# Patient Record
Sex: MALE | Race: WHITE | NOT HISPANIC OR LATINO | Employment: FULL TIME | URBAN - METROPOLITAN AREA
[De-identification: names, ages, dates, MRNs, and addresses within clinical notes are randomized per-mention and may not be internally consistent; named-entity substitution may affect disease eponyms.]

---

## 2017-01-19 ENCOUNTER — TELEPHONE (OUTPATIENT)
Dept: CARDIOLOGY | Facility: CLINIC | Age: 62
End: 2017-01-19

## 2017-01-19 NOTE — TELEPHONE ENCOUNTER
Patient said that he got a call to reschedule his appt.  Said this was moved to may 15th at 11;45  Would like later that day.    [phone]

## 2017-02-28 RX ORDER — LISINOPRIL 20 MG/1
TABLET ORAL
Qty: 30 TABLET | Refills: 5 | Status: SHIPPED | OUTPATIENT
Start: 2017-02-28 | End: 2017-08-27 | Stop reason: SDUPTHER

## 2017-04-21 RX ORDER — ISOSORBIDE MONONITRATE 30 MG/1
TABLET, EXTENDED RELEASE ORAL
Qty: 30 TABLET | Refills: 1 | Status: SHIPPED | OUTPATIENT
Start: 2017-04-21 | End: 2017-06-24 | Stop reason: SDUPTHER

## 2017-04-21 RX ORDER — ATORVASTATIN CALCIUM 40 MG/1
TABLET, FILM COATED ORAL
Qty: 30 TABLET | Refills: 1 | Status: SHIPPED | OUTPATIENT
Start: 2017-04-21 | End: 2017-06-24 | Stop reason: SDUPTHER

## 2017-04-21 RX ORDER — ATENOLOL 50 MG/1
TABLET ORAL
Qty: 45 TABLET | Refills: 1 | Status: SHIPPED | OUTPATIENT
Start: 2017-04-21 | End: 2017-06-24 | Stop reason: SDUPTHER

## 2017-05-18 ENCOUNTER — OFFICE VISIT (OUTPATIENT)
Dept: CARDIOLOGY | Facility: CLINIC | Age: 62
End: 2017-05-18

## 2017-05-18 VITALS
HEIGHT: 72 IN | BODY MASS INDEX: 29.66 KG/M2 | DIASTOLIC BLOOD PRESSURE: 80 MMHG | WEIGHT: 219 LBS | HEART RATE: 64 BPM | SYSTOLIC BLOOD PRESSURE: 128 MMHG

## 2017-05-18 DIAGNOSIS — I25.10 CORONARY ARTERY DISEASE INVOLVING NATIVE CORONARY ARTERY OF NATIVE HEART WITHOUT ANGINA PECTORIS: Primary | ICD-10-CM

## 2017-05-18 DIAGNOSIS — I25.82 CHRONIC TOTAL OCCLUSION OF CORONARY ARTERY: ICD-10-CM

## 2017-05-18 DIAGNOSIS — E78.2 MIXED HYPERLIPIDEMIA: ICD-10-CM

## 2017-05-18 PROBLEM — I10 ESSENTIAL HYPERTENSION: Status: ACTIVE | Noted: 2017-05-18

## 2017-05-18 PROCEDURE — 93000 ELECTROCARDIOGRAM COMPLETE: CPT | Performed by: INTERNAL MEDICINE

## 2017-05-18 PROCEDURE — 99214 OFFICE O/P EST MOD 30 MIN: CPT | Performed by: INTERNAL MEDICINE

## 2017-06-26 RX ORDER — ATENOLOL 50 MG/1
TABLET ORAL
Qty: 45 TABLET | Refills: 5 | Status: SHIPPED | OUTPATIENT
Start: 2017-06-26 | End: 2018-03-18 | Stop reason: SDUPTHER

## 2017-06-26 RX ORDER — ISOSORBIDE MONONITRATE 30 MG/1
TABLET, EXTENDED RELEASE ORAL
Qty: 30 TABLET | Refills: 5 | Status: SHIPPED | OUTPATIENT
Start: 2017-06-26 | End: 2018-03-10 | Stop reason: SDUPTHER

## 2017-06-26 RX ORDER — ATORVASTATIN CALCIUM 40 MG/1
TABLET, FILM COATED ORAL
Qty: 30 TABLET | Refills: 3 | Status: SHIPPED | OUTPATIENT
Start: 2017-06-26 | End: 2017-12-20 | Stop reason: SDUPTHER

## 2017-07-10 ENCOUNTER — LAB (OUTPATIENT)
Dept: LAB | Facility: HOSPITAL | Age: 62
End: 2017-07-10

## 2017-07-10 DIAGNOSIS — E78.2 MIXED HYPERLIPIDEMIA: Primary | ICD-10-CM

## 2017-07-10 DIAGNOSIS — E78.2 MIXED HYPERLIPIDEMIA: ICD-10-CM

## 2017-07-10 LAB
ALBUMIN SERPL-MCNC: 4.3 G/DL (ref 3.5–5.2)
ALBUMIN/GLOB SERPL: 1.2 G/DL
ALP SERPL-CCNC: 71 U/L (ref 39–117)
ALT SERPL W P-5'-P-CCNC: 18 U/L (ref 1–41)
ANION GAP SERPL CALCULATED.3IONS-SCNC: 16.4 MMOL/L
AST SERPL-CCNC: 16 U/L (ref 1–40)
BILIRUB SERPL-MCNC: 0.8 MG/DL (ref 0.1–1.2)
BUN BLD-MCNC: 23 MG/DL (ref 8–23)
BUN/CREAT SERPL: 22.1 (ref 7–25)
CALCIUM SPEC-SCNC: 9.5 MG/DL (ref 8.6–10.5)
CHLORIDE SERPL-SCNC: 103 MMOL/L (ref 98–107)
CHOLEST SERPL-MCNC: 158 MG/DL (ref 0–200)
CO2 SERPL-SCNC: 22.6 MMOL/L (ref 22–29)
CREAT BLD-MCNC: 1.04 MG/DL (ref 0.76–1.27)
GFR SERPL CREATININE-BSD FRML MDRD: 73 ML/MIN/1.73
GLOBULIN UR ELPH-MCNC: 3.6 GM/DL
GLUCOSE BLD-MCNC: 87 MG/DL (ref 65–99)
HDLC SERPL-MCNC: 44 MG/DL (ref 40–60)
LDLC SERPL CALC-MCNC: 96 MG/DL (ref 0–100)
LDLC/HDLC SERPL: 2.19 {RATIO}
POTASSIUM BLD-SCNC: 4.3 MMOL/L (ref 3.5–5.2)
PROT SERPL-MCNC: 7.9 G/DL (ref 6–8.5)
SODIUM BLD-SCNC: 142 MMOL/L (ref 136–145)
TRIGL SERPL-MCNC: 89 MG/DL (ref 0–150)
VLDLC SERPL-MCNC: 17.8 MG/DL (ref 5–40)

## 2017-07-10 PROCEDURE — 36415 COLL VENOUS BLD VENIPUNCTURE: CPT

## 2017-07-10 PROCEDURE — 80061 LIPID PANEL: CPT

## 2017-07-10 PROCEDURE — 80053 COMPREHEN METABOLIC PANEL: CPT

## 2017-07-24 ENCOUNTER — TELEPHONE (OUTPATIENT)
Dept: CARDIOLOGY | Facility: CLINIC | Age: 62
End: 2017-07-24

## 2017-07-24 NOTE — TELEPHONE ENCOUNTER
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 He will just need another letter if so. Please advise.  We can probably cut and paste last years. Also he will pick it up when ready.    Thanks,  Karli

## 2017-07-25 ENCOUNTER — DOCUMENTATION (OUTPATIENT)
Dept: CARDIOLOGY | Facility: CLINIC | Age: 62
End: 2017-07-25

## 2017-07-25 NOTE — PROGRESS NOTES
To Whom It May Concern,     I have reviewed Mr. Pabon's case and have clinically evaluated him.  He has a history of coronary artery disease with stable angina that is mild, CCS class I and unchanged. 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         Dr. Han Nicholas M.D.  [facility]

## 2017-08-28 RX ORDER — LISINOPRIL 20 MG/1
TABLET ORAL
Qty: 30 TABLET | Refills: 5 | Status: SHIPPED | OUTPATIENT
Start: 2017-08-28 | End: 2018-07-08 | Stop reason: SDUPTHER

## 2017-12-20 RX ORDER — ATORVASTATIN CALCIUM 40 MG/1
TABLET, FILM COATED ORAL
Qty: 30 TABLET | Refills: 0 | OUTPATIENT
Start: 2017-12-20

## 2017-12-20 RX ORDER — ATORVASTATIN CALCIUM 40 MG/1
TABLET, FILM COATED ORAL
Qty: 90 TABLET | Refills: 1 | Status: SHIPPED | OUTPATIENT
Start: 2017-12-20 | End: 2018-12-10 | Stop reason: SDUPTHER

## 2018-03-11 RX ORDER — ISOSORBIDE MONONITRATE 30 MG/1
TABLET, EXTENDED RELEASE ORAL
Qty: 90 TABLET | Refills: 0 | Status: SHIPPED | OUTPATIENT
Start: 2018-03-11 | End: 2018-05-24 | Stop reason: SDUPTHER

## 2018-03-19 RX ORDER — ATENOLOL 50 MG/1
TABLET ORAL
Qty: 135 TABLET | Refills: 0 | Status: SHIPPED | OUTPATIENT
Start: 2018-03-19 | End: 2018-05-24 | Stop reason: SDUPTHER

## 2018-05-25 RX ORDER — ISOSORBIDE MONONITRATE 30 MG/1
TABLET, EXTENDED RELEASE ORAL
Qty: 90 TABLET | Refills: 0 | Status: SHIPPED | OUTPATIENT
Start: 2018-05-25 | End: 2018-12-10 | Stop reason: SDUPTHER

## 2018-05-25 RX ORDER — ATENOLOL 50 MG/1
TABLET ORAL
Qty: 135 TABLET | Refills: 0 | Status: SHIPPED | OUTPATIENT
Start: 2018-05-25 | End: 2019-01-03 | Stop reason: SDUPTHER

## 2018-06-28 ENCOUNTER — OFFICE VISIT (OUTPATIENT)
Dept: CARDIOLOGY | Facility: CLINIC | Age: 63
End: 2018-06-28

## 2018-06-28 VITALS
SYSTOLIC BLOOD PRESSURE: 138 MMHG | DIASTOLIC BLOOD PRESSURE: 70 MMHG | WEIGHT: 229 LBS | HEIGHT: 72 IN | HEART RATE: 70 BPM | BODY MASS INDEX: 31.02 KG/M2

## 2018-06-28 DIAGNOSIS — I25.82 CHRONIC TOTAL OCCLUSION OF CORONARY ARTERY: ICD-10-CM

## 2018-06-28 DIAGNOSIS — I25.10 CORONARY ARTERY DISEASE INVOLVING NATIVE CORONARY ARTERY OF NATIVE HEART WITHOUT ANGINA PECTORIS: Primary | ICD-10-CM

## 2018-06-28 DIAGNOSIS — I10 ESSENTIAL HYPERTENSION: ICD-10-CM

## 2018-06-28 DIAGNOSIS — E78.2 MIXED HYPERLIPIDEMIA: ICD-10-CM

## 2018-06-28 PROCEDURE — 93000 ELECTROCARDIOGRAM COMPLETE: CPT | Performed by: INTERNAL MEDICINE

## 2018-06-28 PROCEDURE — 99214 OFFICE O/P EST MOD 30 MIN: CPT | Performed by: INTERNAL MEDICINE

## 2018-07-09 RX ORDER — LISINOPRIL 20 MG/1
TABLET ORAL
Qty: 30 TABLET | Refills: 5 | Status: SHIPPED | OUTPATIENT
Start: 2018-07-09 | End: 2019-02-15 | Stop reason: SDUPTHER

## 2018-07-17 ENCOUNTER — LAB (OUTPATIENT)
Dept: LAB | Facility: HOSPITAL | Age: 63
End: 2018-07-17

## 2018-07-17 DIAGNOSIS — I10 ESSENTIAL HYPERTENSION: ICD-10-CM

## 2018-07-17 DIAGNOSIS — I25.10 CORONARY ARTERY DISEASE INVOLVING NATIVE CORONARY ARTERY OF NATIVE HEART WITHOUT ANGINA PECTORIS: ICD-10-CM

## 2018-07-17 LAB
ALBUMIN SERPL-MCNC: 4.3 G/DL (ref 3.5–5.2)
ALBUMIN/GLOB SERPL: 1.4 G/DL
ALP SERPL-CCNC: 72 U/L (ref 39–117)
ALT SERPL W P-5'-P-CCNC: 17 U/L (ref 1–41)
ANION GAP SERPL CALCULATED.3IONS-SCNC: 13.8 MMOL/L
AST SERPL-CCNC: 15 U/L (ref 1–40)
BILIRUB SERPL-MCNC: 0.6 MG/DL (ref 0.1–1.2)
BUN BLD-MCNC: 16 MG/DL (ref 8–23)
BUN/CREAT SERPL: 16 (ref 7–25)
CALCIUM SPEC-SCNC: 9.5 MG/DL (ref 8.6–10.5)
CHLORIDE SERPL-SCNC: 103 MMOL/L (ref 98–107)
CHOLEST SERPL-MCNC: 140 MG/DL (ref 0–200)
CO2 SERPL-SCNC: 24.2 MMOL/L (ref 22–29)
CREAT BLD-MCNC: 1 MG/DL (ref 0.76–1.27)
GFR SERPL CREATININE-BSD FRML MDRD: 76 ML/MIN/1.73
GLOBULIN UR ELPH-MCNC: 3.1 GM/DL
GLUCOSE BLD-MCNC: 94 MG/DL (ref 65–99)
HDLC SERPL-MCNC: 40 MG/DL (ref 40–60)
LDLC SERPL CALC-MCNC: 81 MG/DL (ref 0–100)
LDLC/HDLC SERPL: 2.03 {RATIO}
POTASSIUM BLD-SCNC: 4.1 MMOL/L (ref 3.5–5.2)
PROT SERPL-MCNC: 7.4 G/DL (ref 6–8.5)
SODIUM BLD-SCNC: 141 MMOL/L (ref 136–145)
TRIGL SERPL-MCNC: 95 MG/DL (ref 0–150)
VLDLC SERPL-MCNC: 19 MG/DL (ref 5–40)

## 2018-07-17 PROCEDURE — 80053 COMPREHEN METABOLIC PANEL: CPT | Performed by: INTERNAL MEDICINE

## 2018-07-17 PROCEDURE — 36415 COLL VENOUS BLD VENIPUNCTURE: CPT | Performed by: INTERNAL MEDICINE

## 2018-07-17 PROCEDURE — 80061 LIPID PANEL: CPT

## 2018-07-18 ENCOUNTER — TELEPHONE (OUTPATIENT)
Dept: CARDIOLOGY | Facility: CLINIC | Age: 63
End: 2018-07-18

## 2018-08-16 ENCOUNTER — TELEPHONE (OUTPATIENT)
Dept: CARDIOLOGY | Facility: CLINIC | Age: 63
End: 2018-08-16

## 2018-08-16 NOTE — TELEPHONE ENCOUNTER
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

## 2018-08-21 DIAGNOSIS — I25.10 CORONARY ARTERY DISEASE INVOLVING NATIVE CORONARY ARTERY OF NATIVE HEART WITHOUT ANGINA PECTORIS: Primary | ICD-10-CM

## 2018-09-04 ENCOUNTER — TELEPHONE (OUTPATIENT)
Dept: CARDIOLOGY | Facility: CLINIC | Age: 63
End: 2018-09-04

## 2018-09-04 ENCOUNTER — HOSPITAL ENCOUNTER (OUTPATIENT)
Dept: CARDIOLOGY | Facility: HOSPITAL | Age: 63
Discharge: HOME | End: 2018-09-04
Attending: INTERNAL MEDICINE | Admitting: INTERNAL MEDICINE

## 2018-09-04 DIAGNOSIS — I25.10 CORONARY ARTERY DISEASE INVOLVING NATIVE CORONARY ARTERY OF NATIVE HEART WITHOUT ANGINA PECTORIS: ICD-10-CM

## 2018-09-04 LAB
BH CV STRESS BP STAGE 1: NORMAL
BH CV STRESS BP STAGE 2: NORMAL
BH CV STRESS DURATION MIN STAGE 1: 3
BH CV STRESS DURATION MIN STAGE 2: 3
BH CV STRESS DURATION SEC STAGE 1: 0
BH CV STRESS DURATION SEC STAGE 2: 0
BH CV STRESS GRADE STAGE 1: 10
BH CV STRESS GRADE STAGE 2: 12
BH CV STRESS HR STAGE 1: 111
BH CV STRESS HR STAGE 2: 158
BH CV STRESS METS STAGE 1: 5
BH CV STRESS METS STAGE 2: 7.5
BH CV STRESS PROTOCOL 1: NORMAL
BH CV STRESS RECOVERY BP: NORMAL MMHG
BH CV STRESS RECOVERY HR: 91 BPM
BH CV STRESS SPEED STAGE 1: 1.7
BH CV STRESS SPEED STAGE 2: 2.5
BH CV STRESS STAGE 1: 1
BH CV STRESS STAGE 2: 2
MAXIMAL PREDICTED HEART RATE: 159 BPM
PERCENT MAX PREDICTED HR: 99.37 %
STRESS BASELINE BP: NORMAL MMHG
STRESS BASELINE HR: 73 BPM
STRESS PERCENT HR: 117 %
STRESS POST ESTIMATED WORKLOAD: 7 METS
STRESS POST EXERCISE DUR MIN: 6 MIN
STRESS POST EXERCISE DUR SEC: 0 SEC
STRESS POST PEAK BP: NORMAL MMHG
STRESS POST PEAK HR: 158 BPM
STRESS TARGET HR: 135 BPM

## 2018-09-04 PROCEDURE — 93016 CV STRESS TEST SUPVJ ONLY: CPT | Performed by: INTERNAL MEDICINE

## 2018-09-04 PROCEDURE — 93017 CV STRESS TEST TRACING ONLY: CPT

## 2018-09-04 PROCEDURE — 93018 CV STRESS TEST I&R ONLY: CPT | Performed by: INTERNAL MEDICINE

## 2018-09-04 NOTE — TELEPHONE ENCOUNTER
Pt called back about his stress test from today. He would like to know if he is clear from a DOT standpoint. If so He would like to know if we can get a letter to him ASAP, as he is planning on moving a tractor on Friday.  Please advise.    Thanks,  Karli

## 2018-09-06 ENCOUNTER — DOCUMENTATION (OUTPATIENT)
Dept: CARDIOLOGY | Facility: CLINIC | Age: 63
End: 2018-09-06

## 2018-09-06 NOTE — PROGRESS NOTES
To Whom It May Concern,    I had the pleasure of following Meng Pabon in our cardiology clinic.  He has a known chronic total occlusion of the mid LAD and mild coronary disease elsewhere.  He has no angina or dyspnea on exertion.  He is able to tolerate a treadmill stress test with no evidence of ischemia.  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       Sincerely,    Han Nicholas M.D.

## 2018-12-11 RX ORDER — ATORVASTATIN CALCIUM 40 MG/1
TABLET, FILM COATED ORAL
Qty: 90 TABLET | Refills: 1 | Status: SHIPPED | OUTPATIENT
Start: 2018-12-11 | End: 2019-09-10 | Stop reason: SDUPTHER

## 2018-12-11 RX ORDER — ISOSORBIDE MONONITRATE 30 MG/1
TABLET, EXTENDED RELEASE ORAL
Qty: 90 TABLET | Refills: 1 | Status: SHIPPED | OUTPATIENT
Start: 2018-12-11 | End: 2020-01-21

## 2019-01-03 RX ORDER — ATENOLOL 50 MG/1
TABLET ORAL
Qty: 135 TABLET | Refills: 2 | Status: SHIPPED | OUTPATIENT
Start: 2019-01-03 | End: 2020-01-21

## 2019-02-18 RX ORDER — LISINOPRIL 20 MG/1
TABLET ORAL
Qty: 30 TABLET | Refills: 5 | Status: SHIPPED | OUTPATIENT
Start: 2019-02-18 | End: 2020-04-14

## 2019-07-05 ENCOUNTER — HOSPITAL ENCOUNTER (OUTPATIENT)
Dept: GENERAL RADIOLOGY | Facility: HOSPITAL | Age: 64
Discharge: HOME | End: 2019-07-05
Admitting: NURSE PRACTITIONER

## 2019-07-05 ENCOUNTER — HOSPITAL ENCOUNTER (OUTPATIENT)
Dept: GENERAL RADIOLOGY | Facility: HOSPITAL | Age: 64
Discharge: HOME | End: 2019-07-05

## 2019-07-05 ENCOUNTER — OFFICE VISIT (OUTPATIENT)
Dept: INTERNAL MEDICINE | Age: 64
End: 2019-07-05

## 2019-07-05 VITALS
WEIGHT: 232.2 LBS | SYSTOLIC BLOOD PRESSURE: 132 MMHG | TEMPERATURE: 97.5 F | HEIGHT: 72 IN | DIASTOLIC BLOOD PRESSURE: 80 MMHG | HEART RATE: 64 BPM | BODY MASS INDEX: 31.45 KG/M2 | OXYGEN SATURATION: 98 %

## 2019-07-05 DIAGNOSIS — K21.9 GASTROESOPHAGEAL REFLUX DISEASE WITHOUT ESOPHAGITIS: ICD-10-CM

## 2019-07-05 DIAGNOSIS — R07.81 PAIN IN RIB: ICD-10-CM

## 2019-07-05 DIAGNOSIS — Z76.89 ENCOUNTER TO ESTABLISH CARE: Primary | ICD-10-CM

## 2019-07-05 PROCEDURE — 71046 X-RAY EXAM CHEST 2 VIEWS: CPT

## 2019-07-05 PROCEDURE — 72072 X-RAY EXAM THORAC SPINE 3VWS: CPT

## 2019-07-05 PROCEDURE — 99203 OFFICE O/P NEW LOW 30 MIN: CPT | Performed by: NURSE PRACTITIONER

## 2019-07-05 NOTE — PROGRESS NOTES
"Meng Pabon / 64 y.o. / male  Encounter Date: 07/05/2019    ASSESSMENT & PLAN:    Problem List Items Addressed This Visit     None      Visit Diagnoses     Encounter to establish care    -  Primary    Pain in rib        Relevant Orders    XR Chest PA & Lateral    XR spine thoracic 3 vw    Gastroesophageal reflux disease without esophagitis            Orders Placed This Encounter   Procedures   • XR Chest PA & Lateral   • XR spine thoracic 3 vw     No orders of the defined types were placed in this encounter.      Summary/Discussion:  1. Xray of thoracic spine and chest to rule out fracture or underlying disease causing rib pain daily x 4 months.   2. Handout provided for acid reflux lifestyle modifications, and recommend OTC Zantac or Pepcid for recurrent symptoms.   3. Patient declined treatment for rib pain, he is managing with Aleve PRN (once daily maybe).   4. Return in approx 3-6 months for rib pain, GERD.     Return in about 6 months (around 1/5/2020), or if symptoms worsen or fail to improve, for Check Up on rib pain, GERD.  ________________________________________________________________    VITALS:    Visit Vitals  /80   Pulse 64   Temp 97.5 °F (36.4 °C)   Ht 182.9 cm (72\")   Wt 105 kg (232 lb 3.2 oz)   SpO2 98%   BMI 31.49 kg/m²       BP Readings from Last 3 Encounters:   07/05/19 132/80   06/28/18 138/70   05/18/17 128/80     Wt Readings from Last 3 Encounters:   07/05/19 105 kg (232 lb 3.2 oz)   06/28/18 104 kg (229 lb)   05/18/17 99.3 kg (219 lb)      Body mass index is 31.49 kg/m².    CC: Main reason(s) for today's visit: Establish Care (pt c/o L sided rib pain, sometimes stabbing pain in R ribs, x 4 months) and Rib Pain    Patient is a 64 y.o. male who is here to establish care with new PCP and for preventive medicine service visit.  He is a new patient to me.      Concerns today include: Pain in L rib x 4-5 months, location: L posterior/thoracic spine involvement, radiates to side and around " to front. Improves with position or mobility, worse with laying down or sitting prolonged time. No trauma or injury to the area known. He takes Aleve as needed for occasional pain. Also complains of R side anterior rib pain that is intermittent, stabbing, approx once per week. No know triggers or relief for R side rib pain. Patient has never been a smoker.  Denies SOA, pain with deep breathing, chest pain, palpitations, headaches, vision changes.     Acid Reflux: onset >2 years ago, patient stopped drinking sodas and decreased spicy/fried foods, moderate improvement in symptoms. Never had EGD or diagnosis of reflux previously. Sx worse in daytime after known triggers (carbonated beverages, fried foods)  Heartburn   He complains of belching, heartburn, nausea and a sore throat. He reports no chest pain, no choking, no coughing or no wheezing. This is a recurrent problem. The current episode started more than 1 year ago. The problem occurs occasionally. The problem has been waxing and waning. The heartburn duration is several minutes. The heartburn is located in the substernum. The heartburn is of moderate intensity. The heartburn does not wake him from sleep. The heartburn does not limit his activity. The heartburn doesn't change with position. The symptoms are aggravated by certain foods. Pertinent negatives include no fatigue, orthopnea or weight loss. Risk factors include ETOH use and obesity. He has tried an antacid and a diet change (improved with decrease soda and fried foods intake) for the symptoms. The treatment provided moderate relief. Past procedures do not include an EGD.     Patient Care Team:  Agustina Cordero APRN as PCP - General (Nurse Practitioner)  Provider, No Known as PCP - Family Medicine  ____________________________________________________________________    REVIEW OF SYSTEMS    Review of Systems   Constitutional: Negative for activity change, appetite change, fatigue, unexpected weight change  and weight loss.   HENT: Positive for sore throat.    Eyes: Negative.    Respiratory: Negative.  Negative for cough, choking, shortness of breath and wheezing.    Cardiovascular: Negative for chest pain.   Gastrointestinal: Positive for heartburn and nausea.   Genitourinary: Negative.    Musculoskeletal: Positive for back pain (thoracic, L rib). Negative for arthralgias and myalgias.   Neurological: Negative.  Negative for headaches.   Psychiatric/Behavioral: Negative.      PHYSICAL EXAMINATION    Physical Exam   Constitutional: He is oriented to person, place, and time. He appears well-developed. No distress.   Neck: Normal range of motion. Neck supple.   Cardiovascular: Normal rate, regular rhythm and normal heart sounds.   Pulmonary/Chest: Effort normal and breath sounds normal. He has no wheezes.   Musculoskeletal: Normal range of motion. He exhibits no edema or tenderness.   Lymphadenopathy:     He has no cervical adenopathy.   Neurological: He is alert and oriented to person, place, and time.   Skin: Skin is warm and dry. No rash noted. No erythema.   Psychiatric: He has a normal mood and affect.   Vitals reviewed.    REVIEWED DATA:    Labs:   Lab Results   Component Value Date     07/17/2018    K 4.1 07/17/2018    AST 15 07/17/2018    ALT 17 07/17/2018    BUN 16 07/17/2018    CREATININE 1.00 07/17/2018    CREATININE 1.04 07/10/2017    CREATININE 1.21 09/14/2016    EGFRIFNONA 76 07/17/2018    EGFRIFAFRI  09/14/2016      Comment:      <15 Indicative of kidney failure.       Lab Results   Component Value Date    GLUCOSE 94 07/17/2018    GLUCOSE 87 07/10/2017    GLUCOSE 100 (H) 09/14/2016       Lab Results   Component Value Date    LDL 81 07/17/2018    LDL 96 07/10/2017     09/14/2016    HDL 40 07/17/2018    TRIG 95 07/17/2018       No results found for: TSH, FREET4       Lab Results   Component Value Date    WBC 8.24 08/14/2015    HGB 15.0 08/14/2015     08/14/2015         Imaging:       Medical Tests:      Summary of old records / correspondence / consultant report:      Request outside records:   ______________________________________________________________________    ALLERGIES  No Known Allergies     MEDICATIONS  Current Outpatient Medications on File Prior to Visit   Medication Sig   • aspirin 325 MG tablet Take 1 tablet by mouth daily.   • atenolol (TENORMIN) 50 MG tablet TAKE 1 AND 1/2 TABLETS BY MOUTH DAILY   • atorvastatin (LIPITOR) 40 MG tablet TAKE 1 TABLET BY MOUTH DAILY   • isosorbide mononitrate (IMDUR) 30 MG 24 hr tablet TAKE 1 TABLET BY MOUTH DAILY   • lisinopril (PRINIVIL,ZESTRIL) 20 MG tablet TAKE 1 TABLET BY MOUTH DAILY     No current facility-administered medications on file prior to visit.        PFSH:     The following portions of the patient's history were reviewed and updated as appropriate: Allergies / Current Medications / Past Medical History / Surgical History / Social History / Family History    PROBLEM LIST   Patient Active Problem List   Diagnosis   • Coronary artery disease involving native coronary artery of native heart without angina pectoris   • Chronic total occlusion of coronary artery   • Mixed hyperlipidemia   • Essential hypertension       PAST MEDICAL HISTORY  Past Medical History:   Diagnosis Date   • Atypical chest pain    • Basal cell carcinoma    • CAD (coronary artery disease)    • Chronic total occlusion of coronary artery    • GERD (gastroesophageal reflux disease)    • Hyperlipidemia    • Hypertension        SURGICAL HISTORY  Past Surgical History:   Procedure Laterality Date   • CARDIAC CATHETERIZATION     • INNER EAR SURGERY         SOCIAL HISTORY  Social History     Socioeconomic History   • Marital status:      Spouse name: Not on file   • Number of children: Not on file   • Years of education: Not on file   • Highest education level: Not on file   Tobacco Use   • Smoking status: Never Smoker   • Smokeless tobacco: Never Used    Substance and Sexual Activity   • Alcohol use: Yes     Comment: occasional, more in summer   • Sexual activity: Yes     Partners: Female     Birth control/protection: Post-menopausal       FAMILY HISTORY  Family History   Problem Relation Age of Onset   • Hypertension Father    • Skin cancer Father    • Heart disease Father    • Heart disease Sister    • No Known Problems Mother

## 2019-07-05 NOTE — PATIENT INSTRUCTIONS
Food Choices for Gastroesophageal Reflux Disease, Adult  When you have gastroesophageal reflux disease (GERD), the foods you eat and your eating habits are very important. Choosing the right foods can help ease your discomfort. Think about working with a nutrition specialist (dietitian) to help you make good choices.  What are tips for following this plan?  Meals  · Choose healthy foods that are low in fat, such as fruits, vegetables, whole grains, low-fat dairy products, and lean meat, fish, and poultry.  · Eat small meals often instead of 3 large meals a day. Eat your meals slowly, and in a place where you are relaxed. Avoid bending over or lying down until 2-3 hours after eating.  · Avoid eating meals 2-3 hours before bed.  · Avoid drinking a lot of liquid with meals.  · Cook foods using methods other than frying. Bake, grill, or broil food instead.  · Avoid or limit:  ? Chocolate.  ? Peppermint or spearmint.  ? Alcohol.  ? Pepper.  ? Black and decaffeinated coffee.  ? Black and decaffeinated tea.  ? Bubbly (carbonated) soft drinks.  ? Caffeinated energy drinks and soft drinks.  · Limit high-fat foods such as:  ? Fatty meat or fried foods.  ? Whole milk, cream, butter, or ice cream.  ? Nuts and nut butters.  ? Pastries, donuts, and sweets made with butter or shortening.  · Avoid foods that cause symptoms. These foods may be different for everyone. Common foods that cause symptoms include:  ? Tomatoes.  ? Oranges, trang, and limes.  ? Peppers.  ? Spicy food.  ? Onions and garlic.  ? Vinegar.  Lifestyle    · Maintain a healthy weight. Ask your doctor what weight is healthy for you. If you need to lose weight, work with your doctor to do so safely.  · Exercise for at least 30 minutes for 5 or more days each week, or as told by your doctor.  · Wear loose-fitting clothes.  · Do not smoke. If you need help quitting, ask your doctor.  · Sleep with the head of your bed higher than your feet. Use a wedge under the  mattress or blocks under the bed frame to raise the head of the bed.  Summary  · When you have gastroesophageal reflux disease (GERD), food and lifestyle choices are very important in easing your symptoms.  · Eat small meals often instead of 3 large meals a day. Eat your meals slowly, and in a place where you are relaxed.  · Limit high-fat foods such as fatty meat or fried foods.  · Avoid bending over or lying down until 2-3 hours after eating.  · Avoid peppermint and spearmint, caffeine, alcohol, and chocolate.  This information is not intended to replace advice given to you by your health care provider. Make sure you discuss any questions you have with your health care provider.  Document Released: 06/18/2013 Document Revised: 01/23/2018 Document Reviewed: 01/23/2018  [employer] Interactive Patient Education © 2019 [employer]

## 2019-07-26 ENCOUNTER — TELEPHONE (OUTPATIENT)
Dept: CARDIOLOGY | Facility: CLINIC | Age: 64
End: 2019-07-26

## 2019-07-26 ENCOUNTER — DOCUMENTATION (OUTPATIENT)
Dept: CARDIOLOGY | Facility: CLINIC | Age: 64
End: 2019-07-26

## 2019-08-28 ENCOUNTER — OFFICE VISIT (OUTPATIENT)
Dept: CARDIOLOGY | Facility: CLINIC | Age: 64
End: 2019-08-28

## 2019-08-28 VITALS
SYSTOLIC BLOOD PRESSURE: 138 MMHG | DIASTOLIC BLOOD PRESSURE: 70 MMHG | HEART RATE: 64 BPM | BODY MASS INDEX: 31.18 KG/M2 | HEIGHT: 72 IN | WEIGHT: 230.2 LBS

## 2019-08-28 DIAGNOSIS — E78.2 MIXED HYPERLIPIDEMIA: ICD-10-CM

## 2019-08-28 DIAGNOSIS — I10 ESSENTIAL HYPERTENSION: ICD-10-CM

## 2019-08-28 DIAGNOSIS — I25.10 CORONARY ARTERY DISEASE INVOLVING NATIVE CORONARY ARTERY OF NATIVE HEART WITHOUT ANGINA PECTORIS: Primary | ICD-10-CM

## 2019-08-28 PROCEDURE — 93000 ELECTROCARDIOGRAM COMPLETE: CPT | Performed by: NURSE PRACTITIONER

## 2019-08-28 PROCEDURE — 99214 OFFICE O/P EST MOD 30 MIN: CPT | Performed by: NURSE PRACTITIONER

## 2019-08-28 NOTE — PROGRESS NOTES
Date of Office Visit: 2019  Encounter Provider: SARIKA Cm  Place of Service: [facility] CARDIOLOGY  Patient Name: Meng Pabon  :1955      Chief Complaint   Patient presents with   • Coronary Artery Disease   • Hypertension   • Follow-up   :     Dear SARIKA Mercado,     HPI: Meng Pabon is a pleasant 64 y.o. male who presents today for cardiac follow up. He is a new patient to me and his previous records have been reviewed.     He has a known history of GERD, hypertension, and hyperlipidemia.  He has a known history of coronary artery disease with total occlusion of the mid LAD with right to left collaterals.  He has been treated with isosorbide.  He has had a history of chronic dyspnea with exertion and inclines.  He followed up with Dr. Pradip Nicholas in 2018 and was doing well at that time.    He presents today for his annual cardiac follow-up.  He said overall he is feeling much better.  His dizziness and shortness of breath have resolved.  He only now experiences dyspnea when climbing one hill in his neighborhood.  He occasionally experiences some indigestion after meals, but denies any exertional chest pressure or pain.  He further denies PND, orthopnea, cough, edema, palpitations, dizziness, syncope, or bleeding.    Past Medical History:   Diagnosis Date   • Atypical chest pain    • Basal cell carcinoma    • CAD (coronary artery disease)    • Chronic total occlusion of coronary artery    • GERD (gastroesophageal reflux disease)    • Hyperlipidemia    • Hypertension        Past Surgical History:   Procedure Laterality Date   • CARDIAC CATHETERIZATION     • INNER EAR SURGERY         Social History     Socioeconomic History   • Marital status:      Spouse name: Not on file   • Number of children: Not on file   • Years of education: Not on file   • Highest education level: Not on file   Tobacco Use   • Smoking status: Never Smoker   •  Smokeless tobacco: Never Used   Substance and Sexual Activity   • Alcohol use: Yes     Comment: Caffeine use   • Sexual activity: Yes     Partners: Female     Birth control/protection: Post-menopausal       Family History   Problem Relation Age of Onset   • Hypertension Father    • Skin cancer Father    • Heart disease Father    • Heart disease Sister    • No Known Problems Mother        The following portion of the patient's history were reviewed and updated as appropriate: past medical history, past surgical history, past social history, past family history, allergies, current medications, and problem list.    Review of Systems   Constitution: Positive for weight loss. Negative for chills, diaphoresis, fever, malaise/fatigue, night sweats and weight gain.   HENT: Positive for hearing loss. Negative for nosebleeds, sore throat and tinnitus.    Eyes: Negative for blurred vision, double vision, pain and visual disturbance.   Cardiovascular: Negative for chest pain, claudication, cyanosis, dyspnea on exertion, irregular heartbeat, leg swelling, near-syncope, orthopnea, palpitations, paroxysmal nocturnal dyspnea and syncope.   Respiratory: Negative for cough, hemoptysis, shortness of breath, snoring and wheezing.    Endocrine: Negative for cold intolerance, heat intolerance and polyuria.   Hematologic/Lymphatic: Negative for bleeding problem. Does not bruise/bleed easily.   Skin: Negative for color change, dry skin, flushing and itching.   Musculoskeletal: Negative for falls, joint pain, joint swelling, muscle cramps, muscle weakness and myalgias.   Gastrointestinal: Negative for abdominal pain, constipation, heartburn, melena, nausea and vomiting.   Genitourinary: Negative for dysuria and hematuria.        Erectile dysfunction   Neurological: Negative for excessive daytime sleepiness, dizziness, light-headedness, loss of balance, numbness, paresthesias, seizures and vertigo.   Psychiatric/Behavioral: Negative for  "altered mental status, depression, memory loss and substance abuse. The patient does not have insomnia and is not nervous/anxious.    Allergic/Immunologic: Negative for environmental allergies.       No Known Allergies      Current Outpatient Medications:   •  aspirin 325 MG tablet, Take 1 tablet by mouth daily., Disp: , Rfl:   •  atenolol (TENORMIN) 50 MG tablet, TAKE 1 AND 1/2 TABLETS BY MOUTH DAILY, Disp: 135 tablet, Rfl: 2  •  atorvastatin (LIPITOR) 40 MG tablet, TAKE 1 TABLET BY MOUTH DAILY, Disp: 90 tablet, Rfl: 1  •  isosorbide mononitrate (IMDUR) 30 MG 24 hr tablet, TAKE 1 TABLET BY MOUTH DAILY, Disp: 90 tablet, Rfl: 1  •  lisinopril (PRINIVIL,ZESTRIL) 20 MG tablet, TAKE 1 TABLET BY MOUTH DAILY, Disp: 30 tablet, Rfl: 5        Objective:     Vitals:    08/28/19 1423   BP: 138/70   BP Location: Left arm   Pulse: 64   Weight: 104 kg (230 lb 3.2 oz)   Height: 182.9 cm (72\")     Body mass index is 31.22 kg/m².    PHYSICAL EXAM:    Vitals Reviewed.   General Appearance: No acute distress, well developed and well nourished. Obese.   Eyes: Conjunctiva and lids: No erythema, swelling, or discharge. Sclera non-icteric.   HENT: Atraumatic, normocephalic. External eyes, ears, and nose normal. No hearing loss noted. Mucous membranes normal. Lips not cyanotic. Neck supple with no tenderness.  Respiratory: No signs of respiratory distress. Respiration rhythm and depth normal.   Clear to auscultation. No rales, crackles, rhonchi, or wheezing auscultated.   Cardiovascular:  Jugular Venous Pressure: Normal  Heart Rate and Rhythm: Normal, Heart Sounds: Normal S1 and S2. No S3 or S4 noted.  Murmurs: No murmurs noted. No rubs, thrills, or gallops.   Lower Extremities: No edema noted.  Gastrointestinal:  Abdomen soft, non-distended, non-tender. Normal bowel sounds. No hepatomegaly.   Musculoskeletal: Normal movement of extremities  Skin and Nails: General appearance normal. No pallor, cyanosis, diaphoresis. Skin temperature " normal. No clubbing of fingernails.   Psychiatric: Patient alert and oriented to person, place, and time. Speech and behavior appropriate. Normal mood and affect.       ECG 12 Lead  Date/Time: 8/28/2019 2:20 PM  Performed by: Reyna Lee APRN  Authorized by: Reyna Lee APRN   Comparison: compared with previous ECG from 6/28/2018  Similar to previous ECG  Rhythm: sinus rhythm  Rate: normal  BPM: 64  Conduction: conduction normal  ST Segments: ST segments normal  T Waves: T waves normal  QRS axis: normal    Clinical impression: normal ECG              Assessment:       Diagnosis Plan   1. Coronary artery disease involving native coronary artery of native heart without angina pectoris     2. Essential hypertension     3. Mixed hyperlipidemia            Plan:       1.  Coronary Artery Disease: Documented chronic total occlusion of the mid LAD with class II anginal symptoms in the past.  I think overall he is doing very well on his current medication regimen.  If he has any symptoms of concern, of asked him to call the office.  I have ordered a CBC, CMP, and lipid panel for his annual blood work.    Coronary Artery Disease  Assessment  • The patient has CCS class II - angina only during vigorous physical activity  • The patient has stable angina     Plan  • Lifestyle modifications discussed include adhering to a heart healthy diet, maintenance of a healthy weight, medication compliance, regular exercise and regular monitoring of cholesterol and blood pressure    Subjective - Objective  • Current antiplatelet therapy includes aspirin 325 mg      2.  Hypertension: Blood pressure well controlled.    3.  Hyperlipidemia: I have ordered lipid panel, AST, and ALT.  Continue atorvastatin.    4.  I recommend follow-up with Dr. Pradip Nicholas in 1 year, unless otherwise needed sooner.    As always, it has been a pleasure to participate in your patient's care. Thank you.       Sincerely,         Reyna Lee  SARIKA        **Arnoldo Disclaimer:**  Much of this encounter note is an electronic transcription/translation of spoken language to printed text. The electronic translation of spoken language may permit erroneous, or at times, nonsensical words or phrases to be inadvertently transcribed. Although I have reviewed the note for such errors, some may still exist.

## 2019-09-10 ENCOUNTER — TELEPHONE (OUTPATIENT)
Dept: CARDIOLOGY | Facility: CLINIC | Age: 64
End: 2019-09-10

## 2019-09-14 RX ORDER — ATORVASTATIN CALCIUM 40 MG/1
TABLET, FILM COATED ORAL
Qty: 90 TABLET | Refills: 0 | Status: SHIPPED | OUTPATIENT
Start: 2019-09-14 | End: 2020-09-10

## 2019-10-10 ENCOUNTER — LAB (OUTPATIENT)
Dept: LAB | Facility: HOSPITAL | Age: 64
End: 2019-10-10

## 2019-10-10 DIAGNOSIS — I25.10 CORONARY ARTERY DISEASE INVOLVING NATIVE CORONARY ARTERY OF NATIVE HEART WITHOUT ANGINA PECTORIS: ICD-10-CM

## 2019-10-10 DIAGNOSIS — I10 ESSENTIAL HYPERTENSION: ICD-10-CM

## 2019-10-10 LAB
ALBUMIN SERPL-MCNC: 4.3 G/DL (ref 3.5–5.2)
ALBUMIN/GLOB SERPL: 1.4 G/DL
ALP SERPL-CCNC: 70 U/L (ref 39–117)
ALT SERPL W P-5'-P-CCNC: 22 U/L (ref 1–41)
ANION GAP SERPL CALCULATED.3IONS-SCNC: 15.2 MMOL/L (ref 5–15)
AST SERPL-CCNC: 18 U/L (ref 1–40)
BASOPHILS # BLD AUTO: 0.04 10*3/MM3 (ref 0–0.2)
BASOPHILS NFR BLD AUTO: 0.7 % (ref 0–1.5)
BILIRUB SERPL-MCNC: 0.7 MG/DL (ref 0.2–1.2)
BUN BLD-MCNC: 17 MG/DL (ref 8–23)
BUN/CREAT SERPL: 16.8 (ref 7–25)
CALCIUM SPEC-SCNC: 9.1 MG/DL (ref 8.6–10.5)
CHLORIDE SERPL-SCNC: 101 MMOL/L (ref 98–107)
CHOLEST SERPL-MCNC: 155 MG/DL (ref 0–200)
CO2 SERPL-SCNC: 23.8 MMOL/L (ref 22–29)
CREAT BLD-MCNC: 1.01 MG/DL (ref 0.76–1.27)
DEPRECATED RDW RBC AUTO: 41.2 FL (ref 37–54)
EOSINOPHIL # BLD AUTO: 0.14 10*3/MM3 (ref 0–0.4)
EOSINOPHIL NFR BLD AUTO: 2.3 % (ref 0.3–6.2)
ERYTHROCYTE [DISTWIDTH] IN BLOOD BY AUTOMATED COUNT: 13.1 % (ref 12.3–15.4)
GFR SERPL CREATININE-BSD FRML MDRD: 75 ML/MIN/1.73
GLOBULIN UR ELPH-MCNC: 3 GM/DL
GLUCOSE BLD-MCNC: 93 MG/DL (ref 65–99)
HCT VFR BLD AUTO: 45 % (ref 37.5–51)
HDLC SERPL-MCNC: 37 MG/DL (ref 40–60)
HGB BLD-MCNC: 15.1 G/DL (ref 13–17.7)
IMM GRANULOCYTES # BLD AUTO: 0.02 10*3/MM3 (ref 0–0.05)
IMM GRANULOCYTES NFR BLD AUTO: 0.3 % (ref 0–0.5)
LDLC SERPL CALC-MCNC: 95 MG/DL (ref 0–100)
LDLC/HDLC SERPL: 2.58 {RATIO}
LYMPHOCYTES # BLD AUTO: 2.75 10*3/MM3 (ref 0.7–3.1)
LYMPHOCYTES NFR BLD AUTO: 45.4 % (ref 19.6–45.3)
MCH RBC QN AUTO: 28.7 PG (ref 26.6–33)
MCHC RBC AUTO-ENTMCNC: 33.6 G/DL (ref 31.5–35.7)
MCV RBC AUTO: 85.6 FL (ref 79–97)
MONOCYTES # BLD AUTO: 0.57 10*3/MM3 (ref 0.1–0.9)
MONOCYTES NFR BLD AUTO: 9.4 % (ref 5–12)
NEUTROPHILS # BLD AUTO: 2.54 10*3/MM3 (ref 1.7–7)
NEUTROPHILS NFR BLD AUTO: 41.9 % (ref 42.7–76)
NRBC BLD AUTO-RTO: 0 /100 WBC (ref 0–0.2)
PLATELET # BLD AUTO: 259 10*3/MM3 (ref 140–450)
PMV BLD AUTO: 9.7 FL (ref 6–12)
POTASSIUM BLD-SCNC: 4.5 MMOL/L (ref 3.5–5.2)
PROT SERPL-MCNC: 7.3 G/DL (ref 6–8.5)
RBC # BLD AUTO: 5.26 10*6/MM3 (ref 4.14–5.8)
SODIUM BLD-SCNC: 140 MMOL/L (ref 136–145)
TRIGL SERPL-MCNC: 113 MG/DL (ref 0–150)
VLDLC SERPL-MCNC: 22.6 MG/DL (ref 5–40)
WBC NRBC COR # BLD: 6.06 10*3/MM3 (ref 3.4–10.8)

## 2019-10-10 PROCEDURE — 80061 LIPID PANEL: CPT

## 2019-10-10 PROCEDURE — 80053 COMPREHEN METABOLIC PANEL: CPT

## 2019-10-10 PROCEDURE — 85025 COMPLETE CBC W/AUTO DIFF WBC: CPT

## 2019-10-10 PROCEDURE — 36415 COLL VENOUS BLD VENIPUNCTURE: CPT

## 2020-01-21 RX ORDER — ATENOLOL 50 MG/1
TABLET ORAL
Qty: 135 TABLET | Refills: 2 | Status: SHIPPED | OUTPATIENT
Start: 2020-01-21 | End: 2021-04-26

## 2020-01-21 RX ORDER — ISOSORBIDE MONONITRATE 30 MG/1
TABLET, EXTENDED RELEASE ORAL
Qty: 90 TABLET | Refills: 1 | Status: SHIPPED | OUTPATIENT
Start: 2020-01-21 | End: 2020-12-07

## 2020-04-14 RX ORDER — LISINOPRIL 20 MG/1
TABLET ORAL
Qty: 30 TABLET | Refills: 5 | Status: SHIPPED | OUTPATIENT
Start: 2020-04-14 | End: 2020-09-10

## 2020-07-02 ENCOUNTER — OFFICE VISIT (OUTPATIENT)
Dept: CARDIOLOGY | Facility: CLINIC | Age: 65
End: 2020-07-02

## 2020-07-02 VITALS
DIASTOLIC BLOOD PRESSURE: 80 MMHG | HEART RATE: 60 BPM | WEIGHT: 229 LBS | HEIGHT: 72 IN | BODY MASS INDEX: 31.02 KG/M2 | SYSTOLIC BLOOD PRESSURE: 148 MMHG

## 2020-07-02 DIAGNOSIS — I10 ESSENTIAL HYPERTENSION: Primary | ICD-10-CM

## 2020-07-02 DIAGNOSIS — I25.10 CORONARY ARTERY DISEASE INVOLVING NATIVE CORONARY ARTERY OF NATIVE HEART WITHOUT ANGINA PECTORIS: ICD-10-CM

## 2020-07-02 DIAGNOSIS — I25.82 CHRONIC TOTAL OCCLUSION OF CORONARY ARTERY: ICD-10-CM

## 2020-07-02 DIAGNOSIS — E78.2 MIXED HYPERLIPIDEMIA: ICD-10-CM

## 2020-07-02 PROCEDURE — 93000 ELECTROCARDIOGRAM COMPLETE: CPT | Performed by: INTERNAL MEDICINE

## 2020-07-02 PROCEDURE — 99214 OFFICE O/P EST MOD 30 MIN: CPT | Performed by: INTERNAL MEDICINE

## 2020-07-02 NOTE — PROGRESS NOTES
Date of Office Visit: 20  Encounter Provider: Han Nicholas MD  Place of Service: [facility] CARDIOLOGY  Patient Name: Meng Pabon  :1955      Chief Complaint   Patient presents with   • Follow-up     1 year   • Coronary Artery Disease       HPI: Meng Pabon is a pleasant 65 y.o. male who presents today for cardiac follow up. He has a known history of GERD, hypertension, and hyperlipidemia.  He has a known history of coronary artery disease with total occlusion of the mid LAD with right to left collaterals.  He has been treated with isosorbide.  He has had a history of chronic dyspnea with exertion and inclines.    He presents today for his annual cardiac follow-up.  Since our last visit he has been doing well.  He denies any chest pain or dyspnea.  He is tolerating his current medical regimen without any difficulty.  He states his blood pressures at home is typically well controlled with the systolic readings in the 120-130 mmHg range.             Past Medical History:   Diagnosis Date   • Atypical chest pain    • Basal cell carcinoma    • CAD (coronary artery disease)    • Chronic total occlusion of coronary artery    • GERD (gastroesophageal reflux disease)    • Hyperlipidemia    • Hypertension        Past Surgical History:   Procedure Laterality Date   • CARDIAC CATHETERIZATION     • INNER EAR SURGERY         Social History     Socioeconomic History   • Marital status:      Spouse name: Not on file   • Number of children: Not on file   • Years of education: Not on file   • Highest education level: Not on file   Tobacco Use   • Smoking status: Never Smoker   • Smokeless tobacco: Never Used   Substance and Sexual Activity   • Alcohol use: Yes     Comment: Caffeine use   • Sexual activity: Yes     Partners: Female     Birth control/protection: Post-menopausal       Family History   Problem Relation Age of Onset   • Hypertension Father    • Skin  cancer Father    • Heart disease Father    • Heart disease Sister    • No Known Problems Mother        The following portion of the patient's history were reviewed and updated as appropriate: past medical history, past surgical history, past social history, past family history, allergies, current medications, and problem list.    Review of Systems   Constitution: Positive for weight loss. Negative for chills, diaphoresis, fever, malaise/fatigue, night sweats and weight gain.   HENT: Positive for hearing loss. Negative for nosebleeds, sore throat and tinnitus.    Eyes: Negative for blurred vision, double vision, pain and visual disturbance.   Cardiovascular: Negative for chest pain, claudication, cyanosis, dyspnea on exertion, irregular heartbeat, leg swelling, near-syncope, orthopnea, palpitations, paroxysmal nocturnal dyspnea and syncope.   Respiratory: Negative for cough, hemoptysis, shortness of breath, snoring and wheezing.    Endocrine: Negative for cold intolerance, heat intolerance and polyuria.   Hematologic/Lymphatic: Negative for bleeding problem. Does not bruise/bleed easily.   Skin: Negative for color change, dry skin, flushing and itching.   Musculoskeletal: Negative for falls, joint pain, joint swelling, muscle cramps, muscle weakness and myalgias.   Gastrointestinal: Negative for abdominal pain, constipation, heartburn, melena, nausea and vomiting.   Genitourinary: Negative for dysuria and hematuria.        Erectile dysfunction   Neurological: Negative for excessive daytime sleepiness, dizziness, light-headedness, loss of balance, numbness, paresthesias, seizures and vertigo.   Psychiatric/Behavioral: Negative for altered mental status, depression, memory loss and substance abuse. The patient does not have insomnia and is not nervous/anxious.    Allergic/Immunologic: Negative for environmental allergies.       No Known Allergies      Current Outpatient Medications:   •  aspirin 325 MG tablet, Take 1  "tablet by mouth daily., Disp: , Rfl:   •  atenolol (TENORMIN) 50 MG tablet, TAKE 1 AND 1/2 TABLETS BY MOUTH DAILY, Disp: 135 tablet, Rfl: 2  •  atorvastatin (LIPITOR) 40 MG tablet, TAKE 1 TABLET BY MOUTH DAILY, Disp: 90 tablet, Rfl: 0  •  isosorbide mononitrate (IMDUR) 30 MG 24 hr tablet, TAKE 1 TABLET BY MOUTH DAILY, Disp: 90 tablet, Rfl: 1  •  lisinopril (PRINIVIL,ZESTRIL) 20 MG tablet, TAKE 1 TABLET BY MOUTH DAILY, Disp: 30 tablet, Rfl: 5        Objective:     Vitals:    07/02/20 1508   BP: 148/80   Pulse: 60   Weight: 104 kg (229 lb)   Height: 182.9 cm (72\")     Body mass index is 31.06 kg/m².    PHYSICAL EXAM:    Vitals Reviewed.   General Appearance: No acute distress, well developed and well nourished. Obese.   Eyes: Conjunctiva and lids: No erythema, swelling, or discharge. Sclera non-icteric.   HENT: Atraumatic, normocephalic. External eyes, ears, and nose normal. No hearing loss noted. Mucous membranes normal. Lips not cyanotic. Neck supple with no tenderness.  Respiratory: No signs of respiratory distress. Respiration rhythm and depth normal.   Clear to auscultation. No rales, crackles, rhonchi, or wheezing auscultated.   Cardiovascular:  Jugular Venous Pressure: Normal  Heart Rate and Rhythm: Normal, Heart Sounds: Normal S1 and S2. No S3 or S4 noted.  Murmurs: No murmurs noted. No rubs, thrills, or gallops.   Lower Extremities: No edema noted.  Gastrointestinal:  Abdomen soft, non-distended, non-tender. Normal bowel sounds. No hepatomegaly.   Musculoskeletal: Normal movement of extremities  Skin and Nails: General appearance normal. No pallor, cyanosis, diaphoresis. Skin temperature normal. No clubbing of fingernails.   Psychiatric: Patient alert and oriented to person, place, and time. Speech and behavior appropriate. Normal mood and affect.       ECG 12 Lead  Date/Time: 7/2/2020 4:08 PM  Performed by: Han Nicholas MD  Authorized by: Han Nicholas MD   Comparison: compared with " previous ECG from 8/28/2019  Similar to previous ECG  Rhythm: sinus rhythm  Rate: normal  QRS axis: normal    Clinical impression: normal ECG              Assessment:      No diagnosis found.       Plan:     1.  Coronary Artery Disease: Documented chronic total occlusion of the mid LAD with class II anginal symptoms in the past.  He has been doing well as of late and has no angina.  -Continue aspirin and atorvastatin therapy.  -Continue low-dose Imdur.  -I will continue to follow him clinically.    2.  Hypertension: Blood pressure mildly elevated today.  Well-controlled at home.  He will call me if his systolic is staying above 140 mmHg and we could consider at that point in time increasing his therapy.    3.  Hyperlipidemia: Continue atorvastatin at current dose    I will see him back in 6 mo

## 2020-07-15 ENCOUNTER — TELEPHONE (OUTPATIENT)
Dept: CARDIOLOGY | Facility: CLINIC | Age: 65
End: 2020-07-15

## 2020-07-15 NOTE — TELEPHONE ENCOUNTER
----- Message from Meng Pabon sent at 7/14/2020  5:17 PM EDT -----  Regarding: Visit Follow-Up Question  Contact: [phone]  Just checking to see if letter for my [employer] medical card is ready?

## 2020-07-15 NOTE — TELEPHONE ENCOUNTER
See below. 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 He saw us last on 7/2/20. And his last stress was 9/4/18.  Please advise.    Thanks,  Karli

## 2020-09-10 RX ORDER — LISINOPRIL 20 MG/1
TABLET ORAL
Qty: 90 TABLET | Refills: 2 | Status: SHIPPED | OUTPATIENT
Start: 2020-09-10 | End: 2020-10-12

## 2020-09-10 RX ORDER — ATORVASTATIN CALCIUM 40 MG/1
TABLET, FILM COATED ORAL
Qty: 90 TABLET | Refills: 2 | Status: SHIPPED | OUTPATIENT
Start: 2020-09-10 | End: 2021-06-10

## 2020-10-12 RX ORDER — LISINOPRIL 20 MG/1
TABLET ORAL
Qty: 30 TABLET | Refills: 5 | Status: SHIPPED | OUTPATIENT
Start: 2020-10-12 | End: 2021-06-01

## 2020-12-08 RX ORDER — ISOSORBIDE MONONITRATE 30 MG/1
TABLET, EXTENDED RELEASE ORAL
Qty: 90 TABLET | Refills: 4 | Status: SHIPPED | OUTPATIENT
Start: 2020-12-08 | End: 2022-03-01

## 2021-03-22 ENCOUNTER — BULK ORDERING (OUTPATIENT)
Dept: CASE MANAGEMENT | Facility: OTHER | Age: 66
End: 2021-03-22

## 2021-03-22 DIAGNOSIS — Z23 IMMUNIZATION DUE: ICD-10-CM

## 2021-03-26 ENCOUNTER — IMMUNIZATION (OUTPATIENT)
Dept: VACCINE CLINIC | Facility: HOSPITAL | Age: 66
End: 2021-03-26

## 2021-03-26 DIAGNOSIS — Z23 IMMUNIZATION DUE: ICD-10-CM

## 2021-03-26 PROCEDURE — 91300 HC SARSCOV02 VAC 30MCG/0.3ML IM: CPT | Performed by: INTERNAL MEDICINE

## 2021-03-26 PROCEDURE — 0001A: CPT | Performed by: INTERNAL MEDICINE

## 2021-04-13 ENCOUNTER — HOSPITAL ENCOUNTER (OUTPATIENT)
Dept: GENERAL RADIOLOGY | Facility: HOSPITAL | Age: 66
Discharge: HOME | End: 2021-04-13
Admitting: NURSE PRACTITIONER

## 2021-04-13 ENCOUNTER — OFFICE VISIT (OUTPATIENT)
Dept: INTERNAL MEDICINE | Age: 66
End: 2021-04-13

## 2021-04-13 VITALS
OXYGEN SATURATION: 98 % | DIASTOLIC BLOOD PRESSURE: 80 MMHG | HEIGHT: 72 IN | HEART RATE: 60 BPM | SYSTOLIC BLOOD PRESSURE: 128 MMHG | WEIGHT: 235.4 LBS | TEMPERATURE: 97.1 F | BODY MASS INDEX: 31.89 KG/M2

## 2021-04-13 DIAGNOSIS — I10 ESSENTIAL HYPERTENSION: ICD-10-CM

## 2021-04-13 DIAGNOSIS — Z11.59 NEED FOR HEPATITIS C SCREENING TEST: ICD-10-CM

## 2021-04-13 DIAGNOSIS — Z76.89 ENCOUNTER TO ESTABLISH CARE WITH NEW DOCTOR: Primary | ICD-10-CM

## 2021-04-13 DIAGNOSIS — R05.8 PRODUCTIVE COUGH: ICD-10-CM

## 2021-04-13 DIAGNOSIS — I25.10 CORONARY ARTERY DISEASE INVOLVING NATIVE CORONARY ARTERY OF NATIVE HEART WITHOUT ANGINA PECTORIS: ICD-10-CM

## 2021-04-13 DIAGNOSIS — R07.81 RIB PAIN ON LEFT SIDE: ICD-10-CM

## 2021-04-13 DIAGNOSIS — E78.2 MIXED HYPERLIPIDEMIA: ICD-10-CM

## 2021-04-13 PROCEDURE — 99214 OFFICE O/P EST MOD 30 MIN: CPT | Performed by: NURSE PRACTITIONER

## 2021-04-13 PROCEDURE — 71046 X-RAY EXAM CHEST 2 VIEWS: CPT

## 2021-04-13 RX ORDER — CYCLOBENZAPRINE HCL 5 MG
5 TABLET ORAL 3 TIMES DAILY PRN
Qty: 21 TABLET | Refills: 0 | Status: ON HOLD | OUTPATIENT
Start: 2021-04-13 | End: 2021-08-03

## 2021-04-13 NOTE — PROGRESS NOTES
"    I N T E R N A L  M E D I C I N E  ION MCMANUS, APRN      ENCOUNTER DATE:  04/13/2021    Meng Pabon / 65 y.o. / male      CHIEF COMPLAINT / REASON FOR OFFICE VISIT     Establish Care      ASSESSMENT & PLAN     1. Encounter to establish care with new doctor    2. 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 Productive cough  - CBC & Differential  - XR Chest PA & Lateral    4. Essential hypertension  - Continue lisinopril 20mg daily, imdur 30mg dailym and atenolol BID per cardiology   - Comprehensive Metabolic Panel    5. Mixed hyperlipidemia  - Continue atorvastatin 40mg nightly   - Comprehensive Metabolic Panel  - Lipid Panel With / Chol / HDL Ratio    6. Coronary artery disease involving native coronary artery of native heart without angina pectoris  - Continue management with cardiology   - aspirin 325mg daily     7. Need for hepatitis C screening test  - Hepatitis C Antibody    Orders Placed This Encounter   Procedures   • XR Chest PA & Lateral   • Comprehensive Metabolic Panel   • Lipid Panel With / Chol / HDL Ratio   • Hepatitis C Antibody   • CBC & Differential     New Medications Ordered This Visit   Medications   • cyclobenzaprine (FLEXERIL) 5 MG tablet     Sig: Take 1 tablet by mouth 3 (Three) Times a Day As Needed for Muscle Spasms.     Dispense:  21 tablet     Refill:  0       SUMMARY/DISCUSSION  • Follow-up as needed for today's left rib/pain if symptoms worsen or do not improve.   • Follow-up in one year for annual physical.     Next Appointment with me: Visit date not found    Return in about 1 year (around 4/13/2022) for Annual physical.      VITAL SIGNS     Visit Vitals  /80   Pulse 60   Temp 97.1 °F (36.2 °C) (Temporal)   Ht 182.9 cm (72\")   Wt 107 kg (235 lb 6.4 oz)   SpO2 98%   BMI 31.93 kg/m²       Wt Readings from Last 3 Encounters:   04/13/21 " 107 kg (235 lb 6.4 oz)   07/02/20 104 kg (229 lb)   08/28/19 104 kg (230 lb 3.2 oz)     Body mass index is 31.93 kg/m².      MEDICATIONS AT THE TIME OF OFFICE VISIT     Current Outpatient Medications on File Prior to Visit   Medication Sig   • aspirin 325 MG tablet Take 1 tablet by mouth daily.   • atenolol (TENORMIN) 50 MG tablet TAKE 1 AND 1/2 TABLETS BY MOUTH DAILY   • atorvastatin (LIPITOR) 40 MG tablet TAKE 1 TABLET BY MOUTH DAILY   • isosorbide mononitrate (IMDUR) 30 MG 24 hr tablet TAKE 1 TABLET BY MOUTH DAILY   • lisinopril (PRINIVIL,ZESTRIL) 20 MG tablet TAKE 1 TABLET BY MOUTH DAILY     No current facility-administered medications on file prior to visit.         HISTORY OF PRESENT ILLNESS     Patient presents to establish care with new provider in office as well as complaint of left rib/flank pain.     Pain in left rib area/flank has been for approx 4 months. He had similar pain in 2019 in which he had thoracic spine and chest xray ordered which were both unremarkable. Pain improved, but has came back recently. L rib and posterior thoracic radiates to side and around to front. Improves with position or mobility, worse with laying down or sitting prolonged time. No trauma or injury to the area known. He takes Aleve as needed for occasional pain. Patient has never been a smoker.  Denies SOA, pain with deep breathing, chest pain, palpitations, headaches, vision changes. Does admit to productive cough over the last month. 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    Cardiology: Followed by Dr. Nicholas. Cardiac cath in the past with medication treatment today with aspirin 325mg, imdur 30mg daily, atenolol 50mg BID, and atorvastatin 40mg nightly. Hyperlipidemia well controlled, along with HTN on lisinopril 20mg daily.     Additional history of GERD, take nexium as needed with relief. Basal cell carcinoma. Inner ear surgery when younger, decreased hearing in right ear.     Heart disease  history in sister and father.      Never a smoker, no vaping, no substance abuse and approx two beers nightly.     REVIEW OF SYSTEMS     Constitutional neg except per HPI   Resp neg  CV neg  Musc dull ache in in left rib/flank worse with movement     PHYSICAL EXAMINATION     Physical Exam  Constitutional  No distress  Cardiovascular Rate  normal . Rhythm: regular . Heart sounds:  normal  Pulmonary/Chest  Effort normal. Breath sounds:  normal  Musc neg tenderness, decreased range of motion thoracic spine and left rib/flank   Psychiatric  Alert. Judgment and thought content normal. Mood normal     REVIEWED DATA     Labs:         Imaging:           Medical Tests:             Summary of old records / correspondence / consultant report:           Request outside records:           *Examiner was wearing medical surgical mask, face shield and exam gloves during the entire duration of the visit. Patient was masked the entire time.   Minimum social distance of 6 ft maintained entire visit except if physical contact was necessary as documented.     **Dragon Disclaimer:   Much of this encounter note is an electronic transcription/translation of spoken language to printed text. The electronic translation of spoken language may permit erroneous, or at times, nonsensical words or phrases to be inadvertently transcribed. Although I have reviewed the note for such errors, some may still exist.

## 2021-04-14 DIAGNOSIS — J84.9 LUNG INTERSTITIAL DISEASE (HCC): Primary | ICD-10-CM

## 2021-04-14 LAB
ALBUMIN SERPL-MCNC: 4.3 G/DL (ref 3.5–5.2)
ALBUMIN/GLOB SERPL: 1.6 G/DL
ALP SERPL-CCNC: 79 U/L (ref 39–117)
ALT SERPL-CCNC: 18 U/L (ref 1–41)
AST SERPL-CCNC: 19 U/L (ref 1–40)
BASOPHILS # BLD AUTO: 0.02 10*3/MM3 (ref 0–0.2)
BASOPHILS NFR BLD AUTO: 0.3 % (ref 0–1.5)
BILIRUB SERPL-MCNC: 0.6 MG/DL (ref 0–1.2)
BUN SERPL-MCNC: 23 MG/DL (ref 8–23)
BUN/CREAT SERPL: 21.7 (ref 7–25)
CALCIUM SERPL-MCNC: 9.2 MG/DL (ref 8.6–10.5)
CHLORIDE SERPL-SCNC: 104 MMOL/L (ref 98–107)
CHOLEST SERPL-MCNC: 139 MG/DL (ref 0–200)
CHOLEST/HDLC SERPL: 3.76 {RATIO}
CO2 SERPL-SCNC: 23.8 MMOL/L (ref 22–29)
CREAT SERPL-MCNC: 1.06 MG/DL (ref 0.76–1.27)
EOSINOPHIL # BLD AUTO: 0.18 10*3/MM3 (ref 0–0.4)
EOSINOPHIL NFR BLD AUTO: 3.1 % (ref 0.3–6.2)
ERYTHROCYTE [DISTWIDTH] IN BLOOD BY AUTOMATED COUNT: 12.9 % (ref 12.3–15.4)
GLOBULIN SER CALC-MCNC: 2.7 GM/DL
GLUCOSE SERPL-MCNC: 94 MG/DL (ref 65–99)
HCT VFR BLD AUTO: 44.4 % (ref 37.5–51)
HCV AB S/CO SERPL IA: <0.1 S/CO RATIO (ref 0–0.9)
HDLC SERPL-MCNC: 37 MG/DL (ref 40–60)
HGB BLD-MCNC: 14.8 G/DL (ref 13–17.7)
IMM GRANULOCYTES # BLD AUTO: 0.02 10*3/MM3 (ref 0–0.05)
IMM GRANULOCYTES NFR BLD AUTO: 0.3 % (ref 0–0.5)
LDLC SERPL CALC-MCNC: 82 MG/DL (ref 0–100)
LYMPHOCYTES # BLD AUTO: 2.11 10*3/MM3 (ref 0.7–3.1)
LYMPHOCYTES NFR BLD AUTO: 36.6 % (ref 19.6–45.3)
MCH RBC QN AUTO: 28.2 PG (ref 26.6–33)
MCHC RBC AUTO-ENTMCNC: 33.3 G/DL (ref 31.5–35.7)
MCV RBC AUTO: 84.7 FL (ref 79–97)
MONOCYTES # BLD AUTO: 0.61 10*3/MM3 (ref 0.1–0.9)
MONOCYTES NFR BLD AUTO: 10.6 % (ref 5–12)
NEUTROPHILS # BLD AUTO: 2.82 10*3/MM3 (ref 1.7–7)
NEUTROPHILS NFR BLD AUTO: 49.1 % (ref 42.7–76)
NRBC BLD AUTO-RTO: 0 /100 WBC (ref 0–0.2)
PLATELET # BLD AUTO: 282 10*3/MM3 (ref 140–450)
POTASSIUM SERPL-SCNC: 4.9 MMOL/L (ref 3.5–5.2)
PROT SERPL-MCNC: 7 G/DL (ref 6–8.5)
RBC # BLD AUTO: 5.24 10*6/MM3 (ref 4.14–5.8)
SODIUM SERPL-SCNC: 137 MMOL/L (ref 136–145)
TRIGL SERPL-MCNC: 106 MG/DL (ref 0–150)
VLDLC SERPL CALC-MCNC: 20 MG/DL (ref 5–40)
WBC # BLD AUTO: 5.76 10*3/MM3 (ref 3.4–10.8)

## 2021-04-16 ENCOUNTER — IMMUNIZATION (OUTPATIENT)
Dept: VACCINE CLINIC | Facility: HOSPITAL | Age: 66
End: 2021-04-16

## 2021-04-16 PROCEDURE — 0002A: CPT | Performed by: INTERNAL MEDICINE

## 2021-04-16 PROCEDURE — 91300 HC SARSCOV02 VAC 30MCG/0.3ML IM: CPT | Performed by: INTERNAL MEDICINE

## 2021-04-26 RX ORDER — ATENOLOL 50 MG/1
TABLET ORAL
Qty: 135 TABLET | Refills: 2 | Status: SHIPPED | OUTPATIENT
Start: 2021-04-26 | End: 2022-06-02

## 2021-04-27 ENCOUNTER — HOSPITAL ENCOUNTER (OUTPATIENT)
Dept: CT IMAGING | Facility: HOSPITAL | Age: 66
Discharge: HOME | End: 2021-04-27
Admitting: NURSE PRACTITIONER

## 2021-04-27 DIAGNOSIS — J84.9 LUNG INTERSTITIAL DISEASE (HCC): ICD-10-CM

## 2021-04-27 PROCEDURE — 71250 CT THORAX DX C-: CPT

## 2021-04-29 DIAGNOSIS — R59.0 HILAR ADENOPATHY: ICD-10-CM

## 2021-04-29 DIAGNOSIS — R91.8 LUNG NODULES: Primary | ICD-10-CM

## 2021-05-05 ENCOUNTER — OFFICE VISIT (OUTPATIENT)
Dept: OTHER | Facility: HOSPITAL | Age: 66
End: 2021-05-05

## 2021-05-05 VITALS
WEIGHT: 233.1 LBS | SYSTOLIC BLOOD PRESSURE: 150 MMHG | OXYGEN SATURATION: 95 % | BODY MASS INDEX: 31.57 KG/M2 | HEIGHT: 72 IN | DIASTOLIC BLOOD PRESSURE: 85 MMHG | TEMPERATURE: 97 F | HEART RATE: 63 BPM | RESPIRATION RATE: 18 BRPM

## 2021-05-05 DIAGNOSIS — R91.8 LUNG NODULES: ICD-10-CM

## 2021-05-05 DIAGNOSIS — R59.0 MEDIASTINAL LYMPHADENOPATHY: Primary | ICD-10-CM

## 2021-05-05 PROCEDURE — 99204 OFFICE O/P NEW MOD 45 MIN: CPT | Performed by: THORACIC SURGERY (CARDIOTHORACIC VASCULAR SURGERY)

## 2021-06-01 RX ORDER — LISINOPRIL 20 MG/1
TABLET ORAL
Qty: 30 TABLET | Refills: 5 | Status: SHIPPED | OUTPATIENT
Start: 2021-06-01 | End: 2022-02-08

## 2021-06-03 NOTE — PROGRESS NOTES
"Chief Complaint  Lung Nodule    Subjective          Meng Pabon presents to [facility] in consultation for newly diagnosed lung nodule.  History of Present Illness  Mr. Pabon is a very pleasant 65-year-old gentleman who was undergoing work-up for complaints of flank pain and was found to have a lung nodule as well as mediastinal lymphadenopathy.  Other than the flank pain, he has no complaints.  He denies any significant shortness of breath or dyspnea on exertion.  He is able to walk greater than a block and up a flight of stairs without difficulty.  Patient has a history of basal cell carcinoma of the skin.  He is a never smoker.  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  He has a family history of cancer in his father with a basal cell carcinoma the skin, sister with breast cancer and a brother with pancreatic cancer.  Objective   Vital Signs:   /85   Pulse 63   Temp 97 °F (36.1 °C) (Oral)   Resp 18   Ht 182.9 cm (72.01\")   Wt 106 kg (233 lb 1.6 oz)   SpO2 95% Comment: room air  BMI 31.61 kg/m²     Physical Exam  Vitals and nursing note reviewed.   Constitutional:       Appearance: He is well-developed.   HENT:      Head: Normocephalic and atraumatic.      Nose: Nose normal.   Eyes:      Conjunctiva/sclera: Conjunctivae normal.   Cardiovascular:      Rate and Rhythm: Normal rate and regular rhythm.   Pulmonary:      Effort: Pulmonary effort is normal.      Breath sounds: Normal breath sounds.   Musculoskeletal:         General: Normal range of motion.      Cervical back: Normal range of motion and neck supple.   Skin:     General: Skin is warm and dry.   Neurological:      Mental Status: He is alert and oriented to person, place, and time.   Psychiatric:         Behavior: Behavior normal.         Thought Content: Thought content normal.         Judgment: Judgment normal.        Result Review :       Data reviewed: Radiologic studies :     Have " independently reviewed the CT of the chest performed on 4/27/2021 which demonstrates sub-6 mm pulmonary nodules within the right upper, middle and lower lobes.  There is mild hilar mediastinal lymphadenopathy.  There is no pleural pericardial effusion.  There is a calcified granuloma in the right upper lobe.     Assessment and Plan      Mr. Pabon is a pleasant 65-year-old gentleman with mild mediastinal lymphadenopathy and small lung nodules.  This will need continued surveillance with a CT of the chest in 3 months to document either stability or growth.  If the nodules or the mediastinal lymph nodes grow, will consider biopsy versus PET CT scan.  This was discussed at length with patient and his family today and I will plan to see him in 3 months with a CT of the chest.  Diagnoses and all orders for this visit:    1. Mediastinal lymphadenopathy (Primary)    2. Lung nodules      I spent 46 minutes caring for Meng on this date of service. This time includes time spent by me in the following activities:preparing for the visit, reviewing tests, obtaining and/or reviewing a separately obtained history, performing a medically appropriate examination and/or evaluation , counseling and educating the patient/family/caregiver and independently interpreting results and communicating that information with the patient/family/caregiver  Follow Up   No follow-ups on file.  Patient was given instructions and counseling regarding his condition or for health maintenance advice. Please see specific information pulled into the AVS if appropriate.

## 2021-06-10 RX ORDER — ATORVASTATIN CALCIUM 40 MG/1
TABLET, FILM COATED ORAL
Qty: 90 TABLET | Refills: 2 | Status: SHIPPED | OUTPATIENT
Start: 2021-06-10 | End: 2022-03-01

## 2021-06-28 DIAGNOSIS — R59.0 MEDIASTINAL LYMPHADENOPATHY: Primary | ICD-10-CM

## 2021-07-13 ENCOUNTER — HOSPITAL ENCOUNTER (OUTPATIENT)
Dept: CARDIOLOGY | Facility: HOSPITAL | Age: 66
Discharge: HOME | End: 2021-07-13
Admitting: NURSE PRACTITIONER

## 2021-07-13 ENCOUNTER — OFFICE VISIT (OUTPATIENT)
Dept: CARDIOLOGY | Facility: CLINIC | Age: 66
End: 2021-07-13

## 2021-07-13 VITALS
DIASTOLIC BLOOD PRESSURE: 82 MMHG | WEIGHT: 228 LBS | HEART RATE: 57 BPM | BODY MASS INDEX: 30.88 KG/M2 | SYSTOLIC BLOOD PRESSURE: 138 MMHG | HEIGHT: 72 IN

## 2021-07-13 DIAGNOSIS — E78.2 MIXED HYPERLIPIDEMIA: ICD-10-CM

## 2021-07-13 DIAGNOSIS — I25.10 CORONARY ARTERY DISEASE INVOLVING NATIVE CORONARY ARTERY OF NATIVE HEART WITHOUT ANGINA PECTORIS: Primary | ICD-10-CM

## 2021-07-13 DIAGNOSIS — I25.10 CORONARY ARTERY DISEASE INVOLVING NATIVE CORONARY ARTERY OF NATIVE HEART WITHOUT ANGINA PECTORIS: ICD-10-CM

## 2021-07-13 DIAGNOSIS — I49.3 FREQUENT PVCS: ICD-10-CM

## 2021-07-13 DIAGNOSIS — I10 ESSENTIAL HYPERTENSION: ICD-10-CM

## 2021-07-13 LAB
BH CV STRESS BP STAGE 1: NORMAL
BH CV STRESS BP STAGE 2: NORMAL
BH CV STRESS DURATION MIN STAGE 1: 3
BH CV STRESS DURATION MIN STAGE 2: 3
BH CV STRESS DURATION SEC STAGE 1: 0
BH CV STRESS DURATION SEC STAGE 2: 0
BH CV STRESS GRADE STAGE 1: 10
BH CV STRESS GRADE STAGE 2: 12
BH CV STRESS HR STAGE 1: 120
BH CV STRESS HR STAGE 2: 158
BH CV STRESS METS STAGE 1: 5
BH CV STRESS METS STAGE 2: 7.5
BH CV STRESS PROTOCOL 1: NORMAL
BH CV STRESS RECOVERY BP: NORMAL MMHG
BH CV STRESS RECOVERY HR: 98 BPM
BH CV STRESS SPEED STAGE 1: 1.7
BH CV STRESS SPEED STAGE 2: 2.5
BH CV STRESS STAGE 1: 1
BH CV STRESS STAGE 2: 2
MAXIMAL PREDICTED HEART RATE: 156 BPM
PERCENT MAX PREDICTED HR: 101.28 %
STRESS BASELINE BP: NORMAL MMHG
STRESS BASELINE HR: 65 BPM
STRESS PERCENT HR: 119 %
STRESS POST ESTIMATED WORKLOAD: 7.5 METS
STRESS POST EXERCISE DUR MIN: 6 MIN
STRESS POST EXERCISE DUR SEC: 0 SEC
STRESS POST PEAK BP: NORMAL MMHG
STRESS POST PEAK HR: 158 BPM
STRESS TARGET HR: 133 BPM

## 2021-07-13 PROCEDURE — 93000 ELECTROCARDIOGRAM COMPLETE: CPT | Performed by: NURSE PRACTITIONER

## 2021-07-13 PROCEDURE — 93018 CV STRESS TEST I&R ONLY: CPT | Performed by: INTERNAL MEDICINE

## 2021-07-13 PROCEDURE — 93016 CV STRESS TEST SUPVJ ONLY: CPT | Performed by: INTERNAL MEDICINE

## 2021-07-13 PROCEDURE — 93017 CV STRESS TEST TRACING ONLY: CPT

## 2021-07-13 PROCEDURE — 99214 OFFICE O/P EST MOD 30 MIN: CPT | Performed by: NURSE PRACTITIONER

## 2021-07-13 RX ORDER — ASPIRIN 81 MG/1
81 TABLET ORAL DAILY
Start: 2021-07-13

## 2021-07-13 NOTE — PROGRESS NOTES
Date of Office Visit: 2021  Encounter Provider: SARIKA Hampton  Place of Service: [facility] CARDIOLOGY  Patient Name: Meng Pabon  :1955    Chief Complaint   Patient presents with   • Coronary Artery Disease   :     HPI: Meng Pabon is a 66 y.o. male who presents today for follow-up.  Old records have been obtained and reviewed by me.  He is a patient of Dr. Nicholas's with a past cardiac history significant for hypertension, hyperlipidemia, and coronary artery disease.  He has a known  of the mid LAD with right to left collaterals for which he has been treated with isosorbide.   He was last seen in the office by Dr. Nicholas in 2020 at which time he is doing well with no complaints of angina or heart failure.  No changes were made to his medical regimen, and he was advised to follow-up in 6 months.   He has been feeling well.  He denies any chest pain, shortness of breath, palpitations, edema, dizziness, or syncope.  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  Outside of his job, he does not really exercise.      Past Medical History:   Diagnosis Date   • Atypical chest pain    • Basal cell carcinoma     15 years prior from    • CAD (coronary artery disease)    • Chronic total occlusion of coronary artery    • GERD (gastroesophageal reflux disease)    • Hyperlipidemia    • Hypertension    • Lung nodule        Past Surgical History:   Procedure Laterality Date   • CARDIAC CATHETERIZATION     • INNER EAR SURGERY         Social History     Socioeconomic History   • Marital status:      Spouse name: Not on file   • Number of children: Not on file   • Years of education: Not on file   • Highest education level: Not on file   Tobacco Use   • Smoking status: Never Smoker   • Smokeless tobacco: Never Used   Vaping Use   • Vaping Use: Never used   Substance and Sexual Activity   • Alcohol use: Yes     Alcohol/week: 2.0 standard  "drinks     Types: 2 Cans of beer per week     Comment: nightly, caffeine - tea   • Drug use: Never   • Sexual activity: Yes     Partners: Female     Birth control/protection: Post-menopausal       Family History   Problem Relation Age of Onset   • Hypertension Father    • Skin cancer Father    • Heart disease Father    • Heart disease Sister    • No Known Problems Mother        Review of Systems   Constitutional: Negative.   Cardiovascular: Negative.  Negative for chest pain, dyspnea on exertion, leg swelling, orthopnea, paroxysmal nocturnal dyspnea and syncope.   Respiratory: Negative.    Hematologic/Lymphatic: Negative for bleeding problem.   Musculoskeletal: Negative for falls.   Gastrointestinal: Negative for melena.   Neurological: Negative for dizziness and light-headedness.       No Known Allergies      Current Outpatient Medications:   •  atenolol (TENORMIN) 50 MG tablet, TAKE 1 AND 1/2 TABLETS BY MOUTH DAILY, Disp: 135 tablet, Rfl: 2  •  atorvastatin (LIPITOR) 40 MG tablet, TAKE 1 TABLET BY MOUTH DAILY, Disp: 90 tablet, Rfl: 2  •  cyclobenzaprine (FLEXERIL) 5 MG tablet, Take 1 tablet by mouth 3 (Three) Times a Day As Needed for Muscle Spasms., Disp: 21 tablet, Rfl: 0  •  isosorbide mononitrate (IMDUR) 30 MG 24 hr tablet, TAKE 1 TABLET BY MOUTH DAILY, Disp: 90 tablet, Rfl: 4  •  lisinopril (PRINIVIL,ZESTRIL) 20 MG tablet, TAKE 1 TABLET BY MOUTH DAILY, Disp: 30 tablet, Rfl: 5  •  aspirin (aspirin) 81 MG EC tablet, Take 1 tablet by mouth Daily., Disp: , Rfl:       Objective:     Vitals:    07/13/21 1320   BP: 138/82   Pulse: 57   Weight: 103 kg (228 lb)   Height: 182.9 cm (72\")     Body mass index is 30.92 kg/m².    PHYSICAL EXAM:    Neck:      Vascular: No JVD.   Pulmonary:      Effort: Pulmonary effort is normal.      Breath sounds: Normal breath sounds.   Cardiovascular:      Normal rate. Regular rhythm.      Murmurs: There is no murmur.      No gallop. No click. No rub.   Pulses:     Intact distal pulses. "           ECG 12 Lead    Date/Time: 7/13/2021 1:53 PM  Performed by: Agustina Olvera APRN  Authorized by: Agustina Olvera APRN   Comparison: compared with previous ECG from 7/2/2020  Similar to previous ECG  Rhythm: sinus rhythm  Rate: normal  BPM: 59    Clinical impression: normal ECG  Comments: Indication: CAD              Assessment:       Diagnosis Plan   1. Coronary artery disease involving native coronary artery of native heart without angina pectoris  aspirin (aspirin) 81 MG EC tablet    Treadmill Stress Test    ECG 12 Lead    Adult Transthoracic Echo Complete W/ Cont if Necessary Per Protocol    Stress Test With Myocardial Perfusion One Day   2. Essential hypertension     3. Mixed hyperlipidemia     4. Frequent PVCs  Adult Transthoracic Echo Complete W/ Cont if Necessary Per Protocol    Stress Test With Myocardial Perfusion One Day     Orders Placed This Encounter   Procedures   • Treadmill Stress Test     Standing Status:   Future     Number of Occurrences:   1     Standing Expiration Date:   7/13/2022     Order Specific Question:   Reason for exam?     Answer:   Known Coronary Artery Disease     Order Specific Question:   Release to patient     Answer:   Immediate   • Stress Test With Myocardial Perfusion One Day     Standing Status:   Future     Standing Expiration Date:   7/13/2022     Order Specific Question:   What stress agent will be used?     Answer:   Exercise with possible pharmacologic     Order Specific Question:   Reason for exam?     Answer:   Known Coronary Artery Disease   • ECG 12 Lead     This order was created via procedure documentation     Order Specific Question:   Release to patient     Answer:   Immediate   • Adult Transthoracic Echo Complete W/ Cont if Necessary Per Protocol     Standing Status:   Future     Standing Expiration Date:   7/13/2022     Order Specific Question:   Reason for exam?     Answer:   Arrhythmia     Order Specific Question:   Arrhythmias  specification?     Answer:   Frequent PVCs          Plan:       1.  Coronary artery disease.  He denies any symptoms of angina.  He has been taking 320 mg of aspirin.  I told him to take 81 mg instead.  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  During exercise, he experienced frequent ectopy/PVCs.  I reviewed the tracings with Dr. Limon.  We are recommending proceeding with an echocardiogram and nuclear stress test for further evaluation before we can clear him.      2.  Hypertension.  His blood pressure is stable.  Continue current regimen with atenolol and lisinopril.      3.  Hyperlipidemia.  Lipid panel from April of this year revealed an LDL of 82 and an HDL of 37.  He is going to really have been on his diet and exercise.  If this does not improve his LDL control, we will need to make an adjustment to his medications.  For now, continue atorvastatin 40 mg daily.      Further recommendations will be made pending the results of the echocardiogram and nuclear stress test.      As always, it has been a pleasure to participate in your patient's care.      Sincerely,         SARIKA Milian

## 2021-07-20 ENCOUNTER — HOSPITAL ENCOUNTER (OUTPATIENT)
Dept: CT IMAGING | Facility: HOSPITAL | Age: 66
Discharge: HOME | End: 2021-07-20
Admitting: THORACIC SURGERY (CARDIOTHORACIC VASCULAR SURGERY)

## 2021-07-20 DIAGNOSIS — R59.0 MEDIASTINAL LYMPHADENOPATHY: ICD-10-CM

## 2021-07-20 PROCEDURE — 71250 CT THORAX DX C-: CPT

## 2021-07-26 ENCOUNTER — HOSPITAL ENCOUNTER (OUTPATIENT)
Dept: CARDIOLOGY | Facility: HOSPITAL | Age: 66
Discharge: HOME | End: 2021-07-26

## 2021-07-26 VITALS
SYSTOLIC BLOOD PRESSURE: 138 MMHG | BODY MASS INDEX: 30.88 KG/M2 | WEIGHT: 228 LBS | HEIGHT: 72 IN | DIASTOLIC BLOOD PRESSURE: 82 MMHG

## 2021-07-26 VITALS — HEIGHT: 72 IN | WEIGHT: 227.07 LBS | BODY MASS INDEX: 30.76 KG/M2

## 2021-07-26 DIAGNOSIS — I25.10 CORONARY ARTERY DISEASE INVOLVING NATIVE CORONARY ARTERY OF NATIVE HEART WITHOUT ANGINA PECTORIS: ICD-10-CM

## 2021-07-26 DIAGNOSIS — I49.3 FREQUENT PVCS: ICD-10-CM

## 2021-07-26 LAB
BH CV NUCLEAR PRIOR STUDY: 3
BH CV REST NUCLEAR ISOTOPE DOSE: 10.5 MCI
BH CV STRESS BP STAGE 1: NORMAL
BH CV STRESS BP STAGE 2: NORMAL
BH CV STRESS DURATION MIN STAGE 1: 3
BH CV STRESS DURATION MIN STAGE 2: 3
BH CV STRESS DURATION SEC STAGE 1: 0
BH CV STRESS DURATION SEC STAGE 2: 0
BH CV STRESS GRADE STAGE 1: 10
BH CV STRESS GRADE STAGE 2: 12
BH CV STRESS HR STAGE 1: 133
BH CV STRESS HR STAGE 2: 156
BH CV STRESS METS STAGE 1: 5
BH CV STRESS METS STAGE 2: 7.5
BH CV STRESS NUCLEAR ISOTOPE DOSE: 34.5 MCI
BH CV STRESS PROTOCOL 1: NORMAL
BH CV STRESS RECOVERY BP: NORMAL MMHG
BH CV STRESS RECOVERY HR: 87 BPM
BH CV STRESS SPEED STAGE 1: 1.7
BH CV STRESS SPEED STAGE 2: 2.5
BH CV STRESS STAGE 1: 1
BH CV STRESS STAGE 2: 2
LV EF NUC BP: 61 %
MAXIMAL PREDICTED HEART RATE: 156 BPM
PERCENT MAX PREDICTED HR: 100 %
STRESS BASELINE BP: NORMAL MMHG
STRESS BASELINE HR: 79 BPM
STRESS PERCENT HR: 118 %
STRESS POST ESTIMATED WORKLOAD: 7.5 METS
STRESS POST EXERCISE DUR MIN: 6 MIN
STRESS POST EXERCISE DUR SEC: 0 SEC
STRESS POST PEAK BP: NORMAL MMHG
STRESS POST PEAK HR: 156 BPM
STRESS TARGET HR: 133 BPM

## 2021-07-26 PROCEDURE — 0 TECHNETIUM TETROFOSMIN KIT: Performed by: NURSE PRACTITIONER

## 2021-07-26 PROCEDURE — 93306 TTE W/DOPPLER COMPLETE: CPT | Performed by: INTERNAL MEDICINE

## 2021-07-26 PROCEDURE — 93306 TTE W/DOPPLER COMPLETE: CPT

## 2021-07-26 PROCEDURE — A9502 TC99M TETROFOSMIN: HCPCS | Performed by: NURSE PRACTITIONER

## 2021-07-26 PROCEDURE — 78452 HT MUSCLE IMAGE SPECT MULT: CPT | Performed by: INTERNAL MEDICINE

## 2021-07-26 PROCEDURE — 93017 CV STRESS TEST TRACING ONLY: CPT

## 2021-07-26 PROCEDURE — 93018 CV STRESS TEST I&R ONLY: CPT | Performed by: INTERNAL MEDICINE

## 2021-07-26 PROCEDURE — 78452 HT MUSCLE IMAGE SPECT MULT: CPT

## 2021-07-26 PROCEDURE — 93016 CV STRESS TEST SUPVJ ONLY: CPT | Performed by: INTERNAL MEDICINE

## 2021-07-26 RX ADMIN — TETROFOSMIN 1 DOSE: 1.38 INJECTION, POWDER, LYOPHILIZED, FOR SOLUTION INTRAVENOUS at 14:10

## 2021-07-26 RX ADMIN — TETROFOSMIN 1 DOSE: 1.38 INJECTION, POWDER, LYOPHILIZED, FOR SOLUTION INTRAVENOUS at 13:16

## 2021-07-27 ENCOUNTER — OFFICE VISIT (OUTPATIENT)
Dept: OTHER | Facility: HOSPITAL | Age: 66
End: 2021-07-27

## 2021-07-27 ENCOUNTER — TELEPHONE (OUTPATIENT)
Dept: CARDIOLOGY | Facility: CLINIC | Age: 66
End: 2021-07-27

## 2021-07-27 VITALS
OXYGEN SATURATION: 96 % | HEART RATE: 53 BPM | TEMPERATURE: 98 F | SYSTOLIC BLOOD PRESSURE: 126 MMHG | WEIGHT: 223.1 LBS | BODY MASS INDEX: 30.25 KG/M2 | DIASTOLIC BLOOD PRESSURE: 72 MMHG

## 2021-07-27 DIAGNOSIS — R91.8 LUNG NODULES: ICD-10-CM

## 2021-07-27 DIAGNOSIS — R59.0 MEDIASTINAL LYMPHADENOPATHY: Primary | ICD-10-CM

## 2021-07-27 DIAGNOSIS — I25.10 CORONARY ARTERY DISEASE INVOLVING NATIVE CORONARY ARTERY OF NATIVE HEART WITHOUT ANGINA PECTORIS: Primary | ICD-10-CM

## 2021-07-27 LAB
AORTIC ARCH: 2.7 CM
ASCENDING AORTA: 2.9 CM
BH CV ECHO MEAS - ACS: 2 CM
BH CV ECHO MEAS - AO MAX PG (FULL): 3.2 MMHG
BH CV ECHO MEAS - AO MAX PG: 8.3 MMHG
BH CV ECHO MEAS - AO MEAN PG (FULL): 2.4 MMHG
BH CV ECHO MEAS - AO MEAN PG: 4.2 MMHG
BH CV ECHO MEAS - AO ROOT AREA (BSA CORRECTED): 1.4
BH CV ECHO MEAS - AO ROOT AREA: 8 CM^2
BH CV ECHO MEAS - AO ROOT DIAM: 3.2 CM
BH CV ECHO MEAS - AO V2 MAX: 144.5 CM/SEC
BH CV ECHO MEAS - AO V2 MEAN: 92.9 CM/SEC
BH CV ECHO MEAS - AO V2 VTI: 33.4 CM
BH CV ECHO MEAS - ASC AORTA: 2.9 CM
BH CV ECHO MEAS - AVA(I,A): 2 CM^2
BH CV ECHO MEAS - AVA(I,D): 2 CM^2
BH CV ECHO MEAS - AVA(V,A): 2.4 CM^2
BH CV ECHO MEAS - AVA(V,D): 2.4 CM^2
BH CV ECHO MEAS - BSA(HAYCOCK): 2.3 M^2
BH CV ECHO MEAS - BSA: 2.3 M^2
BH CV ECHO MEAS - BZI_BMI: 30.9 KILOGRAMS/M^2
BH CV ECHO MEAS - BZI_METRIC_HEIGHT: 182.9 CM
BH CV ECHO MEAS - BZI_METRIC_WEIGHT: 103.4 KG
BH CV ECHO MEAS - EDV(MOD-SP2): 78 ML
BH CV ECHO MEAS - EDV(MOD-SP4): 84 ML
BH CV ECHO MEAS - EDV(TEICH): 106.9 ML
BH CV ECHO MEAS - EF(CUBED): 83 %
BH CV ECHO MEAS - EF(MOD-BP): 66.3 %
BH CV ECHO MEAS - EF(MOD-SP2): 65.4 %
BH CV ECHO MEAS - EF(MOD-SP4): 66.7 %
BH CV ECHO MEAS - EF(TEICH): 75.8 %
BH CV ECHO MEAS - ESV(MOD-SP2): 27 ML
BH CV ECHO MEAS - ESV(MOD-SP4): 28 ML
BH CV ECHO MEAS - ESV(TEICH): 25.9 ML
BH CV ECHO MEAS - FS: 44.6 %
BH CV ECHO MEAS - IVS/LVPW: 0.94
BH CV ECHO MEAS - IVSD: 1 CM
BH CV ECHO MEAS - LAT PEAK E' VEL: 9.5 CM/SEC
BH CV ECHO MEAS - LV DIASTOLIC VOL/BSA (35-75): 37.3 ML/M^2
BH CV ECHO MEAS - LV MASS(C)D: 180.1 GRAMS
BH CV ECHO MEAS - LV MASS(C)DI: 80 GRAMS/M^2
BH CV ECHO MEAS - LV MAX PG: 5.1 MMHG
BH CV ECHO MEAS - LV MEAN PG: 1.7 MMHG
BH CV ECHO MEAS - LV SYSTOLIC VOL/BSA (12-30): 12.4 ML/M^2
BH CV ECHO MEAS - LV V1 MAX: 113.2 CM/SEC
BH CV ECHO MEAS - LV V1 MEAN: 54.2 CM/SEC
BH CV ECHO MEAS - LV V1 VTI: 22.1 CM
BH CV ECHO MEAS - LVIDD: 4.8 CM
BH CV ECHO MEAS - LVIDS: 2.7 CM
BH CV ECHO MEAS - LVLD AP2: 8.2 CM
BH CV ECHO MEAS - LVLD AP4: 8.2 CM
BH CV ECHO MEAS - LVLS AP2: 6.7 CM
BH CV ECHO MEAS - LVLS AP4: 6.7 CM
BH CV ECHO MEAS - LVOT AREA (M): 3.1 CM^2
BH CV ECHO MEAS - LVOT AREA: 3.1 CM^2
BH CV ECHO MEAS - LVOT DIAM: 2 CM
BH CV ECHO MEAS - LVPWD: 1.1 CM
BH CV ECHO MEAS - MED PEAK E' VEL: 9.7 CM/SEC
BH CV ECHO MEAS - MV A DUR: 0.16 SEC
BH CV ECHO MEAS - MV A MAX VEL: 81.4 CM/SEC
BH CV ECHO MEAS - MV DEC SLOPE: 201.2 CM/SEC^2
BH CV ECHO MEAS - MV DEC TIME: 0.28 SEC
BH CV ECHO MEAS - MV E MAX VEL: 59.1 CM/SEC
BH CV ECHO MEAS - MV E/A: 0.73
BH CV ECHO MEAS - MV MAX PG: 3.1 MMHG
BH CV ECHO MEAS - MV MEAN PG: 1.4 MMHG
BH CV ECHO MEAS - MV P1/2T MAX VEL: 57.4 CM/SEC
BH CV ECHO MEAS - MV P1/2T: 83.6 MSEC
BH CV ECHO MEAS - MV V2 MAX: 88.2 CM/SEC
BH CV ECHO MEAS - MV V2 MEAN: 57.2 CM/SEC
BH CV ECHO MEAS - MV V2 VTI: 29.6 CM
BH CV ECHO MEAS - MVA P1/2T LCG: 3.8 CM^2
BH CV ECHO MEAS - MVA(P1/2T): 2.6 CM^2
BH CV ECHO MEAS - MVA(VTI): 2.3 CM^2
BH CV ECHO MEAS - PA MAX PG (FULL): 4.7 MMHG
BH CV ECHO MEAS - PA MAX PG: 6.5 MMHG
BH CV ECHO MEAS - PA V2 MAX: 127.6 CM/SEC
BH CV ECHO MEAS - PULM A REVS DUR: 0.12 SEC
BH CV ECHO MEAS - PULM A REVS VEL: 31.1 CM/SEC
BH CV ECHO MEAS - PULM DIAS VEL: 39.1 CM/SEC
BH CV ECHO MEAS - PULM S/D: 1.5
BH CV ECHO MEAS - PULM SYS VEL: 59.1 CM/SEC
BH CV ECHO MEAS - PVA(V,A): 1.9 CM^2
BH CV ECHO MEAS - PVA(V,D): 1.9 CM^2
BH CV ECHO MEAS - QP/QS: 0.93
BH CV ECHO MEAS - RV MAX PG: 1.9 MMHG
BH CV ECHO MEAS - RV MEAN PG: 1.1 MMHG
BH CV ECHO MEAS - RV V1 MAX: 68.1 CM/SEC
BH CV ECHO MEAS - RV V1 MEAN: 47.8 CM/SEC
BH CV ECHO MEAS - RV V1 VTI: 17.5 CM
BH CV ECHO MEAS - RVOT AREA: 3.6 CM^2
BH CV ECHO MEAS - RVOT DIAM: 2.1 CM
BH CV ECHO MEAS - SI(AO): 118.8 ML/M^2
BH CV ECHO MEAS - SI(CUBED): 40.4 ML/M^2
BH CV ECHO MEAS - SI(LVOT): 30.1 ML/M^2
BH CV ECHO MEAS - SI(MOD-SP2): 22.6 ML/M^2
BH CV ECHO MEAS - SI(MOD-SP4): 24.9 ML/M^2
BH CV ECHO MEAS - SI(TEICH): 36 ML/M^2
BH CV ECHO MEAS - SUP REN AO DIAM: 2.1 CM
BH CV ECHO MEAS - SV(AO): 267.7 ML
BH CV ECHO MEAS - SV(CUBED): 91.1 ML
BH CV ECHO MEAS - SV(LVOT): 67.9 ML
BH CV ECHO MEAS - SV(MOD-SP2): 51 ML
BH CV ECHO MEAS - SV(MOD-SP4): 56 ML
BH CV ECHO MEAS - SV(RVOT): 63.3 ML
BH CV ECHO MEAS - SV(TEICH): 81 ML
BH CV ECHO MEAS - TAPSE (>1.6): 3 CM
BH CV ECHO MEASUREMENTS AVERAGE E/E' RATIO: 6.16
BH CV XLRA - RV BASE: 4.1 CM
BH CV XLRA - RV LENGTH: 6.9 CM
BH CV XLRA - RV MID: 3.3 CM
BH CV XLRA - TDI S': 15 CM/SEC
LEFT ATRIUM VOLUME INDEX: 23 ML/M2
MAXIMAL PREDICTED HEART RATE: 156 BPM
SINUS: 3.1 CM
STJ: 2.2 CM
STRESS TARGET HR: 133 BPM

## 2021-07-27 PROCEDURE — 99214 OFFICE O/P EST MOD 30 MIN: CPT | Performed by: THORACIC SURGERY (CARDIOTHORACIC VASCULAR SURGERY)

## 2021-07-27 NOTE — TELEPHONE ENCOUNTER
I spoke with him regarding his echocardiogram and stress test results.  We are recommending a cardiac catheterization.  He is agreeable.      Charmarleny, please schedule him for a cardiac catheterization with Dr. Nicholas.

## 2021-07-27 NOTE — PROGRESS NOTES
Chief Complaint  Lung nodules, mediastinal lymphadenopathy  Subjective          Meng Pabon presents to [facility] in follow-up.  History of Present Illness  Mr. Pabon is a very pleasant 65-year-old gentleman who was undergoing work-up for complaints of flank pain and was found to have a lung nodule as well as mediastinal lymphadenopathy.  Other than the flank pain, he has no complaints.  He denies any significant shortness of breath or dyspnea on exertion.  He is able to walk greater than a block and up a flight of stairs without difficulty.  Patient has a history of basal cell carcinoma of the skin.  He is a never smoker.  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  He has a family history of cancer in his father with a basal cell carcinoma the skin, sister with breast cancer and a brother with pancreatic cancer.    Mr. Pabon presents today with a short interval CT scan.  He has no new complaints except for some chest tightness.  He underwent a stress yesterday which demonstrated some ST depressions.  Objective   Vital Signs:   /72   Pulse 53   Temp 98 °F (36.7 °C)   Wt 101 kg (223 lb 1.6 oz)   SpO2 96%   BMI 30.25 kg/m²     Physical Exam  Vitals and nursing note reviewed.   Constitutional:       Appearance: He is well-developed.   HENT:      Head: Normocephalic and atraumatic.      Nose: Nose normal.   Eyes:      Conjunctiva/sclera: Conjunctivae normal.   Pulmonary:      Effort: Pulmonary effort is normal.   Musculoskeletal:         General: Normal range of motion.   Skin:     General: Skin is warm and dry.   Neurological:      Mental Status: He is alert and oriented to person, place, and time.   Psychiatric:         Behavior: Behavior normal.         Thought Content: Thought content normal.         Judgment: Judgment normal.        Result Review :       Data reviewed: Radiologic studies :     I have independently reviewed the CT of the chest  performed on 7/20/2021 which demonstrates a 1.4 cm right upper lobe nodule with central calcification which is stable.  Sub-6 mm right upper lobe nodules.  Small subpleural calcified granuloma in the right lower lobe.  Mediastinal lymphadenopathy, largest of which is a subcarinal node measuring 1.6 cm which is stable.  No pleural pericardial effusion.  No new nodules, no new lymphadenopathy.     Assessment and Plan      Mr. Pabon is a pleasant 66-year-old gentleman with a 1.4 cm right upper lobe lung nodule that is stable with associated sub-6 mm nodules and mediastinal lymphadenopathy which is also stable.  Given the stability, these likely represent a benign etiology, but will need to be followed with serial CT scans.  I will plan to see him in 6 months with a CT of the chest to document stability.  This was discussed with him and his wife today.  Diagnoses and all orders for this visit:    1. Mediastinal lymphadenopathy (Primary)  -     CT Chest Without Contrast; Future    2. Lung nodules  -     CT Chest Without Contrast; Future      I spent 35 minutes caring for Meng on this date of service. This time includes time spent by me in the following activities:preparing for the visit, reviewing tests, obtaining and/or reviewing a separately obtained history, performing a medically appropriate examination and/or evaluation , counseling and educating the patient/family/caregiver, ordering medications, tests, or procedures, referring and communicating with other health care professionals , documenting information in the medical record and independently interpreting results and communicating that information with the patient/family/caregiver  Follow Up   No follow-ups on file.  Patient was given instructions and counseling regarding his condition or for health maintenance advice. Please see specific information pulled into the AVS if appropriate.

## 2021-07-28 ENCOUNTER — TRANSCRIBE ORDERS (OUTPATIENT)
Dept: CARDIOLOGY | Facility: CLINIC | Age: 66
End: 2021-07-28

## 2021-07-28 ENCOUNTER — OFFICE VISIT (OUTPATIENT)
Dept: ORTHOPEDIC SURGERY | Facility: CLINIC | Age: 66
End: 2021-07-28

## 2021-07-28 VITALS — HEIGHT: 71 IN | TEMPERATURE: 97.3 F | WEIGHT: 223 LBS | BODY MASS INDEX: 31.22 KG/M2

## 2021-07-28 DIAGNOSIS — Z13.6 SCREENING FOR ISCHEMIC HEART DISEASE: ICD-10-CM

## 2021-07-28 DIAGNOSIS — Z01.810 PRE-OPERATIVE CARDIOVASCULAR EXAMINATION: Primary | ICD-10-CM

## 2021-07-28 DIAGNOSIS — M25.511 RIGHT SHOULDER PAIN, UNSPECIFIED CHRONICITY: Primary | ICD-10-CM

## 2021-07-28 PROCEDURE — 99203 OFFICE O/P NEW LOW 30 MIN: CPT | Performed by: ORTHOPAEDIC SURGERY

## 2021-07-28 PROCEDURE — 20610 DRAIN/INJ JOINT/BURSA W/O US: CPT | Performed by: ORTHOPAEDIC SURGERY

## 2021-07-28 PROCEDURE — 73030 X-RAY EXAM OF SHOULDER: CPT | Performed by: ORTHOPAEDIC SURGERY

## 2021-07-28 RX ADMIN — METHYLPREDNISOLONE ACETATE 80 MG: 80 INJECTION, SUSPENSION INTRA-ARTICULAR; INTRALESIONAL; INTRAMUSCULAR; SOFT TISSUE at 16:39

## 2021-07-28 RX ADMIN — LIDOCAINE HYDROCHLORIDE 2 ML: 20 INJECTION, SOLUTION EPIDURAL; INFILTRATION; INTRACAUDAL; PERINEURAL at 16:39

## 2021-07-28 NOTE — PROGRESS NOTES
Patient: Meng Pabon    YOB: 1955    Medical Record Number: [id number]    Chief Complaints:  Right shoulder pain    History of Present Illness:     66 y.o. male patient who presents with a complaint of right shoulder pain.  He reports that the symptoms first started about 2 months ago when he was handing a pool liner to his son.  He had severe pain at the time and could hardly raise his arm.  He says that the pain and weakness have both gotten better.  He describes his current pain as moderate, constant and aching.  The pain is worse with certain reaching and lifting movements.  He denies any alleviating factors.  He denies any shooting pain down the arm, distal weakness, numbness or paresthesias.    Allergies: No Known Allergies    Home Medications:    Current Outpatient Medications:   •  aspirin (aspirin) 81 MG EC tablet, Take 1 tablet by mouth Daily. (Patient taking differently: Take 81 mg by mouth Daily. Take 1/2 tab daily), Disp: , Rfl:   •  atenolol (TENORMIN) 50 MG tablet, TAKE 1 AND 1/2 TABLETS BY MOUTH DAILY, Disp: 135 tablet, Rfl: 2  •  atorvastatin (LIPITOR) 40 MG tablet, TAKE 1 TABLET BY MOUTH DAILY, Disp: 90 tablet, Rfl: 2  •  cyclobenzaprine (FLEXERIL) 5 MG tablet, Take 1 tablet by mouth 3 (Three) Times a Day As Needed for Muscle Spasms., Disp: 21 tablet, Rfl: 0  •  isosorbide mononitrate (IMDUR) 30 MG 24 hr tablet, TAKE 1 TABLET BY MOUTH DAILY, Disp: 90 tablet, Rfl: 4  •  lisinopril (PRINIVIL,ZESTRIL) 20 MG tablet, TAKE 1 TABLET BY MOUTH DAILY, Disp: 30 tablet, Rfl: 5    Past Medical History:   Diagnosis Date   • Atypical chest pain    • Basal cell carcinoma     15 years prior from 2021   • CAD (coronary artery disease)    • Chronic total occlusion of coronary artery    • GERD (gastroesophageal reflux disease)    • Hyperlipidemia    • Hypertension    • Lung nodule        Past Surgical History:   Procedure Laterality Date   • CARDIAC CATHETERIZATION     • INNER EAR SURGERY    "      Social History     Occupational History   • Not on file   Tobacco Use   • Smoking status: Never Smoker   • Smokeless tobacco: Never Used   Vaping Use   • Vaping Use: Never used   Substance and Sexual Activity   • Alcohol use: Yes     Alcohol/week: 2.0 standard drinks     Types: 2 Cans of beer per week     Comment: nightly, caffeine - tea   • Drug use: Never   • Sexual activity: Yes     Partners: Female     Birth control/protection: Post-menopausal      Social History     Social History Narrative   • Not on file       Family History   Problem Relation Age of Onset   • Hypertension Father    • Skin cancer Father    • Heart disease Father    • Heart disease Sister    • No Known Problems Mother        Review of Systems:      Constitutional: Denies fever, shaking or chills   Eyes: Denies change in visual acuity   HEENT: Denies nasal congestion or sore throat   Respiratory: Denies cough or shortness of breath   Cardiovascular: Denies chest pain or edema  Endocrine: Denies tremors, palpitations, intolerance of heat or cold, polyuria, polydipsia.  GI: Denies abdominal pain, nausea, vomiting, bloody stools or diarrhea  : Denies frequency, urgency, incontinence, retention, or nocturia.  Musculoskeletal: Denies numbness, tingling or loss of motor function except as above  Integument: Denies rash, lesion or ulceration   Neurologic: Denies headache or focal weakness, deficits  Heme: Denies spontaneous or excessive bleeding, epistaxis, hematuria, melena, fatigue, enlarged or tender lymph nodes.      All other pertinent positives and negatives as noted above in HPI.    Physical Exam:   66 y.o. male    Vitals:    07/28/21 1612   Temp: 97.3 °F (36.3 °C)   Weight: 101 kg (223 lb)   Height: 180.3 cm (71\")     General:  Patient is awake and alert.  Appears in no acute distress or discomfort.    Psych:  Affect and demeanor are appropriate.    Eyes:  Conjunctiva and sclera appear grossly normal.  Eyes track well and EOM seem to " be intact.    Ears:  No gross abnormalities.  Hearing adequate for the exam.    Cardiovascular:  Regular rate and rhythm.    Lungs:  Good chest expansion.  Breathing unlabored.    Lymph:  No palpable adenopathy about neck or axilla.    Neck:  Supple.  Normal ROM.  Negative Spurling's for shoulder or arm pain.    Right upper extremity:  Skin is benign.  No gross abnormalities on inspection including any atrophy, swellings, or masses.  No palpable masses or adenopathy.  Mild upper biceps groove tenderness but he has a negative speeds and Yergason's maneuver.  He does not have any significant tenderness over the acromioclavicular joint.  Full shoulder motion.  No evident instability or apprehension.  Positive Neer, Wagner.  Positive Murray's maneuver.  Subtle weakness and mild discomfort with forward elevation in the scapular plane.  Good strength with internal and external rotation.  Good strength in the deltoid, biceps, triceps, and .  Intact sensation throughout the arm.  Brisk cap refill.  Palpable radial pulse.  Good skin turgor.         Radiology:   AP, scapular Y, and axillary views of the right shoulder are ordered by myself and reviewed to evaluate the patient's complaint.  No comparison films are immediately available.  The x-rays show advanced acromioclavicular arthritis.  There are no obvious acute abnormalities, lesions, masses, significant ulnohumeral degenerative changes, or other concerning findings.  The acromiohumeral interval is normal.  Glenoid version appears normal as well.    Assessment/Plan:   Right rotator cuff tendinopathy versus tear, subacromial/subdeltoid bursitis    He could have a small or partial-thickness rotator cuff tear.  We discussed the natural history of rotator cuff tears and risk of potential tear progression.  We thoroughly discussed options in detail including a trial of conservative treatment versus further work-up and potential surgical options. He has acknowledged  understanding of this information.  The patient would prefer to pursue nonsurgical management at this time.  The risk, benefits and alternatives to an injection were thoroughly discussed.  He consented and the injection was performed as described below.  I have entered a referral to physical therapy.  He will follow-up with me as needed going forward.    Giovany Mijares MD    07/28/2021    CC to aCrlos Riley APRN    Large Joint Arthrocentesis: R subacromial bursa  Date/Time: 7/28/2021 4:39 PM  Consent given by: patient  Site marked: site marked  Timeout: Immediately prior to procedure a time out was called to verify the correct patient, procedure, equipment, support staff and site/side marked as required   Supporting Documentation  Indications: pain and joint swelling   Procedure Details  Location: shoulder - R subacromial bursa  Preparation: Patient was prepped and draped in the usual sterile fashion  Needle gauge: 21g.  Approach: anterolateral  Medications administered: 80 mg methylPREDNISolone acetate 80 MG/ML; 2 mL lidocaine PF 2% 2 %  Patient tolerance: patient tolerated the procedure well with no immediate complications

## 2021-07-29 RX ORDER — METHYLPREDNISOLONE ACETATE 80 MG/ML
80 INJECTION, SUSPENSION INTRA-ARTICULAR; INTRALESIONAL; INTRAMUSCULAR; SOFT TISSUE
Status: COMPLETED | OUTPATIENT
Start: 2021-07-28 | End: 2021-07-28

## 2021-07-29 RX ORDER — LIDOCAINE HYDROCHLORIDE 20 MG/ML
2 INJECTION, SOLUTION EPIDURAL; INFILTRATION; INTRACAUDAL; PERINEURAL
Status: COMPLETED | OUTPATIENT
Start: 2021-07-28 | End: 2021-07-28

## 2021-08-02 ENCOUNTER — LAB (OUTPATIENT)
Dept: LAB | Facility: HOSPITAL | Age: 66
End: 2021-08-02

## 2021-08-02 DIAGNOSIS — Z01.810 PRE-OPERATIVE CARDIOVASCULAR EXAMINATION: ICD-10-CM

## 2021-08-02 DIAGNOSIS — Z13.6 SCREENING FOR ISCHEMIC HEART DISEASE: ICD-10-CM

## 2021-08-02 LAB
ANION GAP SERPL CALCULATED.3IONS-SCNC: 5.1 MMOL/L (ref 5–15)
BASOPHILS # BLD AUTO: 0.02 10*3/MM3 (ref 0–0.2)
BASOPHILS NFR BLD AUTO: 0.3 % (ref 0–1.5)
BUN SERPL-MCNC: 17 MG/DL (ref 8–23)
BUN/CREAT SERPL: 19.8 (ref 7–25)
CALCIUM SPEC-SCNC: 8.8 MG/DL (ref 8.6–10.5)
CHLORIDE SERPL-SCNC: 102 MMOL/L (ref 98–107)
CO2 SERPL-SCNC: 26.9 MMOL/L (ref 22–29)
CREAT SERPL-MCNC: 0.86 MG/DL (ref 0.76–1.27)
DEPRECATED RDW RBC AUTO: 39.6 FL (ref 37–54)
EOSINOPHIL # BLD AUTO: 0.06 10*3/MM3 (ref 0–0.4)
EOSINOPHIL NFR BLD AUTO: 0.8 % (ref 0.3–6.2)
ERYTHROCYTE [DISTWIDTH] IN BLOOD BY AUTOMATED COUNT: 12.7 % (ref 12.3–15.4)
GFR SERPL CREATININE-BSD FRML MDRD: 90 ML/MIN/1.73
GLUCOSE SERPL-MCNC: 95 MG/DL (ref 65–99)
HCT VFR BLD AUTO: 43.9 % (ref 37.5–51)
HGB BLD-MCNC: 14.8 G/DL (ref 13–17.7)
IMM GRANULOCYTES # BLD AUTO: 0.04 10*3/MM3 (ref 0–0.05)
IMM GRANULOCYTES NFR BLD AUTO: 0.5 % (ref 0–0.5)
LYMPHOCYTES # BLD AUTO: 2.59 10*3/MM3 (ref 0.7–3.1)
LYMPHOCYTES NFR BLD AUTO: 35.1 % (ref 19.6–45.3)
MCH RBC QN AUTO: 28.9 PG (ref 26.6–33)
MCHC RBC AUTO-ENTMCNC: 33.7 G/DL (ref 31.5–35.7)
MCV RBC AUTO: 85.7 FL (ref 79–97)
MONOCYTES # BLD AUTO: 0.66 10*3/MM3 (ref 0.1–0.9)
MONOCYTES NFR BLD AUTO: 9 % (ref 5–12)
NEUTROPHILS NFR BLD AUTO: 4 10*3/MM3 (ref 1.7–7)
NEUTROPHILS NFR BLD AUTO: 54.3 % (ref 42.7–76)
NRBC BLD AUTO-RTO: 0 /100 WBC (ref 0–0.2)
PLATELET # BLD AUTO: 248 10*3/MM3 (ref 140–450)
PMV BLD AUTO: 9.5 FL (ref 6–12)
POTASSIUM SERPL-SCNC: 4.2 MMOL/L (ref 3.5–5.2)
RBC # BLD AUTO: 5.12 10*6/MM3 (ref 4.14–5.8)
SODIUM SERPL-SCNC: 134 MMOL/L (ref 136–145)
WBC # BLD AUTO: 7.37 10*3/MM3 (ref 3.4–10.8)

## 2021-08-02 PROCEDURE — 85025 COMPLETE CBC W/AUTO DIFF WBC: CPT

## 2021-08-02 PROCEDURE — 80048 BASIC METABOLIC PNL TOTAL CA: CPT

## 2021-08-02 PROCEDURE — 36415 COLL VENOUS BLD VENIPUNCTURE: CPT

## 2021-08-03 ENCOUNTER — HOSPITAL ENCOUNTER (OUTPATIENT)
Facility: HOSPITAL | Age: 66
Discharge: HOME | End: 2021-08-04
Attending: INTERNAL MEDICINE | Admitting: INTERNAL MEDICINE

## 2021-08-03 DIAGNOSIS — E78.2 MIXED HYPERLIPIDEMIA: ICD-10-CM

## 2021-08-03 DIAGNOSIS — I25.10 CORONARY ARTERY DISEASE INVOLVING NATIVE CORONARY ARTERY OF NATIVE HEART WITHOUT ANGINA PECTORIS: ICD-10-CM

## 2021-08-03 DIAGNOSIS — Z95.5 S/P DRUG ELUTING CORONARY STENT PLACEMENT: ICD-10-CM

## 2021-08-03 DIAGNOSIS — I25.82 CHRONIC TOTAL OCCLUSION OF CORONARY ARTERY: ICD-10-CM

## 2021-08-03 DIAGNOSIS — I10 ESSENTIAL HYPERTENSION: Primary | ICD-10-CM

## 2021-08-03 LAB
QT INTERVAL: 372 MS
SARS-COV-2 ORF1AB RESP QL NAA+PROBE: NOT DETECTED
TROPONIN T SERPL-MCNC: <0.01 NG/ML (ref 0–0.03)

## 2021-08-03 PROCEDURE — C1887 CATHETER, GUIDING: HCPCS | Performed by: INTERNAL MEDICINE

## 2021-08-03 PROCEDURE — C1769 GUIDE WIRE: HCPCS | Performed by: INTERNAL MEDICINE

## 2021-08-03 PROCEDURE — 25010000002 HEPARIN (PORCINE) PER 1000 UNITS: Performed by: INTERNAL MEDICINE

## 2021-08-03 PROCEDURE — U0004 COV-19 TEST NON-CDC HGH THRU: HCPCS | Performed by: INTERNAL MEDICINE

## 2021-08-03 PROCEDURE — 93005 ELECTROCARDIOGRAM TRACING: CPT | Performed by: INTERNAL MEDICINE

## 2021-08-03 PROCEDURE — C1894 INTRO/SHEATH, NON-LASER: HCPCS | Performed by: INTERNAL MEDICINE

## 2021-08-03 PROCEDURE — 0 IOPAMIDOL PER 1 ML: Performed by: INTERNAL MEDICINE

## 2021-08-03 PROCEDURE — 92928 PRQ TCAT PLMT NTRAC ST 1 LES: CPT | Performed by: INTERNAL MEDICINE

## 2021-08-03 PROCEDURE — G0378 HOSPITAL OBSERVATION PER HR: HCPCS

## 2021-08-03 PROCEDURE — 85347 COAGULATION TIME ACTIVATED: CPT

## 2021-08-03 PROCEDURE — 25010000002 MIDAZOLAM PER 1 MG: Performed by: INTERNAL MEDICINE

## 2021-08-03 PROCEDURE — C1874 STENT, COATED/COV W/DEL SYS: HCPCS | Performed by: INTERNAL MEDICINE

## 2021-08-03 PROCEDURE — C1725 CATH, TRANSLUMIN NON-LASER: HCPCS | Performed by: INTERNAL MEDICINE

## 2021-08-03 PROCEDURE — 93458 L HRT ARTERY/VENTRICLE ANGIO: CPT | Performed by: INTERNAL MEDICINE

## 2021-08-03 PROCEDURE — 84484 ASSAY OF TROPONIN QUANT: CPT | Performed by: INTERNAL MEDICINE

## 2021-08-03 PROCEDURE — C9600 PERC DRUG-EL COR STENT SING: HCPCS | Performed by: INTERNAL MEDICINE

## 2021-08-03 PROCEDURE — 99153 MOD SED SAME PHYS/QHP EA: CPT | Performed by: INTERNAL MEDICINE

## 2021-08-03 PROCEDURE — 25010000002 FENTANYL CITRATE (PF) 50 MCG/ML SOLUTION: Performed by: INTERNAL MEDICINE

## 2021-08-03 PROCEDURE — C9803 HOPD COVID-19 SPEC COLLECT: HCPCS

## 2021-08-03 PROCEDURE — 99152 MOD SED SAME PHYS/QHP 5/>YRS: CPT | Performed by: INTERNAL MEDICINE

## 2021-08-03 DEVICE — XIENCE SIERRA™ EVEROLIMUS ELUTING CORONARY STENT SYSTEM 3.50 MM X 23 MM / RAPID-EXCHANGE
Type: IMPLANTABLE DEVICE | Status: FUNCTIONAL
Brand: XIENCE SIERRA™

## 2021-08-03 RX ORDER — SODIUM CHLORIDE 0.9 % (FLUSH) 0.9 %
10 SYRINGE (ML) INJECTION AS NEEDED
Status: DISCONTINUED | OUTPATIENT
Start: 2021-08-03 | End: 2021-08-03

## 2021-08-03 RX ORDER — SODIUM CHLORIDE 9 MG/ML
75 INJECTION, SOLUTION INTRAVENOUS CONTINUOUS
Status: DISCONTINUED | OUTPATIENT
Start: 2021-08-03 | End: 2021-08-04 | Stop reason: HOSPADM

## 2021-08-03 RX ORDER — LIDOCAINE HYDROCHLORIDE 20 MG/ML
INJECTION, SOLUTION INFILTRATION; PERINEURAL AS NEEDED
Status: DISCONTINUED | OUTPATIENT
Start: 2021-08-03 | End: 2021-08-03 | Stop reason: HOSPADM

## 2021-08-03 RX ORDER — ISOSORBIDE MONONITRATE 30 MG/1
30 TABLET, EXTENDED RELEASE ORAL DAILY
Status: DISCONTINUED | OUTPATIENT
Start: 2021-08-03 | End: 2021-08-04 | Stop reason: HOSPADM

## 2021-08-03 RX ORDER — ONDANSETRON 4 MG/1
4 TABLET, FILM COATED ORAL EVERY 6 HOURS PRN
Status: DISCONTINUED | OUTPATIENT
Start: 2021-08-03 | End: 2021-08-04 | Stop reason: HOSPADM

## 2021-08-03 RX ORDER — PRASUGREL 10 MG/1
10 TABLET, FILM COATED ORAL DAILY
Status: DISCONTINUED | OUTPATIENT
Start: 2021-08-04 | End: 2021-08-04 | Stop reason: HOSPADM

## 2021-08-03 RX ORDER — LISINOPRIL 20 MG/1
20 TABLET ORAL DAILY
Status: DISCONTINUED | OUTPATIENT
Start: 2021-08-03 | End: 2021-08-04 | Stop reason: HOSPADM

## 2021-08-03 RX ORDER — LIDOCAINE HYDROCHLORIDE 10 MG/ML
0.1 INJECTION, SOLUTION EPIDURAL; INFILTRATION; INTRACAUDAL; PERINEURAL ONCE AS NEEDED
Status: DISCONTINUED | OUTPATIENT
Start: 2021-08-03 | End: 2021-08-03

## 2021-08-03 RX ORDER — NITROGLYCERIN 0.4 MG/1
0.4 TABLET SUBLINGUAL
Status: DISCONTINUED | OUTPATIENT
Start: 2021-08-03 | End: 2021-08-04 | Stop reason: HOSPADM

## 2021-08-03 RX ORDER — ONDANSETRON 2 MG/ML
4 INJECTION INTRAMUSCULAR; INTRAVENOUS EVERY 6 HOURS PRN
Status: DISCONTINUED | OUTPATIENT
Start: 2021-08-03 | End: 2021-08-04 | Stop reason: HOSPADM

## 2021-08-03 RX ORDER — ASPIRIN 81 MG/1
81 TABLET ORAL DAILY
Status: DISCONTINUED | OUTPATIENT
Start: 2021-08-03 | End: 2021-08-04 | Stop reason: HOSPADM

## 2021-08-03 RX ORDER — SODIUM CHLORIDE 0.9 % (FLUSH) 0.9 %
3 SYRINGE (ML) INJECTION EVERY 12 HOURS SCHEDULED
Status: DISCONTINUED | OUTPATIENT
Start: 2021-08-03 | End: 2021-08-03

## 2021-08-03 RX ORDER — HYDROCODONE BITARTRATE AND ACETAMINOPHEN 5; 325 MG/1; MG/1
1 TABLET ORAL EVERY 4 HOURS PRN
Status: DISCONTINUED | OUTPATIENT
Start: 2021-08-03 | End: 2021-08-04 | Stop reason: HOSPADM

## 2021-08-03 RX ORDER — SODIUM CHLORIDE 9 MG/ML
100 INJECTION, SOLUTION INTRAVENOUS CONTINUOUS
Status: DISCONTINUED | OUTPATIENT
Start: 2021-08-03 | End: 2021-08-04 | Stop reason: HOSPADM

## 2021-08-03 RX ORDER — FENTANYL CITRATE 50 UG/ML
INJECTION, SOLUTION INTRAMUSCULAR; INTRAVENOUS AS NEEDED
Status: DISCONTINUED | OUTPATIENT
Start: 2021-08-03 | End: 2021-08-03 | Stop reason: HOSPADM

## 2021-08-03 RX ORDER — MIDAZOLAM HYDROCHLORIDE 1 MG/ML
INJECTION INTRAMUSCULAR; INTRAVENOUS AS NEEDED
Status: DISCONTINUED | OUTPATIENT
Start: 2021-08-03 | End: 2021-08-03 | Stop reason: HOSPADM

## 2021-08-03 RX ORDER — ACETAMINOPHEN 325 MG/1
650 TABLET ORAL EVERY 4 HOURS PRN
Status: DISCONTINUED | OUTPATIENT
Start: 2021-08-03 | End: 2021-08-04 | Stop reason: HOSPADM

## 2021-08-03 RX ORDER — ASPIRIN 325 MG
TABLET ORAL AS NEEDED
Status: DISCONTINUED | OUTPATIENT
Start: 2021-08-03 | End: 2021-08-03 | Stop reason: HOSPADM

## 2021-08-03 RX ORDER — PRASUGREL 10 MG/1
TABLET, FILM COATED ORAL AS NEEDED
Status: DISCONTINUED | OUTPATIENT
Start: 2021-08-03 | End: 2021-08-03 | Stop reason: HOSPADM

## 2021-08-03 RX ORDER — ASPIRIN 81 MG/1
81 TABLET ORAL DAILY
Status: DISCONTINUED | OUTPATIENT
Start: 2021-08-03 | End: 2021-08-03 | Stop reason: SDUPTHER

## 2021-08-03 RX ORDER — HEPARIN SODIUM 1000 [USP'U]/ML
INJECTION, SOLUTION INTRAVENOUS; SUBCUTANEOUS AS NEEDED
Status: DISCONTINUED | OUTPATIENT
Start: 2021-08-03 | End: 2021-08-03 | Stop reason: HOSPADM

## 2021-08-03 RX ORDER — MORPHINE SULFATE 2 MG/ML
1 INJECTION, SOLUTION INTRAMUSCULAR; INTRAVENOUS EVERY 4 HOURS PRN
Status: DISCONTINUED | OUTPATIENT
Start: 2021-08-03 | End: 2021-08-04 | Stop reason: HOSPADM

## 2021-08-03 RX ORDER — NALOXONE HCL 0.4 MG/ML
0.4 VIAL (ML) INJECTION
Status: DISCONTINUED | OUTPATIENT
Start: 2021-08-03 | End: 2021-08-04 | Stop reason: HOSPADM

## 2021-08-03 RX ORDER — ATORVASTATIN CALCIUM 20 MG/1
40 TABLET, FILM COATED ORAL DAILY
Status: DISCONTINUED | OUTPATIENT
Start: 2021-08-03 | End: 2021-08-04 | Stop reason: HOSPADM

## 2021-08-03 RX ADMIN — ISOSORBIDE MONONITRATE 30 MG: 30 TABLET ORAL at 22:00

## 2021-08-03 RX ADMIN — LISINOPRIL 20 MG: 20 TABLET ORAL at 22:00

## 2021-08-03 RX ADMIN — ATORVASTATIN CALCIUM 40 MG: 20 TABLET, FILM COATED ORAL at 22:00

## 2021-08-03 RX ADMIN — SODIUM CHLORIDE 100 ML/HR: 9 INJECTION, SOLUTION INTRAVENOUS at 11:05

## 2021-08-03 RX ADMIN — ATENOLOL 75 MG: 50 TABLET ORAL at 21:59

## 2021-08-03 RX ADMIN — SODIUM CHLORIDE 75 ML/HR: 9 INJECTION, SOLUTION INTRAVENOUS at 08:27

## 2021-08-03 NOTE — CONSULTS
Met with patient and wife, discussed benefits of cardiac rehab. Provided phase II information along with the contact information for cardiac rehab here at [facility]. Patient declined, stated he could not find the time to attend due to his work schedule. Reviewed cardiovascular risk factors with patient and  Provided him with educational material.

## 2021-08-03 NOTE — PLAN OF CARE
Goal Outcome Evaluation:  Plan of Care Reviewed With: patient        Progress: improving  Outcome Summary: Pt. s/p L heart cath with stent placement. R radial access C/D/I. SB to SR on the monitor. All other VSS. No c/o pain. WCTM.

## 2021-08-04 ENCOUNTER — TELEPHONE (OUTPATIENT)
Dept: ORTHOPEDIC SURGERY | Facility: CLINIC | Age: 66
End: 2021-08-04

## 2021-08-04 VITALS
DIASTOLIC BLOOD PRESSURE: 80 MMHG | TEMPERATURE: 97.4 F | OXYGEN SATURATION: 94 % | SYSTOLIC BLOOD PRESSURE: 134 MMHG | RESPIRATION RATE: 16 BRPM | BODY MASS INDEX: 30.52 KG/M2 | HEART RATE: 60 BPM | HEIGHT: 71 IN | WEIGHT: 218 LBS

## 2021-08-04 LAB
ACT BLD: 296 SECONDS (ref 82–152)
ANION GAP SERPL CALCULATED.3IONS-SCNC: 11.5 MMOL/L (ref 5–15)
BUN SERPL-MCNC: 18 MG/DL (ref 8–23)
BUN/CREAT SERPL: 21.7 (ref 7–25)
CALCIUM SPEC-SCNC: 8.7 MG/DL (ref 8.6–10.5)
CHLORIDE SERPL-SCNC: 105 MMOL/L (ref 98–107)
CHOLEST SERPL-MCNC: 143 MG/DL (ref 0–200)
CO2 SERPL-SCNC: 19.5 MMOL/L (ref 22–29)
CREAT SERPL-MCNC: 0.83 MG/DL (ref 0.76–1.27)
DEPRECATED RDW RBC AUTO: 39.1 FL (ref 37–54)
ERYTHROCYTE [DISTWIDTH] IN BLOOD BY AUTOMATED COUNT: 12.7 % (ref 12.3–15.4)
GFR SERPL CREATININE-BSD FRML MDRD: 93 ML/MIN/1.73
GLUCOSE SERPL-MCNC: 94 MG/DL (ref 65–99)
HBA1C MFR BLD: 5.8 % (ref 4.8–5.6)
HCT VFR BLD AUTO: 43.7 % (ref 37.5–51)
HDLC SERPL-MCNC: 43 MG/DL (ref 40–60)
HGB BLD-MCNC: 14.7 G/DL (ref 13–17.7)
LDLC SERPL CALC-MCNC: 85 MG/DL (ref 0–100)
LDLC/HDLC SERPL: 1.96 {RATIO}
MCH RBC QN AUTO: 28.6 PG (ref 26.6–33)
MCHC RBC AUTO-ENTMCNC: 33.6 G/DL (ref 31.5–35.7)
MCV RBC AUTO: 85 FL (ref 79–97)
PLATELET # BLD AUTO: 252 10*3/MM3 (ref 140–450)
PMV BLD AUTO: 9.7 FL (ref 6–12)
POTASSIUM SERPL-SCNC: 4.4 MMOL/L (ref 3.5–5.2)
QT INTERVAL: 396 MS
RBC # BLD AUTO: 5.14 10*6/MM3 (ref 4.14–5.8)
SODIUM SERPL-SCNC: 136 MMOL/L (ref 136–145)
TRIGL SERPL-MCNC: 78 MG/DL (ref 0–150)
VLDLC SERPL-MCNC: 15 MG/DL (ref 5–40)
WBC # BLD AUTO: 7.94 10*3/MM3 (ref 3.4–10.8)

## 2021-08-04 PROCEDURE — G0378 HOSPITAL OBSERVATION PER HR: HCPCS

## 2021-08-04 PROCEDURE — 93010 ELECTROCARDIOGRAM REPORT: CPT | Performed by: INTERNAL MEDICINE

## 2021-08-04 PROCEDURE — 85027 COMPLETE CBC AUTOMATED: CPT | Performed by: INTERNAL MEDICINE

## 2021-08-04 PROCEDURE — 83036 HEMOGLOBIN GLYCOSYLATED A1C: CPT | Performed by: INTERNAL MEDICINE

## 2021-08-04 PROCEDURE — 93005 ELECTROCARDIOGRAM TRACING: CPT | Performed by: INTERNAL MEDICINE

## 2021-08-04 PROCEDURE — 80061 LIPID PANEL: CPT | Performed by: INTERNAL MEDICINE

## 2021-08-04 PROCEDURE — 80048 BASIC METABOLIC PNL TOTAL CA: CPT | Performed by: INTERNAL MEDICINE

## 2021-08-04 PROCEDURE — 99217 PR OBSERVATION CARE DISCHARGE MANAGEMENT: CPT | Performed by: INTERNAL MEDICINE

## 2021-08-04 RX ORDER — PRASUGREL 10 MG/1
10 TABLET, FILM COATED ORAL DAILY
Qty: 30 TABLET | Refills: 11 | Status: SHIPPED | OUTPATIENT
Start: 2021-08-05 | End: 2022-01-19

## 2021-08-04 RX ORDER — NITROGLYCERIN 0.4 MG/1
0.4 TABLET SUBLINGUAL
Qty: 30 TABLET | Refills: 6 | Status: SHIPPED | OUTPATIENT
Start: 2021-08-04

## 2021-08-04 RX ADMIN — PRASUGREL 10 MG: 10 TABLET, FILM COATED ORAL at 08:28

## 2021-08-04 RX ADMIN — ASPIRIN 81 MG: 81 TABLET, COATED ORAL at 08:28

## 2021-08-04 NOTE — DISCHARGE SUMMARY
Date of Discharge:  8/4/2021  Date of Admit: 8/3/2021    Discharge Diagnosis:  1.  Abnormal stress test  2.  Coronary artery disease with prior  of the LAD.  PCI of the circumflex coronary artery during this admission.  3.  Essential hypertension  4.  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  He was scheduled for coronary angiography.  He underwent PCI of the circumflex due to a 90% proximal circumflex coronary artery stenosis.  He had no change in his  of the LAD that fills via right to left collaterals.  He did well.  He is appropriate for discharge home today.      Procedures Performed  Procedure(s):  Coronary angiography  Left Heart Cath  Left ventriculography  Stent MISSY coronary      Conclusions:   1. Left main: Normal  2. LAD: Chronic total occlusion mid segment.  Discrete 50 to 60% proximal diagonal stenosis.  LAD fills via right to left collaterals.  3. LCX: Ectatic proximal vessel with a calcified discrete 90% proximal stenosis.  Discrete 50% mid to distal stenosis.  4. RCA: Calcified vessel.  Diffuse 10 to 20% mid vessel stenosis  5.  Normal left ventricular size and systolic function with mild anterolateral wall hypokinesis distally.  5.  Successful PCI of the proximal to mid circumflex with a 3.5 x 23 mm Xience Vanna drug-eluting stent, postdilated to high pressure with a 3.75 mm NC trek balloon.       Consults     No orders found for last 30 day(s).            Discharge Physical Exam:  Temp:  [97.4 °F (36.3 °C)-98.4 °F (36.9 °C)] 97.4 °F (36.3 °C)  Heart Rate:  [51-79] 60  Resp:  [16-26] 16  BP: (100-154)/(62-93) 134/80    Intake/Output Summary (Last 24 hours) at 8/4/2021 0848  Last data filed at 8/4/2021 0820  Gross per 24 hour   Intake 950 ml   Output 730 ml   Net 220 ml     Flowsheet Rows      First Filed  "Value   Admission Height  180.3 cm (71\") Documented at 08/03/2021 0819   Admission Weight  98.9 kg (218 lb) Documented at 08/03/2021 0819          General Appearance:    Alert, cooperative, in no acute distress   Head:    Normocephalic, without obvious abnormality, atraumatic       Neck/Lymph   No adenopathy, supple, no thyromegaly, no carotid bruit, no    JVD   Lungs:     Clear to auscultation bilaterally, no wheezes, rales, or     rhonchi    Cardiac:    Normal rate, regular rhythm, no murmur, no rub, no gallop   Chest Wall:    No abnormalities observed   GI:     Normal bowel sounds, soft, nontender, nondistended,            no rebound tenderness   Extremities:   No cyanosis, clubbing, or edema   Circulatory/Peripheral Vascular :   Pulses palpable and equal bilaterally   Integumentary:   No bleeding or rash. Normal temperature       Neurologic:   Cranial nerves 2 - 12 grossly intact, sensation intact             Discharge Medications     Discharge Medications      New Medications      Instructions Start Date   nitroglycerin 0.4 MG SL tablet  Commonly known as: NITROSTAT   0.4 mg, Sublingual, Every 5 Minutes PRN, Take no more than 3 doses in 15 minutes.      prasugrel 10 MG tablet  Commonly known as: EFFIENT   10 mg, Oral, Daily   Start Date: August 5, 2021        Continue These Medications      Instructions Start Date   aspirin 81 MG EC tablet   81 mg, Oral, Daily      atenolol 50 MG tablet  Commonly known as: TENORMIN   TAKE 1 AND 1/2 TABLETS BY MOUTH DAILY      atorvastatin 40 MG tablet  Commonly known as: LIPITOR   TAKE 1 TABLET BY MOUTH DAILY      isosorbide mononitrate 30 MG 24 hr tablet  Commonly known as: IMDUR   TAKE 1 TABLET BY MOUTH DAILY      lisinopril 20 MG tablet  Commonly known as: PRINIVIL,ZESTRIL   TAKE 1 TABLET BY MOUTH DAILY             Discharge Diet: cardiac     Activity at Discharge: ad tawana    Discharge disposition: Home    Condition on Discharge: Good     Follow-up Appointments  Future " Appointments   Date Time Provider Department Center   1/26/2022 10:30 AM JUNIOR CT 3 [facility] JUNIOR CT [facility]   1/31/2022  1:30 PM Leidy Vasquez MD [facility]   4/15/2022  8:00 AM Carlos Riley APRN [facility]     Additional Instructions for the Follow-ups that You Need to Schedule     Ambulatory Referral to Cardiac Rehab   As directed      Ambulatory Referral to Cardiac Rehab   As directed            Test Results Pending at Discharge       Han Nicholas MD  08/04/21  08:48 EDT    Greater than 30 min spent in reviewing records, discussion and examination of the patient and discussion with other members of the patient's medical team.     Dictated utilizing Dragon dictation

## 2021-08-04 NOTE — TELEPHONE ENCOUNTER
Per [facility] patient stated Cortisone injection helped, he would like to hold off on physical therapy at this time.     Closing referral.

## 2021-08-17 ENCOUNTER — OFFICE VISIT (OUTPATIENT)
Dept: CARDIOLOGY | Facility: CLINIC | Age: 66
End: 2021-08-17

## 2021-08-17 VITALS
SYSTOLIC BLOOD PRESSURE: 138 MMHG | HEIGHT: 71 IN | DIASTOLIC BLOOD PRESSURE: 84 MMHG | BODY MASS INDEX: 31.5 KG/M2 | WEIGHT: 225 LBS | HEART RATE: 70 BPM

## 2021-08-17 DIAGNOSIS — E78.2 MIXED HYPERLIPIDEMIA: ICD-10-CM

## 2021-08-17 DIAGNOSIS — I25.10 CORONARY ARTERY DISEASE INVOLVING NATIVE CORONARY ARTERY OF NATIVE HEART WITHOUT ANGINA PECTORIS: Primary | ICD-10-CM

## 2021-08-17 DIAGNOSIS — I10 ESSENTIAL HYPERTENSION: ICD-10-CM

## 2021-08-17 DIAGNOSIS — I25.82 CHRONIC TOTAL OCCLUSION OF CORONARY ARTERY: ICD-10-CM

## 2021-08-17 PROCEDURE — 99214 OFFICE O/P EST MOD 30 MIN: CPT | Performed by: NURSE PRACTITIONER

## 2021-08-17 PROCEDURE — 93000 ELECTROCARDIOGRAM COMPLETE: CPT | Performed by: NURSE PRACTITIONER

## 2021-08-17 NOTE — PROGRESS NOTES
Date of Office Visit: 2021  Encounter Provider: SARIKA Hampton  Place of Service: [facility] CARD[facility]OLOG[facility]  Patient Name: Meng Pabon  :1955    Chief Complaint   Patient presents with   • Coronary Artery Disease   • Hypertension   :     HPI: Meng Pabon is a 66 y.o. male who presents today for follow-up.  Old records have been obtained and reviewed by me.  He is a patient of Dr. Borges with a past cardiac history significant for hypertension, hyperlipidemia, and coronary artery disease.  He has a known  of the mid LAD with right to left collaterals for which he has been treated with isosorbide.   In July of this year, I saw him for follow-up.  Symptom wise he was feeling well.  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  Therefore, a nuclear stress test was ordered for further evaluation.  This revealed a small sized mildly severe area of ischemia in the apex and inferior wall along with upward sloping ST segment depression of 1 mm during stress.  Ultimately, he was referred for cardiac catheterization.  This revealed a discrete 90% lesion of the proximal to mid circumflex for which he underwent drug-eluting stent placement.  He is here today for follow-up.   He has been doing well.  He denies any chest pain, shortness of breath, palpitations, edema, syncope, bleeding difficulties or melena.  He has occasional dizziness with position changes.  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  He has been working hard on losing weight and has lost 15 pounds so far.    Past Medical History:   Diagnosis Date   • Atypical chest pain    • Basal cell carcinoma     15 years prior from    • CAD (coronary artery disease)    • Chronic total occlusion of coronary artery    • GERD (gastroesophageal reflux disease)    • Hyperlipidemia    • Hypertension    • Lung  nodule        Past Surgical History:   Procedure Laterality Date   • CARDIAC CATHETERIZATION     • CARDIAC CATHETERIZATION N/A 8/3/2021    Procedure: Coronary angiography;  Surgeon: Han Nicholas MD;  Location:  [facility];  Service: Cardiology;  Laterality: N/A;   • CARDIAC CATHETERIZATION N/A 8/3/2021    Procedure: Left Heart Cath;  Surgeon: Han Nicholas MD;  Location:  [facility];  Service: Cardiology;  Laterality: N/A;   • CARDIAC CATHETERIZATION N/A 8/3/2021    Procedure: Left ventriculography;  Surgeon: Han Nicholas MD;  Location:  [facility];  Service: Cardiology;  Laterality: N/A;   • CARDIAC CATHETERIZATION N/A 8/3/2021    Procedure: Stent MISSY coronary;  Surgeon: Han Nicholas MD;  Location:  [facility];  Service: Cardiology;  Laterality: N/A;   • INNER EAR SURGERY         Social History     Socioeconomic History   • Marital status:      Spouse name: Not on file   • Number of children: Not on file   • Years of education: Not on file   • Highest education level: Not on file   Tobacco Use   • Smoking status: Never Smoker   • Smokeless tobacco: Never Used   Vaping Use   • Vaping Use: Never used   Substance and Sexual Activity   • Alcohol use: Yes     Alcohol/week: 2.0 standard drinks     Types: 2 Cans of beer per week     Comment: nightly, caffeine - tea   • Drug use: Never   • Sexual activity: Yes     Partners: Female     Birth control/protection: Post-menopausal       Family History   Problem Relation Age of Onset   • Hypertension Father    • Skin cancer Father    • Heart disease Father    • Heart disease Sister    • No Known Problems Mother    • Pancreatic cancer Brother        Review of Systems   Constitutional: Negative.   Cardiovascular: Negative for chest pain, dyspnea on exertion, leg swelling, orthopnea, paroxysmal nocturnal dyspnea and syncope.   Respiratory: Negative.   "  Hematologic/Lymphatic: Negative for bleeding problem.   Musculoskeletal: Negative for falls.   Gastrointestinal: Negative for melena.   Neurological: Positive for dizziness. Negative for light-headedness.       No Known Allergies      Current Outpatient Medications:   •  aspirin (aspirin) 81 MG EC tablet, Take 1 tablet by mouth Daily., Disp: , Rfl:   •  atenolol (TENORMIN) 50 MG tablet, TAKE 1 AND 1/2 TABLETS BY MOUTH DAILY, Disp: 135 tablet, Rfl: 2  •  atorvastatin (LIPITOR) 40 MG tablet, TAKE 1 TABLET BY MOUTH DAILY, Disp: 90 tablet, Rfl: 2  •  isosorbide mononitrate (IMDUR) 30 MG 24 hr tablet, TAKE 1 TABLET BY MOUTH DAILY, Disp: 90 tablet, Rfl: 4  •  lisinopril (PRINIVIL,ZESTRIL) 20 MG tablet, TAKE 1 TABLET BY MOUTH DAILY, Disp: 30 tablet, Rfl: 5  •  nitroglycerin (NITROSTAT) 0.4 MG SL tablet, Place 1 tablet under the tongue Every 5 (Five) Minutes As Needed for Chest Pain (Only if SBP Greater Than 100). Take no more than 3 doses in 15 minutes., Disp: 30 tablet, Rfl: 6  •  prasugrel (EFFIENT) 10 MG tablet, Take 1 tablet by mouth Daily., Disp: 30 tablet, Rfl: 11      Objective:     Vitals:    08/17/21 1452   BP: 138/84   Pulse: 70   Weight: 102 kg (225 lb)   Height: 180.3 cm (71\")     Body mass index is 31.38 kg/m².    PHYSICAL EXAM:    Neck:      Vascular: No JVD.   Pulmonary:      Effort: Pulmonary effort is normal.      Breath sounds: Normal breath sounds.   Cardiovascular:      Normal rate. Regular rhythm.      Murmurs: There is no murmur.      No gallop. No click. No rub.   Pulses:     Intact distal pulses.   Skin:     Comments: Radial site well healed without erythema or edema. Proximal and distal pulses palpable. Good capillary refill.             ECG 12 Lead    Date/Time: 8/17/2021 3:11 PM  Performed by: Agustina Olvera APRN  Authorized by: Agustina Olvera APRN   Comparison: compared with previous ECG from 8/4/2021  Similar to previous ECG  Rhythm: sinus rhythm  Ectopy: atrial premature " contractions  Rate: normal  BPM: 70    Clinical impression: normal ECG  Comments: Indication: CAD              Assessment:       Diagnosis Plan   1. Coronary artery disease involving native coronary artery of native heart without angina pectoris  ECG 12 Lead   2. Chronic total occlusion of coronary artery     3. Essential hypertension     4. Mixed hyperlipidemia       Orders Placed This Encounter   Procedures   • ECG 12 Lead     This order was created via procedure documentation     Order Specific Question:   Release to patient     Answer:   Immediate          Plan:       1.  Coronary artery disease.  Status post drug-eluting stenting of the proximal to mid circumflex.  He denies any chest pain.  He is on aspirin and Effient as well as a high intensity statin.      2.  Hypertension.  His blood pressure is stable.  Continue current therapy with atenolol and lisinopril.      3.  Hyperlipidemia.  Lipid panel from 8/4/2021 revealed an LDL of 85 and an HDL of 43.  We discussed the possibility of increasing his atorvastatin to 80 mg to achieve an LDL of less than 70.  He prefers to continue working on weight loss and dietary changes, which I think is reasonable.      I think he is doing well.  He denies any symptoms of angina or heart failure.  He will follow-up with Dr. Nicholas on 9/20/2021.      As always, it has been a pleasure to participate in your patient's care.      Sincerely,         SARIKA Milian

## 2021-09-20 ENCOUNTER — OFFICE VISIT (OUTPATIENT)
Dept: CARDIOLOGY | Facility: CLINIC | Age: 66
End: 2021-09-20

## 2021-09-20 VITALS
HEART RATE: 63 BPM | HEIGHT: 71 IN | DIASTOLIC BLOOD PRESSURE: 80 MMHG | BODY MASS INDEX: 31.08 KG/M2 | WEIGHT: 222 LBS | SYSTOLIC BLOOD PRESSURE: 136 MMHG

## 2021-09-20 DIAGNOSIS — E78.2 MIXED HYPERLIPIDEMIA: ICD-10-CM

## 2021-09-20 DIAGNOSIS — I10 ESSENTIAL HYPERTENSION: ICD-10-CM

## 2021-09-20 DIAGNOSIS — I25.82 CHRONIC TOTAL OCCLUSION OF CORONARY ARTERY: ICD-10-CM

## 2021-09-20 DIAGNOSIS — I25.10 CORONARY ARTERY DISEASE INVOLVING NATIVE CORONARY ARTERY OF NATIVE HEART WITHOUT ANGINA PECTORIS: Primary | ICD-10-CM

## 2021-09-20 PROCEDURE — 99214 OFFICE O/P EST MOD 30 MIN: CPT | Performed by: INTERNAL MEDICINE

## 2021-09-20 PROCEDURE — 93000 ELECTROCARDIOGRAM COMPLETE: CPT | Performed by: INTERNAL MEDICINE

## 2021-09-20 NOTE — PROGRESS NOTES
Date of Office Visit: 21  Encounter Provider: Han Nicholas MD  Place of Service: [facility] CARDIOLOGY  Patient Name: Meng Pabon  :1955    Chief Complaint   Patient presents with   • Follow-up     from [facility]   • Coronary Artery Disease   :     HPI:   66 y.o. male who presents today for follow-up.   He has a past cardiac history significant for hypertension, hyperlipidemia, and coronary artery disease.  He has a known  of the mid LAD with right to left collaterals for which he has been treated with isosorbide.    Symptom wise he was feeling well.  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  Therefore, a nuclear stress test was ordered for further evaluation.  This revealed a small sized mildly severe area of ischemia in the apex and inferior wall along with upward sloping ST segment depression of 1 mm during stress.  Ultimately, he was referred for cardiac catheterization.  This revealed a discrete 90% lesion of the proximal to mid circumflex for which he underwent drug-eluting stent placement.   Since that time he has been doing well.  Denies any chest pain or dyspnea.  His blood pressure and heart rate are well controlled.  He denies any Right upper extremity discomfort.      Past Medical History:   Diagnosis Date   • Atypical chest pain    • Basal cell carcinoma     15 years prior from    • CAD (coronary artery disease)    • Chronic total occlusion of coronary artery    • GERD (gastroesophageal reflux disease)    • Hyperlipidemia    • Hypertension    • Lung nodule        Past Surgical History:   Procedure Laterality Date   • CARDIAC CATHETERIZATION     • CARDIAC CATHETERIZATION N/A 8/3/2021    Procedure: Coronary angiography;  Surgeon: Han Nicholas MD;  Location: [facility] INVASIVE LOCATION;  Service: Cardiology;  Laterality: N/A;   • CARDIAC  CATHETERIZATION N/A 8/3/2021    Procedure: Left Heart Cath;  Surgeon: Han Nicholas MD;  Location: [facility] CATH INVASIVE LOCATION;  Service: Cardiology;  Laterality: N/A;   • CARDIAC CATHETERIZATION N/A 8/3/2021    Procedure: Left ventriculography;  Surgeon: Han Nicholas MD;  Location: [facility] CATH INVASIVE LOCATION;  Service: Cardiology;  Laterality: N/A;   • CARDIAC CATHETERIZATION N/A 8/3/2021    Procedure: Stent MISSY coronary;  Surgeon: Han Nicholas MD;  Location: [facility] CATH INVASIVE LOCATION;  Service: Cardiology;  Laterality: N/A;   • INNER EAR SURGERY         Social History     Socioeconomic History   • Marital status:      Spouse name: Not on file   • Number of children: Not on file   • Years of education: Not on file   • Highest education level: Not on file   Tobacco Use   • Smoking status: Never Smoker   • Smokeless tobacco: Never Used   Vaping Use   • Vaping Use: Never used   Substance and Sexual Activity   • Alcohol use: Yes     Alcohol/week: 2.0 standard drinks     Types: 2 Cans of beer per week     Comment: nightly, caffeine - tea   • Drug use: Never   • Sexual activity: Yes     Partners: Female     Birth control/protection: Post-menopausal       Family History   Problem Relation Age of Onset   • Hypertension Father    • Skin cancer Father    • Heart disease Father    • Heart disease Sister    • No Known Problems Mother    • Pancreatic cancer Brother        Review of Systems   Constitutional: Negative. Negative for fever and malaise/fatigue.   HENT: Negative for nosebleeds and sore throat.    Eyes: Negative for blurred vision and double vision.   Cardiovascular: Negative for chest pain, claudication, dyspnea on exertion, leg swelling, orthopnea, palpitations, paroxysmal nocturnal dyspnea and syncope.   Respiratory: Negative.  Negative for cough, shortness of breath and snoring.    Endocrine: Negative for cold intolerance, heat intolerance and polydipsia.  "  Hematologic/Lymphatic: Negative for bleeding problem.   Skin: Negative for itching, poor wound healing and rash.   Musculoskeletal: Negative for falls, joint pain, joint swelling, muscle weakness and myalgias.   Gastrointestinal: Negative for abdominal pain, melena, nausea and vomiting.   Neurological: Positive for dizziness. Negative for light-headedness, loss of balance, seizures, vertigo and weakness.   Psychiatric/Behavioral: Negative for altered mental status and depression.       No Known Allergies      Current Outpatient Medications:   •  aspirin (aspirin) 81 MG EC tablet, Take 1 tablet by mouth Daily., Disp: , Rfl:   •  atenolol (TENORMIN) 50 MG tablet, TAKE 1 AND 1/2 TABLETS BY MOUTH DAILY, Disp: 135 tablet, Rfl: 2  •  atorvastatin (LIPITOR) 40 MG tablet, TAKE 1 TABLET BY MOUTH DAILY, Disp: 90 tablet, Rfl: 2  •  isosorbide mononitrate (IMDUR) 30 MG 24 hr tablet, TAKE 1 TABLET BY MOUTH DAILY, Disp: 90 tablet, Rfl: 4  •  lisinopril (PRINIVIL,ZESTRIL) 20 MG tablet, TAKE 1 TABLET BY MOUTH DAILY, Disp: 30 tablet, Rfl: 5  •  nitroglycerin (NITROSTAT) 0.4 MG SL tablet, Place 1 tablet under the tongue Every 5 (Five) Minutes As Needed for Chest Pain (Only if SBP Greater Than 100). Take no more than 3 doses in 15 minutes., Disp: 30 tablet, Rfl: 6  •  prasugrel (EFFIENT) 10 MG tablet, Take 1 tablet by mouth Daily., Disp: 30 tablet, Rfl: 11      Objective:     Vitals:    09/20/21 1504   Height: 180.3 cm (71\")     Body mass index is 31.38 kg/m².    PHYSICAL EXAM:    Constitutional:       Appearance: Well-developed.   Eyes:      General: No scleral icterus.     Conjunctiva/sclera: Conjunctivae normal.   HENT:      Head: Normocephalic and atraumatic.   Neck:      Thyroid: No thyromegaly.      Vascular: Normal carotid pulses. No carotid bruit, hepatojugular reflux or JVD.      Trachea: No tracheal deviation.   Pulmonary:      Effort: Pulmonary effort is normal. No respiratory distress.      Breath sounds: Normal breath " sounds.   Chest:      Chest wall: Not tender to palpatation.   Cardiovascular:      Normal rate. Regular rhythm.      Murmurs: There is no murmur.      No gallop. No click. No rub.   Abdominal:      General: Bowel sounds are normal. There is no distension.      Palpations: Abdomen is soft.      Tenderness: There is no abdominal tenderness.   Musculoskeletal:         General: No deformity.      Cervical back: Normal range of motion and neck supple. Skin:     Findings: No erythema or rash.      Comments:      Neurological:      Mental Status: Alert and oriented to person, place, and time.      Sensory: No sensory deficit.   Psychiatric:         Behavior: Behavior normal.           ECG 12 Lead    Date/Time: 9/20/2021 4:11 PM  Performed by: Han Nicholas MD  Authorized by: Han Nicholas MD   Comparison: compared with previous ECG from 8/17/2021  Similar to previous ECG  Rhythm: sinus rhythm  Rate: normal  QRS axis: normal    Clinical impression: normal ECG               8/3/2021  Conclusions:   1. Left main: Normal  2. LAD: Chronic total occlusion mid segment.  Discrete 50 to 60% proximal diagonal stenosis.  LAD fills via right to left collaterals.  3. LCX: Ectatic proximal vessel with a calcified discrete 90% proximal stenosis.  Discrete 50% mid to distal stenosis.  4. RCA: Calcified vessel.  Diffuse 10 to 20% mid vessel stenosis  5.  Normal left ventricular size and systolic function with mild anterolateral wall hypokinesis distally.  5.  Successful PCI of the proximal to mid circumflex with a 3.5 x 23 mm Xience Vanna drug-eluting stent, postdilated to high pressure with a 3.75 mm NC trek balloon.     7/26/2021  · Myocardial perfusion imaging indicates a small-sized, mildly severe area of ischemia located in the apex and inferior wall.  · Left ventricular ejection fraction is normal. (Calculated EF = 61%).  · Moderate risk for ischemic heart disease.  · An upward-sloping ST segment depression of 1  mm was noted during stress, beginning at 5 minutes of stress.      Assessment:      No diagnosis found.  No orders of the defined types were placed in this encounter.    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  He underwent coronary angiography with a 90% calcified proximal left circumflex coronary artery stenosis along with his previously documented  of the mid LAD.  He underwent PCI to the proximal circumflex as documented above and has been doing well since that time.  He denies any chest pain or dyspnea.  Blood pressure and heart rate are well controlled.    1.  Coronary artery disease.  Status post drug-eluting stenting of the proximal to mid circumflex.  He denies any chest pain.  He is on aspirin and Effient as well as a high intensity statin.  -No bleeding complications on dual antiplatelet therapy.  Lab work has been reviewed and no significant anemia.  -Continue atorvastatin at current dose.  We will continue to monitor lipid panel.  May need to increase in the future.  No myalgias.  No increase in transaminases.  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    2.  Hypertension.  His blood pressure is stable.  Continue current therapy with atenolol and lisinopril.    3.  Hyperlipidemia.  Lipid panel from 8/4/2021 revealed an LDL of 85 and an HDL of 43.

## 2021-10-12 ENCOUNTER — HOSPITAL ENCOUNTER (OUTPATIENT)
Dept: CARDIOLOGY | Facility: HOSPITAL | Age: 66
Discharge: HOME | End: 2021-10-12
Admitting: INTERNAL MEDICINE

## 2021-10-12 DIAGNOSIS — I25.10 CORONARY ARTERY DISEASE INVOLVING NATIVE CORONARY ARTERY OF NATIVE HEART WITHOUT ANGINA PECTORIS: ICD-10-CM

## 2021-10-12 DIAGNOSIS — I10 ESSENTIAL HYPERTENSION: ICD-10-CM

## 2021-10-12 LAB
BH CV STRESS BP STAGE 1: NORMAL
BH CV STRESS BP STAGE 2: NORMAL
BH CV STRESS DURATION MIN STAGE 1: 3
BH CV STRESS DURATION MIN STAGE 2: 3
BH CV STRESS DURATION SEC STAGE 1: 0
BH CV STRESS DURATION SEC STAGE 2: 0
BH CV STRESS DURATION SEC STAGE 3: 30
BH CV STRESS GRADE STAGE 1: 10
BH CV STRESS GRADE STAGE 2: 12
BH CV STRESS GRADE STAGE 3: 14
BH CV STRESS HR STAGE 1: 122
BH CV STRESS HR STAGE 2: 158
BH CV STRESS HR STAGE 3: 164
BH CV STRESS METS STAGE 1: 5
BH CV STRESS METS STAGE 2: 7.5
BH CV STRESS METS STAGE 3: 10
BH CV STRESS PROTOCOL 1: NORMAL
BH CV STRESS RECOVERY BP: NORMAL MMHG
BH CV STRESS RECOVERY HR: 100 BPM
BH CV STRESS SPEED STAGE 1: 1.7
BH CV STRESS SPEED STAGE 2: 2.5
BH CV STRESS SPEED STAGE 3: 3.4
BH CV STRESS STAGE 1: 1
BH CV STRESS STAGE 2: 2
BH CV STRESS STAGE 3: 3
MAXIMAL PREDICTED HEART RATE: 156 BPM
PERCENT MAX PREDICTED HR: 105.13 %
STRESS BASELINE BP: NORMAL MMHG
STRESS BASELINE HR: 76 BPM
STRESS PERCENT HR: 124 %
STRESS POST ESTIMATED WORKLOAD: 8 METS
STRESS POST EXERCISE DUR MIN: 6 MIN
STRESS POST EXERCISE DUR SEC: 30 SEC
STRESS POST PEAK BP: NORMAL MMHG
STRESS POST PEAK HR: 164 BPM
STRESS TARGET HR: 133 BPM

## 2021-10-12 PROCEDURE — 93018 CV STRESS TEST I&R ONLY: CPT | Performed by: INTERNAL MEDICINE

## 2021-10-12 PROCEDURE — 93017 CV STRESS TEST TRACING ONLY: CPT

## 2021-10-12 PROCEDURE — 93016 CV STRESS TEST SUPVJ ONLY: CPT | Performed by: INTERNAL MEDICINE

## 2021-10-13 ENCOUNTER — OFFICE VISIT (OUTPATIENT)
Dept: ORTHOPEDIC SURGERY | Facility: CLINIC | Age: 66
End: 2021-10-13

## 2021-10-13 VITALS — HEIGHT: 71 IN | BODY MASS INDEX: 31.08 KG/M2 | TEMPERATURE: 97.2 F | WEIGHT: 222 LBS

## 2021-10-13 DIAGNOSIS — M25.562 PAIN IN BOTH KNEES, UNSPECIFIED CHRONICITY: Primary | ICD-10-CM

## 2021-10-13 DIAGNOSIS — M17.10 ARTHRITIS OF KNEE: ICD-10-CM

## 2021-10-13 DIAGNOSIS — M25.561 PAIN IN BOTH KNEES, UNSPECIFIED CHRONICITY: Primary | ICD-10-CM

## 2021-10-13 PROCEDURE — 20610 DRAIN/INJ JOINT/BURSA W/O US: CPT | Performed by: ORTHOPAEDIC SURGERY

## 2021-10-13 PROCEDURE — 73562 X-RAY EXAM OF KNEE 3: CPT | Performed by: ORTHOPAEDIC SURGERY

## 2021-10-13 PROCEDURE — 99214 OFFICE O/P EST MOD 30 MIN: CPT | Performed by: ORTHOPAEDIC SURGERY

## 2021-10-13 RX ADMIN — LIDOCAINE HYDROCHLORIDE 2 ML: 10 INJECTION, SOLUTION EPIDURAL; INFILTRATION; INTRACAUDAL; PERINEURAL at 14:56

## 2021-10-13 RX ADMIN — METHYLPREDNISOLONE ACETATE 80 MG: 80 INJECTION, SUSPENSION INTRA-ARTICULAR; INTRALESIONAL; INTRAMUSCULAR; SOFT TISSUE at 14:56

## 2021-10-14 NOTE — PROGRESS NOTES
Patient:Meng Pabon    YOB: 1955    Medical Record Number:[id number]    Chief Complaints: Bilateral knee pain    History of Present Illness:     66 y.o. male patient who presents for evaluation of both knees.  This has been a longstanding issue dating back a number of years.  Current pain is moderate, 6 out of 10.  The pain is both throbbing and grinding.  He has noticed associated clicking and popping.  Symptoms are worse with working, driving and climbing stairs.    Allergies:No Known Allergies    Home Medications:    Current Outpatient Medications:   •  aspirin (aspirin) 81 MG EC tablet, Take 1 tablet by mouth Daily., Disp: , Rfl:   •  atenolol (TENORMIN) 50 MG tablet, TAKE 1 AND 1/2 TABLETS BY MOUTH DAILY, Disp: 135 tablet, Rfl: 2  •  atorvastatin (LIPITOR) 40 MG tablet, TAKE 1 TABLET BY MOUTH DAILY, Disp: 90 tablet, Rfl: 2  •  isosorbide mononitrate (IMDUR) 30 MG 24 hr tablet, TAKE 1 TABLET BY MOUTH DAILY, Disp: 90 tablet, Rfl: 4  •  lisinopril (PRINIVIL,ZESTRIL) 20 MG tablet, TAKE 1 TABLET BY MOUTH DAILY, Disp: 30 tablet, Rfl: 5  •  nitroglycerin (NITROSTAT) 0.4 MG SL tablet, Place 1 tablet under the tongue Every 5 (Five) Minutes As Needed for Chest Pain (Only if SBP Greater Than 100). Take no more than 3 doses in 15 minutes., Disp: 30 tablet, Rfl: 6  •  prasugrel (EFFIENT) 10 MG tablet, Take 1 tablet by mouth Daily., Disp: 30 tablet, Rfl: 11    Past Medical History:   Diagnosis Date   • Atypical chest pain    • Basal cell carcinoma     15 years prior from 2021   • CAD (coronary artery disease)    • Chronic total occlusion of coronary artery    • GERD (gastroesophageal reflux disease)    • Hyperlipidemia    • Hypertension    • Lung nodule        Past Surgical History:   Procedure Laterality Date   • CARDIAC CATHETERIZATION     • CARDIAC CATHETERIZATION N/A 8/3/2021    Procedure: Coronary angiography;  Surgeon: Han Nicholas MD;  Location: [facility] INVASIVE LOCATION;  Service:  Cardiology;  Laterality: N/A;   • CARDIAC CATHETERIZATION N/A 8/3/2021    Procedure: Left Heart Cath;  Surgeon: Han Nicholas MD;  Location: [facility] CATH INVASIVE LOCATION;  Service: Cardiology;  Laterality: N/A;   • CARDIAC CATHETERIZATION N/A 8/3/2021    Procedure: Left ventriculography;  Surgeon: Han Nicholas MD;  [facility];  Service: Cardiology;  Laterality: N/A;   • CARDIAC CATHETERIZATION N/A 8/3/2021    Procedure: Stent MISSY coronary;  Surgeon: Han Nicholas MD;  Location:  [facility]VASIVE LOC[facility];  Service: Cardiology;  Laterality: N/A;   • INNER EAR SURGERY         Social History     Occupational History   • Not on file   Tobacco Use   • Smoking status: Never Smoker   • Smokeless tobacco: Never Used   Vaping Use   • Vaping Use: Never used   Substance and Sexual Activity   • Alcohol use: Yes     Alcohol/week: 2.0 standard drinks     Types: 2 Cans of beer per week     Comment: nightly, caffeine - tea   • Drug use: Never   • Sexual activity: Yes     Partners: Female     Birth control/protection: Post-menopausal      Social History     Social History Narrative   • Not on file       Family History   Problem Relation Age of Onset   • Hypertension Father    • Skin cancer Father    • Heart disease Father    • Heart disease Sister    • No Known Problems Mother    • Pancreatic cancer Brother        Review of Systems:      Constitutional: Denies fever, shaking or chills   Eyes: Denies change in visual acuity   HEENT: Denies nasal congestion or sore throat   Respiratory: Denies cough or shortness of breath   Cardiovascular: Denies chest pain or edema  Endocrine: Denies tremors, palpitations, intolerance of heat or cold, polyuria, polydipsia.  GI: Denies abdominal pain, nausea, vomiting, bloody stools or diarrhea  : Denies frequency, urgency, incontinence, retention, or nocturia.  Musculoskeletal: Denies numbness, tingling or loss of motor function except as  "above  Integument: Denies rash, lesion or ulceration   Neurologic: Denies headache or focal weakness, deficits  Heme: Denies spontaneous or excessive bleeding, epistaxis, hematuria, melena, fatigue, enlarged or tender lymph nodes.      All other pertinent positives and negatives as noted above in HPI.    Physical Exam:66 y.o. male     Vitals:    10/13/21 1536   Temp: 97.2 °F (36.2 °C)   Weight: 101 kg (222 lb)   Height: 180.3 cm (71\")     General:  Patient is awake and alert.  Appears in no acute distress or discomfort.    Psych:  Affect and demeanor are appropriate.    Extremities: Bilateral knees are examined.  Skin is benign.  No atrophy, swelling or masses.  He has moderate medial compartment joint line tenderness bilaterally.  Trace effusion on the right but no effusion on the left.  He has symmetric range of motion.  He lacks just a few degrees of terminal extension bilaterally and can flex to approximately 125 degrees.  No instability.  Intact motor and sensory function throughout both legs and feet.  Brisk capillary refill.    Imaging: Bilateral AP, merchant and lateral views of the knees are ordered and reviewed to evaluate his complaint.  No comparison films are immediately available.  He appears to have moderate bilateral medial and patellofemoral compartment osteoarthritis with joint space narrowing, osteophyte formation and subchondral sclerosis.    Assessment/Plan: Bilateral knee osteoarthritis    We discussed his options.  He elected for bilateral knee corticosteroid injections.  The risk, benefits and alternatives were discussed including his elevated risk status due to anticoagulation.  He consented and the injections were performed as described below.  He will follow-up with me as needed.    Giovany Mijares MD    10/13/2021    Large Joint Arthrocentesis: L knee  Date/Time: 10/13/2021 2:56 PM  Consent given by: patient  Site marked: site marked  Timeout: Immediately prior to procedure a time out " was called to verify the correct patient, procedure, equipment, support staff and site/side marked as required   Supporting Documentation  Indications: pain and joint swelling   Procedure Details  Location: knee - L knee  Preparation: Patient was prepped and draped in the usual sterile fashion  Needle size: 25 G  Approach: anterolateral  Medications administered: 2 mL lidocaine PF 1% 1 %; 80 mg methylPREDNISolone acetate 80 MG/ML  Patient tolerance: patient tolerated the procedure well with no immediate complications    Large Joint Arthrocentesis: R knee  Date/Time: 10/13/2021 2:56 PM  Consent given by: patient  Site marked: site marked  Timeout: Immediately prior to procedure a time out was called to verify the correct patient, procedure, equipment, support staff and site/side marked as required   Supporting Documentation  Indications: pain and joint swelling   Procedure Details  Location: knee - R knee  Preparation: Patient was prepped and draped in the usual sterile fashion  Needle size: 25 G (21 G)  Approach: anterolateral  Medications administered: 80 mg methylPREDNISolone acetate 80 MG/ML; 2 mL lidocaine PF 1% 1 %  Patient tolerance: patient tolerated the procedure well with no immediate complications

## 2021-10-15 ENCOUNTER — DOCUMENTATION (OUTPATIENT)
Dept: CARDIOLOGY | Facility: CLINIC | Age: 66
End: 2021-10-15

## 2021-10-15 RX ORDER — METHYLPREDNISOLONE ACETATE 80 MG/ML
80 INJECTION, SUSPENSION INTRA-ARTICULAR; INTRALESIONAL; INTRAMUSCULAR; SOFT TISSUE
Status: COMPLETED | OUTPATIENT
Start: 2021-10-13 | End: 2021-10-13

## 2021-10-15 RX ORDER — LIDOCAINE HYDROCHLORIDE 10 MG/ML
2 INJECTION, SOLUTION EPIDURAL; INFILTRATION; INTRACAUDAL; PERINEURAL
Status: COMPLETED | OUTPATIENT
Start: 2021-10-13 | End: 2021-10-13

## 2021-10-15 NOTE — PROGRESS NOTES
To whom it may concern,    I care for Mr. Meng Pabon my cardiology office in [facility].  He is a pleasant 66-year-old male with a medical history of coronary artery disease with prior PCI.  He has no angina.  He has a recent stress test with no evidence of ischemia.  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

## 2022-01-18 ENCOUNTER — TELEPHONE (OUTPATIENT)
Dept: CARDIOLOGY | Facility: CLINIC | Age: 67
End: 2022-01-18

## 2022-01-18 NOTE — TELEPHONE ENCOUNTER
Pt called. He wanting to know if he can be put on another medication than prasugrel.  He said that with the new insurance year it is now costing him $300/month for his copay since he has a prescription deductable.  Please advise.    Thanks,  Karli

## 2022-01-19 RX ORDER — CLOPIDOGREL BISULFATE 75 MG/1
75 TABLET ORAL DAILY
Qty: 90 TABLET | Refills: 3 | Status: SHIPPED | OUTPATIENT
Start: 2022-01-19 | End: 2022-08-16

## 2022-01-26 ENCOUNTER — HOSPITAL ENCOUNTER (OUTPATIENT)
Dept: CT IMAGING | Facility: HOSPITAL | Age: 67
Discharge: HOME | End: 2022-01-26
Admitting: THORACIC SURGERY (CARDIOTHORACIC VASCULAR SURGERY)

## 2022-01-26 DIAGNOSIS — R59.0 MEDIASTINAL LYMPHADENOPATHY: ICD-10-CM

## 2022-01-26 DIAGNOSIS — R91.8 LUNG NODULES: ICD-10-CM

## 2022-01-26 PROCEDURE — 71250 CT THORAX DX C-: CPT

## 2022-01-31 ENCOUNTER — OFFICE VISIT (OUTPATIENT)
Dept: SURGERY | Facility: CLINIC | Age: 67
End: 2022-01-31

## 2022-01-31 VITALS
HEIGHT: 71 IN | DIASTOLIC BLOOD PRESSURE: 82 MMHG | OXYGEN SATURATION: 98 % | BODY MASS INDEX: 30.94 KG/M2 | WEIGHT: 221 LBS | SYSTOLIC BLOOD PRESSURE: 126 MMHG | HEART RATE: 60 BPM

## 2022-01-31 DIAGNOSIS — R91.1 LUNG NODULE: ICD-10-CM

## 2022-01-31 DIAGNOSIS — R59.0 MEDIASTINAL LYMPHADENOPATHY: Primary | ICD-10-CM

## 2022-01-31 PROCEDURE — 99213 OFFICE O/P EST LOW 20 MIN: CPT | Performed by: NURSE PRACTITIONER

## 2022-01-31 NOTE — PROGRESS NOTES
"Chief Complaint  Follow up, lung nodule      Subjective          Meng Pabon presents to [facility] THORACIC SURGERY in follow-up      History of Present Illness    Mr. Pabon is a pleasant 66-year-old gentleman who presents today in follow-up for continued surveillance of a right upper lobe pulmonary nodule first found on imaging in May 2021.  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  He remains very active and is able to complete his daily activities without difficulty.  He denies shortness of breath or increased cough.  He has no new complaints today.      Objective   Vital Signs:   /82 (BP Location: Right arm, Patient Position: Sitting, Cuff Size: Adult)   Pulse 60   Ht 180.3 cm (71\")   Wt 100 kg (221 lb)   SpO2 98%   BMI 30.82 kg/m²     Physical Exam  Constitutional:       Appearance: Normal appearance.   HENT:      Head: Normocephalic and atraumatic.      Nose: Nose normal.   Cardiovascular:      Pulses: Normal pulses.   Pulmonary:      Effort: Pulmonary effort is normal.      Breath sounds: Normal breath sounds.   Musculoskeletal:         General: Normal range of motion.      Cervical back: Normal range of motion.   Skin:     General: Skin is warm.   Neurological:      General: No focal deficit present.      Mental Status: He is alert and oriented to person, place, and time.   Psychiatric:         Mood and Affect: Mood normal.         Behavior: Behavior normal.         Thought Content: Thought content normal.            Result Review :                  I have independently reviewed the CT of the chest performed on 1/26/2022 which demonstrates persistent mediastinal adenopathy, unchanged from previous exam.  There is a stable centrally calcified 1.2 cm nodule within the right upper lobe.  There are several micronodules scattered bilaterally without change.  No new or enlarging pulmonary nodules are identified.  No pleural or " pericardial effusion.    Assessment and Plan    Diagnoses and all orders for this visit:    1. Mediastinal lymphadenopathy (Primary)  -     CT Chest Without Contrast; Future    2. Lung nodule  -     CT Chest Without Contrast; Future      Mr. Pabon presents today for continued surveillance of a right upper lobe pulmonary nodule.  His most recent CT of the chest is stable without any evidence of new or enlarging nodules.  There is a 1.2 cm nodule in the right upper lobe which is unchanged from previous exam.  Given the size, we will continue with his pulmonary surveillance in 6-month intervals to confirm benign etiology.  A CT of the chest has been ordered 6 months from now followed by an office visit to review the findings.      I spent 28 minutes caring for Meng on this date of service. This time includes time spent by me in the following activities:preparing for the visit, reviewing tests, obtaining and/or reviewing a separately obtained history, performing a medically appropriate examination and/or evaluation , ordering medications, tests, or procedures, documenting information in the medical record and independently interpreting results and communicating that information with the patient/family/caregiver     Follow Up   Return in about 6 months (around 7/31/2022) for Next scheduled follow up.  Patient was given instructions and counseling regarding his condition or for health maintenance advice. Please see specific information pulled into the AVS if appropriate.

## 2022-02-08 RX ORDER — LISINOPRIL 20 MG/1
TABLET ORAL
Qty: 30 TABLET | Refills: 5 | Status: SHIPPED | OUTPATIENT
Start: 2022-02-08 | End: 2022-08-09 | Stop reason: SDUPTHER

## 2022-02-24 ENCOUNTER — OFFICE VISIT (OUTPATIENT)
Dept: ORTHOPEDIC SURGERY | Facility: CLINIC | Age: 67
End: 2022-02-24

## 2022-02-24 VITALS — WEIGHT: 220 LBS | BODY MASS INDEX: 30.8 KG/M2 | HEIGHT: 71 IN | TEMPERATURE: 98.4 F

## 2022-02-24 DIAGNOSIS — M25.562 PAIN IN BOTH KNEES, UNSPECIFIED CHRONICITY: Primary | ICD-10-CM

## 2022-02-24 DIAGNOSIS — M25.561 PAIN IN BOTH KNEES, UNSPECIFIED CHRONICITY: Primary | ICD-10-CM

## 2022-02-24 DIAGNOSIS — M17.0 BILATERAL PRIMARY OSTEOARTHRITIS OF KNEE: ICD-10-CM

## 2022-02-24 PROCEDURE — 20610 DRAIN/INJ JOINT/BURSA W/O US: CPT | Performed by: NURSE PRACTITIONER

## 2022-02-24 PROCEDURE — 99213 OFFICE O/P EST LOW 20 MIN: CPT | Performed by: NURSE PRACTITIONER

## 2022-02-24 PROCEDURE — 73562 X-RAY EXAM OF KNEE 3: CPT | Performed by: NURSE PRACTITIONER

## 2022-02-24 RX ORDER — METHYLPREDNISOLONE ACETATE 80 MG/ML
80 INJECTION, SUSPENSION INTRA-ARTICULAR; INTRALESIONAL; INTRAMUSCULAR; SOFT TISSUE
Status: COMPLETED | OUTPATIENT
Start: 2022-02-24 | End: 2022-02-24

## 2022-02-24 RX ORDER — IBUPROFEN 200 MG
200 TABLET ORAL AS NEEDED
COMMUNITY

## 2022-02-24 RX ADMIN — METHYLPREDNISOLONE ACETATE 80 MG: 80 INJECTION, SUSPENSION INTRA-ARTICULAR; INTRALESIONAL; INTRAMUSCULAR; SOFT TISSUE at 13:25

## 2022-02-24 RX ADMIN — METHYLPREDNISOLONE ACETATE 80 MG: 80 INJECTION, SUSPENSION INTRA-ARTICULAR; INTRALESIONAL; INTRAMUSCULAR; SOFT TISSUE at 13:24

## 2022-02-24 NOTE — PROGRESS NOTES
Patient Name: Meng Pabon   YOB: 1955  Referring Primary Care Physician: Carlos Riley APRN  BMI: Body mass index is 30.68 kg/m².    Chief Complaint:    Chief Complaint   Patient presents with   • Left Knee - Pain   • Right Knee - Pain        HPI: Fell from the deck of a pool 1 week ago about 5 feet and hit both knees and they have been hurting. He [place] with [facility] [place]. Leaving for [place] next week. Pt owns swimming pool company.    Meng Pabon is a 66 y.o. male who presents today for evaluation of   Chief Complaint   Patient presents with   • Left Knee - Pain   • Right Knee - Pain         Subjective   Medications:   Home Medications:  Current Outpatient Medications on File Prior to Visit   Medication Sig   • aspirin (aspirin) 81 MG EC tablet Take 1 tablet by mouth Daily.   • atenolol (TENORMIN) 50 MG tablet TAKE 1 AND 1/2 TABLETS BY MOUTH DAILY   • atorvastatin (LIPITOR) 40 MG tablet TAKE 1 TABLET BY MOUTH DAILY   • clopidogrel (PLAVIX) 75 MG tablet Take 1 tablet by mouth Daily.   • ibuprofen (ADVIL,MOTRIN) 200 MG tablet Take 200 mg by mouth As Needed for Mild Pain .   • isosorbide mononitrate (IMDUR) 30 MG 24 hr tablet TAKE 1 TABLET BY MOUTH DAILY   • lisinopril (PRINIVIL,ZESTRIL) 20 MG tablet TAKE 1 TABLET BY MOUTH DAILY   • nitroglycerin (NITROSTAT) 0.4 MG SL tablet Place 1 tablet under the tongue Every 5 (Five) Minutes As Needed for Chest Pain (Only if SBP Greater Than 100). Take no more than 3 doses in 15 minutes.     No current facility-administered medications on file prior to visit.     Current Medications:  Scheduled Meds:  Continuous Infusions:No current facility-administered medications for this visit.    PRN Meds:.    I have reviewed the patient's medical history in detail and updated the computerized patient record.  Review and summarization of old records includes:    Past Medical History:   Diagnosis Date   • Atypical chest pain    • Basal cell carcinoma     15 years prior from 2021    • CAD (coronary artery disease)    • Chronic total occlusion of coronary artery    • GERD (gastroesophageal reflux disease)    • Hyperlipidemia    • Hypertension    • Lung nodule         Past Surgical History:   Procedure Laterality Date   • CARDIAC CATHETERIZATION     • CARDIAC CATHETERIZATION N/A 8/3/2021    Procedure: Coronary angiography;  Surgeon: Han Nicholas MD;  Location:  [facility];  Service: Cardiology;  Laterality: N/A;   • CARDIAC CATHETERIZATION N/A 8/3/2021    Procedure: Left Heart Cath;  Surgeon: Han Nicholas MD;  Location: [facility] CATH INVASIVE LOCATION;  Service: Cardiology;  Laterality: N/A;   • CARDIAC CATHETERIZATION N/A 8/3/2021    Procedure: Left ventriculography;  Surgeon: Han Nicholas MD;  Location:  [facility] CATH INVAS[facility] LOCATION;  Service: Cardiology;  Laterality: N/A;   • CARDIAC CATHETERIZATION N/A 8/3/2021    Procedure: Stent MISSY coronary;  Surgeon: Han Nicholas MD;  Location: [facility] CATH INVASIVE LOCATION;  Service: Cardiology;  Laterality: N/A;   • INNER EAR SURGERY          Social History     Occupational History   • Not on file   Tobacco Use   • Smoking status: Never Smoker   • Smokeless tobacco: Never Used   Vaping Use   • Vaping Use: Never used   Substance and Sexual Activity   • Alcohol use: Yes     Alcohol/week: 2.0 standard drinks     Types: 2 Cans of beer per week     Comment: nightly, caffeine - tea   • Drug use: Never   • Sexual activity: Yes     Partners: Female     Birth control/protection: Post-menopausal      Social History     Social History Narrative   • Not on file        Family History   Problem Relation Age of Onset   • Hypertension Father    • Skin cancer Father    • Heart disease Father    • Heart disease Sister    • No Known Problems Mother    • Pancreatic cancer Brother        ROS: 14 point review of systems was performed and all other systems were reviewed and are negative except for documented findings  "in HPI and today's encounter.     Allergies: No Known Allergies  Constitutional:  Denies fever, shaking or chills   Eyes:  Denies change in visual acuity   HENT:  Denies nasal congestion or sore throat   Respiratory:  Denies cough or shortness of breath   Cardiovascular:  Denies chest pain or severe LE edema   GI:  Denies abdominal pain, nausea, vomiting, bloody stools or diarrhea   Musculoskeletal:  Numbness, tingling, pain, or loss of motor function only as noted above in history of present illness.  : Denies painful urination or hematuria  Integument:  Denies rash, lesion or ulceration   Neurologic:  Denies headache or focal weakness  Endocrine:  Denies lymphadenopathy  Psych:  Denies confusion or change in mental status   Hem:  Denies active bleeding    OBJECTIVE:  Physical Exam: 66 y.o. male  Wt Readings from Last 3 Encounters:   02/24/22 99.8 kg (220 lb)   01/31/22 100 kg (221 lb)   10/13/21 101 kg (222 lb)     Ht Readings from Last 1 Encounters:   02/24/22 180.3 cm (71\")     Body mass index is 30.68 kg/m².  Vitals:    02/24/22 1302   Temp: 98.4 °F (36.9 °C)     Vital signs reviewed.     General Appearance:    Alert, cooperative, in no acute distress                  Eyes: conjunctiva clear  ENT: external ears and nose atraumatic  CV: no peripheral edema  Resp: normal respiratory effort  Skin: no rashes or wounds; normal turgor  Psych: mood and affect appropriate  Lymph: no nodes appreciated  Neuro: gross sensation intact  Vascular:  Palpable peripheral pulse in noted extremity  Musculoskeletal Extremities: Skin is warm dry intact with good pulses movement and sensation extensor mechanisms are intact to left knee right knee calves are soft and nontender he has medial joint line tenderness with effusion.  Right patella is stable that slight offset there is palpable osteophytes no pain patient has no pain in the patellar tendon and can flex and extend without difficulty    Radiology:   Both knees 3 views done " for pain with comparison views show right patella with lateral pole osteophyte  Tricompartmental djd    Large Joint Arthrocentesis: L knee  Date/Time: 2/24/2022 1:24 PM  Consent given by: patient  Site marked: site marked  Timeout: Immediately prior to procedure a time out was called to verify the correct patient, procedure, equipment, support staff and site/side marked as required   Supporting Documentation  Indications: pain and joint swelling   Procedure Details  Location: knee - L knee  Preparation: Patient was prepped and draped in the usual sterile fashion  Needle gauge: 21 G.  Approach: anterolateral  Medications administered: 2 mL lidocaine (cardiac); 80 mg methylPREDNISolone acetate 80 MG/ML  Patient tolerance: patient tolerated the procedure well with no immediate complications    Large Joint Arthrocentesis: R knee  Date/Time: 2/24/2022 1:25 PM  Consent given by: patient  Site marked: site marked  Timeout: Immediately prior to procedure a time out was called to verify the correct patient, procedure, equipment, support staff and site/side marked as required   Supporting Documentation  Indications: pain and joint swelling   Procedure Details  Location: knee - R knee  Preparation: Patient was prepped and draped in the usual sterile fashion  Needle gauge: 21 G.  Approach: anterolateral  Medications administered: 2 mL lidocaine (cardiac); 80 mg methylPREDNISolone acetate 80 MG/ML  Patient tolerance: patient tolerated the procedure well with no immediate complications          Assessment:     ICD-10-CM ICD-9-CM   1. Pain in both knees, unspecified chronicity  M25.561 719.46    M25.562    2. Bilateral primary osteoarthritis of knee  M17.0 715.16        MDM/Plan:   The diagnosis(es), natural history, pathophysiology and treatment for diagnosis(es) were discussed. Opportunity given and questions answered.  Biomechanics of pertinent body areas discussed.  When appropriate, the use of ambulatory aids  discussed.    The diagnosis(es), natural history, pathophysiology and treatment for diagnosis(es) were discussed. Opportunity given and questions answered.  Biomechanics of pertinent body areas discussed.  When appropriate, the use of ambulatory aids discussed.  EXERCISES:  Advice on benefits of, and types of regular/moderate exercise pertaining to orthopedic diagnosis(es).  MEDICATIONS:  The risks, benefits, warnings,side effects and alternatives of medications discussed.  Inflammation/pain control; with cold, heat, elevation and/or liniments discussed as appropriate  MEDICAL RECORDS reviewed from other provider(s) for past and current medical history pertinent to this complaint.      2/24/2022    Dictated utilizing Dragon dictation

## 2022-03-01 RX ORDER — ATORVASTATIN CALCIUM 40 MG/1
TABLET, FILM COATED ORAL
Qty: 90 TABLET | Refills: 2 | Status: SHIPPED | OUTPATIENT
Start: 2022-03-01 | End: 2022-06-09 | Stop reason: SDUPTHER

## 2022-03-01 RX ORDER — ISOSORBIDE MONONITRATE 30 MG/1
TABLET, EXTENDED RELEASE ORAL
Qty: 90 TABLET | Refills: 4 | Status: SHIPPED | OUTPATIENT
Start: 2022-03-01 | End: 2022-06-09 | Stop reason: SDUPTHER

## 2022-04-04 VITALS
RESPIRATION RATE: 16 BRPM | HEART RATE: 83 BPM | SYSTOLIC BLOOD PRESSURE: 117 MMHG | TEMPERATURE: 98 F | DIASTOLIC BLOOD PRESSURE: 71 MMHG | DIASTOLIC BLOOD PRESSURE: 86 MMHG | RESPIRATION RATE: 28 BRPM | DIASTOLIC BLOOD PRESSURE: 73 MMHG | HEIGHT: 71 IN | SYSTOLIC BLOOD PRESSURE: 106 MMHG | DIASTOLIC BLOOD PRESSURE: 66 MMHG | HEART RATE: 84 BPM | WEIGHT: 218 LBS | SYSTOLIC BLOOD PRESSURE: 160 MMHG | RESPIRATION RATE: 26 BRPM | SYSTOLIC BLOOD PRESSURE: 119 MMHG | DIASTOLIC BLOOD PRESSURE: 81 MMHG | DIASTOLIC BLOOD PRESSURE: 93 MMHG | RESPIRATION RATE: 23 BRPM | RESPIRATION RATE: 24 BRPM | OXYGEN SATURATION: 94 % | RESPIRATION RATE: 21 BRPM | OXYGEN SATURATION: 96 % | SYSTOLIC BLOOD PRESSURE: 110 MMHG | DIASTOLIC BLOOD PRESSURE: 74 MMHG | HEART RATE: 81 BPM | OXYGEN SATURATION: 91 % | SYSTOLIC BLOOD PRESSURE: 112 MMHG | HEART RATE: 89 BPM | DIASTOLIC BLOOD PRESSURE: 75 MMHG | SYSTOLIC BLOOD PRESSURE: 148 MMHG | TEMPERATURE: 97.8 F | HEART RATE: 73 BPM | HEART RATE: 86 BPM

## 2022-04-04 DIAGNOSIS — I10 ESSENTIAL HYPERTENSION: ICD-10-CM

## 2022-04-04 DIAGNOSIS — E78.2 MIXED HYPERLIPIDEMIA: ICD-10-CM

## 2022-04-04 DIAGNOSIS — Z00.00 HEALTHCARE MAINTENANCE: Primary | ICD-10-CM

## 2022-04-07 ENCOUNTER — AMBULATORY SURGICAL CENTER (AMBULATORY)
Dept: URBAN - METROPOLITAN AREA SURGERY 17 | Facility: SURGERY | Age: 67
End: 2022-04-07
Payer: COMMERCIAL

## 2022-04-07 DIAGNOSIS — Z86.010 PERSONAL HISTORY OF COLONIC POLYPS: ICD-10-CM

## 2022-04-07 PROCEDURE — 45378 DIAGNOSTIC COLONOSCOPY: CPT | Mod: 33 | Performed by: INTERNAL MEDICINE

## 2022-04-11 ENCOUNTER — LAB (OUTPATIENT)
Dept: LAB | Facility: HOSPITAL | Age: 67
End: 2022-04-11

## 2022-04-11 PROCEDURE — 80050 GENERAL HEALTH PANEL: CPT | Performed by: NURSE PRACTITIONER

## 2022-04-11 PROCEDURE — 83036 HEMOGLOBIN GLYCOSYLATED A1C: CPT | Performed by: NURSE PRACTITIONER

## 2022-04-11 PROCEDURE — 84153 ASSAY OF PSA TOTAL: CPT | Performed by: NURSE PRACTITIONER

## 2022-04-11 PROCEDURE — 81003 URINALYSIS AUTO W/O SCOPE: CPT | Performed by: NURSE PRACTITIONER

## 2022-04-11 PROCEDURE — 84439 ASSAY OF FREE THYROXINE: CPT | Performed by: NURSE PRACTITIONER

## 2022-04-11 PROCEDURE — 80061 LIPID PANEL: CPT | Performed by: NURSE PRACTITIONER

## 2022-04-15 ENCOUNTER — OFFICE VISIT (OUTPATIENT)
Dept: INTERNAL MEDICINE | Age: 67
End: 2022-04-15

## 2022-04-15 VITALS
HEIGHT: 71 IN | OXYGEN SATURATION: 98 % | TEMPERATURE: 97.3 F | HEART RATE: 57 BPM | SYSTOLIC BLOOD PRESSURE: 124 MMHG | BODY MASS INDEX: 31.81 KG/M2 | DIASTOLIC BLOOD PRESSURE: 76 MMHG | WEIGHT: 227.2 LBS

## 2022-04-15 DIAGNOSIS — E66.9 OBESITY (BMI 30-39.9): ICD-10-CM

## 2022-04-15 DIAGNOSIS — E78.2 MIXED HYPERLIPIDEMIA: ICD-10-CM

## 2022-04-15 DIAGNOSIS — I10 ESSENTIAL HYPERTENSION: ICD-10-CM

## 2022-04-15 DIAGNOSIS — Z00.00 ENCOUNTER FOR ANNUAL PHYSICAL EXAM: Primary | ICD-10-CM

## 2022-04-15 PROCEDURE — 90471 IMMUNIZATION ADMIN: CPT | Performed by: NURSE PRACTITIONER

## 2022-04-15 PROCEDURE — 99396 PREV VISIT EST AGE 40-64: CPT | Performed by: NURSE PRACTITIONER

## 2022-04-15 PROCEDURE — 90750 HZV VACC RECOMBINANT IM: CPT | Performed by: NURSE PRACTITIONER

## 2022-04-15 NOTE — PROGRESS NOTES
"[facility] [place] INTERNAL MEDICINE  SARIKA Harmon / 66 y.o. / male  04/15/2022                        VITALS     Visit Vitals  /76 (Cuff Size: Adult)   Pulse 57   Temp 97.3 °F (36.3 °C) (Temporal)   Ht 180.3 cm (71\")   Wt 103 kg (227 lb 3.2 oz)   SpO2 98%   BMI 31.69 kg/m²       BP Readings from Last 3 Encounters:   04/15/22 124/76   01/31/22 126/82   09/20/21 136/80     Wt Readings from Last 3 Encounters:   04/15/22 103 kg (227 lb 3.2 oz)   02/24/22 99.8 kg (220 lb)   01/31/22 100 kg (221 lb)      Body mass index is 31.69 kg/m².    MEDICATIONS     Current Outpatient Medications   Medication Sig Dispense Refill   • aspirin (aspirin) 81 MG EC tablet Take 1 tablet by mouth Daily.     • atenolol (TENORMIN) 50 MG tablet TAKE 1 AND 1/2 TABLETS BY MOUTH DAILY 135 tablet 2   • atorvastatin (LIPITOR) 40 MG tablet TAKE 1 TABLET BY MOUTH DAILY 90 tablet 2   • clopidogrel (PLAVIX) 75 MG tablet Take 1 tablet by mouth Daily. 90 tablet 3   • ibuprofen (ADVIL,MOTRIN) 200 MG tablet Take 200 mg by mouth As Needed for Mild Pain .     • isosorbide mononitrate (IMDUR) 30 MG 24 hr tablet TAKE 1 TABLET BY MOUTH DAILY 90 tablet 4   • lisinopril (PRINIVIL,ZESTRIL) 20 MG tablet TAKE 1 TABLET BY MOUTH DAILY 30 tablet 5   • nitroglycerin (NITROSTAT) 0.4 MG SL tablet Place 1 tablet under the tongue Every 5 (Five) Minutes As Needed for Chest Pain (Only if SBP Greater Than 100). Take no more than 3 doses in 15 minutes. 30 tablet 6     No current facility-administered medications for this visit.       _____________________________________________________________________________________    CHIEF COMPLAINT     Annual Exam, Hypertension, and Hyperlipidemia      HISTORY OF PRESENT ILLNESS      Meng presents for annual health maintenance visit.    · Last health maintenance visit: approximately 1 year ago  · General health: good  · Lifestyle:  · Attempting to lose weight?: No   · Diet: eats decently  · Exercise: moderately active at " work/home  · Tobacco: Never used   · Alcohol: occasional/infrequent  · Work: Full-time  · Reproductive health:  · Sexually active?: Yes   · Concern for STD?: No   · Sexual problems?: erectile dysfunction   · Sees Urologist?: No   · Depression Screening:      PHQ-2/PHQ-9 Depression Screening 4/15/2022   Retired PHQ-9 Total Score -   Retired Total Score -   Little Interest or Pleasure in Doing Things 0-->not at all   Feeling Down, Depressed or Hopeless 0-->not at all   PHQ-9: Brief Depression Severity Measure Score 0         PHQ-2: 0 (Not depressed)     PHQ-9: 0 (Negative screening for depression)    Patient Care Team:  Carlos Riley APRN as PCP - General (Internal Medicine)  Han Nicholas MD as Consulting Physician (Cardiology)  Rosibel Ha APRN as Nurse Practitioner (Nurse Practitioner)  Giovany Mijares MD as Consulting Physician (Orthopedic Surgery)  eBrta Avelar DNP, APRN as Nurse Practitioner (Thoracic Surgery)  ______________________________________________________________________    ALLERGIES  No Known Allergies     PFSH:     The following portions of the patient's history were reviewed and updated as appropriate: Allergies / Current Medications / Past Medical History / Surgical History / Social History / Family History    PROBLEM LIST   Patient Active Problem List   Diagnosis   • Coronary artery disease involving native coronary artery of native heart without angina pectoris   • Chronic total occlusion of coronary artery   • Mixed hyperlipidemia   • Essential hypertension       PAST MEDICAL HISTORY  Past Medical History:   Diagnosis Date   • Allergic    • Atypical chest pain    • Basal cell carcinoma     15 years prior from 2021   • CAD (coronary artery disease)    • Chronic total occlusion of coronary artery    • Colon polyp    • Erectile dysfunction    • GERD (gastroesophageal reflux disease)    • Hyperlipidemia    • Hypertension    • Lung nodule        SURGICAL HISTORY  Past Surgical  History:   Procedure Laterality Date   • CARDIAC CATHETERIZATION     • CARDIAC CATHETERIZATION N/A 08/03/2021    Procedure: Coronary angiography;  Surgeon: Han Nicholas MD;  Location:  [facility] CATH INVAS[facility] LOCATION;  Service: Cardiology;  Laterality: N/A;   • CARDIAC CATHETERIZATION N/A 08/03/2021    Procedure: Left Heart Cath;  Surgeon: Han Nicholas MD;  Location:  [facility] CATH INVAS[facility] LOCATION;  Service: Cardiology;  Laterality: N/A;   • CARDIAC CATHETERIZATION N/A 08/03/2021    Procedure: Left ventriculography;  Surgeon: Han Nicholas MD;  Location:  [facility] CATH INVAS[facility] LOCATION;  Service: Cardiology;  Laterality: N/A;   • CARDIAC CATHETERIZATION N/A 08/03/2021    Procedure: Stent MISSY coronary;  Surgeon: Han Nicholas MD;  Location: [facility] CATH INVASIVE LOCATION;  Service: Cardiology;  Laterality: N/A;   • COLONOSCOPY  April 7 2022   • INNER EAR SURGERY         SOCIAL HISTORY  Social History     Socioeconomic History   • Marital status:    Tobacco Use   • Smoking status: Never Smoker   • Smokeless tobacco: Never Used   Vaping Use   • Vaping Use: Never used   Substance and Sexual Activity   • Alcohol use: Yes     Alcohol/week: 2.0 standard drinks     Types: 2 Cans of beer per week     Comment: nightly, caffeine - tea   • Drug use: Never   • Sexual activity: Yes     Partners: Female     Birth control/protection: Post-menopausal       FAMILY HISTORY  Family History   Problem Relation Age of Onset   • Hypertension Father    • Skin cancer Father    • Heart disease Father    • Heart disease Sister    • No Known Problems Mother    • Pancreatic cancer Brother        IMMUNIZATION HISTORY  Immunization History   Administered Date(s) Administered   • COVID-19 (PFIZER) PURPLE CAP 03/26/2021, 04/16/2021   • Shingrix 04/15/2022       ______________________________________________________________________    REVIEW OF SYSTEMS     Review of Systems   Constitutional: Negative.    HENT:  Negative.    Eyes: Negative.    Respiratory: Negative.    Cardiovascular: Negative.    Gastrointestinal: Negative.    Endocrine: Negative.    Genitourinary: Negative.    Musculoskeletal: Negative.    Skin: Negative.    Allergic/Immunologic: Negative.    Neurological: Negative.    Hematological: Negative.    Psychiatric/Behavioral: Negative.      PHYSICAL EXAMINATION     Physical Exam  Constitutional:       Appearance: Normal appearance. He is obese.   HENT:      Head: Normocephalic and atraumatic.      Right Ear: Tympanic membrane normal.      Left Ear: Tympanic membrane normal.      Nose: Nose normal.      Mouth/Throat:      Mouth: Mucous membranes are moist.      Pharynx: Oropharynx is clear.   Eyes:      Conjunctiva/sclera: Conjunctivae normal.      Pupils: Pupils are equal, round, and reactive to light.   Neck:      Vascular: No carotid bruit.   Cardiovascular:      Rate and Rhythm: Normal rate and regular rhythm.      Pulses: Normal pulses.      Heart sounds: Normal heart sounds.   Pulmonary:      Effort: Pulmonary effort is normal.      Breath sounds: Normal breath sounds.   Abdominal:      General: There is no distension.      Palpations: Abdomen is soft.      Tenderness: There is no abdominal tenderness. There is no right CVA tenderness or left CVA tenderness.   Genitourinary:     Comments: Declines LUDMILA, normal PSA, nocturia x2 nightly, no family history of prostate cancer in  Musculoskeletal:         General: Normal range of motion.      Cervical back: Normal range of motion.      Right lower leg: No edema.      Left lower leg: No edema.   Lymphadenopathy:      Cervical: No cervical adenopathy.   Skin:     General: Skin is warm and dry.   Neurological:      Mental Status: He is alert and oriented to person, place, and time.      Cranial Nerves: No cranial nerve deficit.      Motor: No weakness.      Gait: Gait normal.   Psychiatric:         Thought Content: Thought content normal.         Judgment: Judgment  normal.        REVIEWED DATA      Labs:    Lab Results   Component Value Date     04/11/2022    K 4.5 04/11/2022    CALCIUM 8.8 04/11/2022    AST 13 04/11/2022    ALT 19 04/11/2022    BUN 17 04/11/2022    CREATININE 0.93 04/11/2022    CREATININE 0.83 08/04/2021    CREATININE 0.86 08/02/2021    EGFRIFNONA 93 08/04/2021    EGFRIFAFRI 86 04/13/2021       Lab Results   Component Value Date    HGBA1C 5.70 (H) 04/11/2022    HGBA1C 5.80 (H) 08/04/2021    TSH 3.810 04/11/2022    FREET4 1.20 04/11/2022       Lab Results   Component Value Date    PSA 0.482 04/11/2022       Lab Results   Component Value Date    LDL 96 04/11/2022    HDL 40 04/11/2022    TRIG 64 04/11/2022    CHOLHDLRATIO 3.73 04/11/2022       No components found for: [id number]    Lab Results   Component Value Date    WBC 5.55 04/11/2022    HGB 14.8 04/11/2022    MCV 83.9 04/11/2022     04/11/2022       Lab Results   Component Value Date    GLUCOSEU Negative 04/11/2022    BLOODU Negative 04/11/2022    NITRITEU Negative 04/11/2022    LEUKOCYTESUR Negative 04/11/2022          Lab Results   Component Value Date    HEPCVIRUSABY <0.1 04/13/2021       Imaging:           Medical Tests:       ______________________________________________________________________    ASSESSMENT & PLAN     ANNUAL WELLNESS EXAM / PHYSICAL     Other medical problems addressed today:    Cardiology: Followed by Dr. Nicholas. Cardiac cath in the past with medication treatment today with aspirin 325mg, imdur 30mg daily, atenolol 50mg BID, and atorvastatin 40mg nightly. Hyperlipidemia well controlled, along with HTN on lisinopril 20mg daily.     Hypertension well-controlled with atenolol and lisinopril 20 mg daily. Denies any chest pain, shortness of air, headache, visual disturbances, lower extremity leg swelling, or heart palpitations.     Hyperlipidemia: Well-controlled with atorvastatin 40 mg daily.  Myalgias, normal liver enzymes.     Never a smoker, no vaping, no substance abuse  and approx two beers nightly.     Up-to-date on colonoscopy as of April 2022, history of polyps, performing every 5 years.    PSA last 0.482.  Nocturia x2 nightly.    Summary/Discussion:     · Follows cardiology routinely, follow-up in 1 year for Medicare wellness welcome to Medicare.  · Continue current regimen for hyperlipidemia, hypertension  · Decrease/eliminate soda, caffeine, alcohol and overall caloric intake. Reduce carbohydrates and sweets in diet.  Continue to improve dietary habits with lean proteins, fresh vegetables, fruits, and nuts. Improve aerobic exercise: walking/biking/swimming daily as tolerated, recommend 30 minutes/day at least 5 days/week.    Next Appointment with me: Visit date not found    Return in about 1 year (around 4/15/2023) for Medicare Wellness welcome to .      HEALTHCARE MAINTENANCE ISSUES       Cancer Screening:  · Colon: Initial/Next screening at age: CURRENT  · Repeat colon cancer screening: every 5 years  · Prostate: PSA checked annually but defer LUDMILA   · Testicular: Recommended monthly self exam  · Skin: Monthly self skin examination, annual exam by health professional  · Lung: Does not meet criteria for lung cancer screening.   · Other:    Screening Labs & Tests:  · Lab results reviewed & discussed with with patient or orders placed today.  · EKG:  · CV Screening: Lipid panel  · DEXA (75+ or risk factors):   · HEP C (If born [birth year]30-3141 or risk factors): Negative screen  · Other:     Immunization/Vaccinations (to be given today unless deferred by patient)  · Influenza: Patient missed the flu shot but plans to get it this season  · Hepatitis A: Recommended here or at pharmacy  · Tetanus/Pertussis: Recommended here or at pharmacy  · Pneumovax: Declined by patient  · Shingles: 1st Shingrix will be given today (2nd shot in 2-6 months)  · Other:     Lifestyle Counseling:  · Lifestyle Modifications: Improve dietary compliance and Increase intensity/regularity of aerobic  exercise  · Safety Issues: Always wear seatbelt, Avoid texting while driving   · Use sunscreen, regular skin examination  · Recommended annual dental/vision examination.  · Emotional/Stress/Sleep: Reviewed and  given when appropriate      Health Maintenance   Topic Date Due   • Pneumococcal Vaccine 0-64 (1 - PCV) Never done   • TDAP/TD VACCINES (1 - Tdap) Never done   • COVID-19 Vaccine (3 - Booster for Pfizer series) 04/17/2022 (Originally 9/16/2021)   • ZOSTER VACCINE (2 of 2) 06/10/2022   • INFLUENZA VACCINE  08/01/2022   • LIPID PANEL  04/11/2023   • ANNUAL PHYSICAL  04/16/2023   • COLORECTAL CANCER SCREENING  04/07/2025   • HEPATITIS C SCREENING  Completed         Examiner was wearing KN95 mask, face shield and exam gloves during the entire duration of the visit. Patient was masked the entire time.   Minimum social distance of 6 ft maintained entire visit except if physical contact was necessary as documented.     **Dragon Disclaimer:   Much of this encounter note is an electronic transcription/translation of spoken language to printed text. The electronic translation of spoken language may permit erroneous, or at times, nonsensical words or phrases to be inadvertently transcribed. Although I have reviewed the note for such errors, some may still exist.

## 2022-06-02 RX ORDER — ATENOLOL 50 MG/1
TABLET ORAL
Qty: 135 TABLET | Refills: 2 | Status: SHIPPED | OUTPATIENT
Start: 2022-06-02 | End: 2022-06-09 | Stop reason: SDUPTHER

## 2022-06-09 ENCOUNTER — TELEPHONE (OUTPATIENT)
Dept: CARDIOLOGY | Facility: CLINIC | Age: 67
End: 2022-06-09

## 2022-06-09 RX ORDER — ATENOLOL 50 MG/1
75 TABLET ORAL DAILY
Qty: 135 TABLET | Refills: 1 | Status: SHIPPED | OUTPATIENT
Start: 2022-06-09 | End: 2023-01-23

## 2022-06-09 RX ORDER — ATORVASTATIN CALCIUM 40 MG/1
40 TABLET, FILM COATED ORAL DAILY
Qty: 90 TABLET | Refills: 1 | Status: SHIPPED | OUTPATIENT
Start: 2022-06-09 | End: 2022-08-16 | Stop reason: SDUPTHER

## 2022-06-09 RX ORDER — ISOSORBIDE MONONITRATE 30 MG/1
30 TABLET, EXTENDED RELEASE ORAL DAILY
Qty: 90 TABLET | Refills: 1 | Status: SHIPPED | OUTPATIENT
Start: 2022-06-09 | End: 2023-01-23

## 2022-06-09 NOTE — TELEPHONE ENCOUNTER
Pt is due for a 1 year follow up. Can you call and set him up with either Dr. Nicholas or Kari/Olesya/Agustina when you get a chance.    Thanks,  Karli

## 2022-07-18 ENCOUNTER — HOSPITAL ENCOUNTER (OUTPATIENT)
Dept: CT IMAGING | Facility: HOSPITAL | Age: 67
Discharge: HOME | End: 2022-07-18
Admitting: NURSE PRACTITIONER

## 2022-07-18 ENCOUNTER — APPOINTMENT (OUTPATIENT)
Dept: CT IMAGING | Facility: HOSPITAL | Age: 67
End: 2022-07-18

## 2022-07-18 DIAGNOSIS — R91.1 LUNG NODULE: ICD-10-CM

## 2022-07-18 DIAGNOSIS — R59.0 MEDIASTINAL LYMPHADENOPATHY: ICD-10-CM

## 2022-07-18 PROCEDURE — 71250 CT THORAX DX C-: CPT

## 2022-07-25 ENCOUNTER — OFFICE VISIT (OUTPATIENT)
Dept: SURGERY | Facility: CLINIC | Age: 67
End: 2022-07-25

## 2022-07-25 DIAGNOSIS — R91.1 LUNG NODULE: Primary | ICD-10-CM

## 2022-07-25 PROCEDURE — 99442 PR PHYS/QHP TELEPHONE EVALUATION 11-20 MIN: CPT | Performed by: NURSE PRACTITIONER

## 2022-07-25 NOTE — PROGRESS NOTES
"Chief Complaint  Follow up, lung nodules    You have chosen to receive care through a telephone visit. Do you consent to use a telephone visit for your medical care today? Yes      Subjective        Meng Pabon presents to [facility] THORACIC SURGERY  History of Present Illness    Mr. Pabon is a pleasant 67 year old gentleman who presents today via telephone for continued surveillance of multiple pulmonary nodules.  He has been seen in our clinic since May 2021 for a 1.2cm nodule in the right upper lobe.  He presents today with follow-up CT chest.  He has no new complaints.      Objective   Vital Signs:  There were no vitals taken for this visit.  Estimated body mass index is 31.69 kg/m² as calculated from the following:    Height as of 4/15/22: 180.3 cm (71\").    Weight as of 4/15/22: 103 kg (227 lb 3.2 oz).          Physical Exam     Deferred secondary to telephone visit.      Result Review :                 I have independently reviewed the CT of the chest performed on 7/18/2022 which demonstrates stable 1.2 cm nodule in the right upper lobe and multiple calcified and noncalcified micronodules bilaterally.  No pleural or pericardial effusion.  No new or enlarging pulmonary nodules are seen.  No mediastinal or hilar lymphadenopathy.    Assessment and Plan   Diagnoses and all orders for this visit:    1. Lung nodule (Primary)  -     CT Chest Without Contrast; Future    Mr. Pabon is a pleasant 67-year-old gentleman who presents today with follow-up CT chest which is stable and without evidence of any new or enlarging pulmonary nodules.  We will order follow-up CT chest in 6 months for continued surveillance of this.  Mr. Pabon is advised to call and be seen sooner should the need arise.       I spent 18 minutes caring for Meng on this date of service. This time includes time spent by me in the following activities:preparing for the visit, obtaining and/or reviewing a separately obtained " history, documenting information in the medical record and independently interpreting results and communicating that information with the patient/family/caregiver       Follow Up   Return for Next scheduled follow up.  Patient was given instructions and counseling regarding his condition or for health maintenance advice. Please see specific information pulled into the AVS if appropriate.

## 2022-08-08 ENCOUNTER — OFFICE VISIT (OUTPATIENT)
Dept: CARDIOLOGY | Facility: CLINIC | Age: 67
End: 2022-08-08

## 2022-08-08 VITALS
HEART RATE: 67 BPM | BODY MASS INDEX: 31.69 KG/M2 | OXYGEN SATURATION: 98 % | DIASTOLIC BLOOD PRESSURE: 80 MMHG | HEIGHT: 71 IN | WEIGHT: 226.4 LBS | SYSTOLIC BLOOD PRESSURE: 140 MMHG

## 2022-08-08 DIAGNOSIS — I10 ESSENTIAL HYPERTENSION: ICD-10-CM

## 2022-08-08 DIAGNOSIS — I25.10 CORONARY ARTERY DISEASE INVOLVING NATIVE CORONARY ARTERY OF NATIVE HEART WITHOUT ANGINA PECTORIS: Primary | ICD-10-CM

## 2022-08-08 DIAGNOSIS — E78.2 MIXED HYPERLIPIDEMIA: ICD-10-CM

## 2022-08-08 PROCEDURE — 93000 ELECTROCARDIOGRAM COMPLETE: CPT | Performed by: NURSE PRACTITIONER

## 2022-08-08 PROCEDURE — 99214 OFFICE O/P EST MOD 30 MIN: CPT | Performed by: NURSE PRACTITIONER

## 2022-08-08 NOTE — TELEPHONE ENCOUNTER
----- Message from Han Nicholas MD sent at 7/17/2018  3:00 PM EDT -----  Please let him know that his labs look a little bit better.  No problems  
Called pt advising.    Karli  
Patent

## 2022-08-08 NOTE — PROGRESS NOTES
[facility] Follow Up Office Note     Encounter Date:22  Patient:Meng Pabon  :1955  MRN:[id number]      Chief Complaint:   Chief Complaint   Patient presents with   • Dizziness   • Shortness of Breath   • Follow-up         History of Presenting Illness:        Meng Pabon is a 67 y.o. male who is here for follow-up of CAD.  He is a patient of Dr Nicholas.    Patient has known coronary artery disease, hypertension, hyperlipidemia.    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  A nuclear stress test was ordered for further evaluation.  This revealed a small mildly severe area of ischemia at the apex and inferior wall with EKG changes during stress.  He ultimately underwent cardiac catheterization revealing 90% lesion of proximal to mid circumflex status post MISSY.  He also has known  of mid LAD with right to left collaterals being treated medically.    Patient was last seen in the office by Dr. Nicholas in 2021.  From a cardiac perspective he was doing well at that time.  No changes were made to his medications.    Lipid panel 2022 demonstrates LDL 96, HDL 40, total cholesterol 149.    Patient reports he has been doing well overall.  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  He denies chest tightness, dyspnea on exertion, palpitations, lower extremity edema or orthopnea.  EKG today without acute ischemic changes.    Review of Systems:  Review of Systems   Cardiovascular: Negative for chest pain, dyspnea on exertion, leg swelling, orthopnea and palpitations.   Respiratory: Negative for shortness of breath.        Current Outpatient Medications on File Prior to Visit   Medication Sig Dispense Refill   • aspirin (aspirin) 81 MG EC tablet Take 1 tablet by mouth Daily.     • atenolol (TENORMIN) 50 MG tablet Take 1.5 tablets by mouth Daily. 135 tablet 1   • atorvastatin (LIPITOR) 40 MG tablet Take 1 tablet  by mouth Daily. 90 tablet 1   • clopidogrel (PLAVIX) 75 MG tablet Take 1 tablet by mouth Daily. 90 tablet 3   • ibuprofen (ADVIL,MOTRIN) 200 MG tablet Take 200 mg by mouth As Needed for Mild Pain .     • isosorbide mononitrate (IMDUR) 30 MG 24 hr tablet Take 1 tablet by mouth Daily. 90 tablet 1   • lisinopril (PRINIVIL,ZESTRIL) 20 MG tablet TAKE 1 TABLET BY MOUTH DAILY 30 tablet 5   • nitroglycerin (NITROSTAT) 0.4 MG SL tablet Place 1 tablet under the tongue Every 5 (Five) Minutes As Needed for Chest Pain (Only if SBP Greater Than 100). Take no more than 3 doses in 15 minutes. 30 tablet 6     No current facility-administered medications on file prior to visit.       No Known Allergies    Past Medical History:   Diagnosis Date   • Allergic    • Atypical chest pain    • Basal cell carcinoma     15 years prior from 2021   • CAD (coronary artery disease)    • Chronic total occlusion of coronary artery    • Colon polyp    • Erectile dysfunction    • GERD (gastroesophageal reflux disease)    • Hyperlipidemia    • Hypertension    • Lung nodule        Past Surgical History:   Procedure Laterality Date   • CARDIAC CATHETERIZATION     • CARDIAC CATHETERIZATION N/A 08/03/2021    Procedure: Coronary angiography;  Surgeon: Han Nicholas MD;  Location: [facility] CATH INVASIVE LOCATION;  Service: Cardiology;  Laterality: N/A;   • CARDIAC CATHETERIZATION N/A 08/03/2021    Procedure: Left Heart Cath;  Surgeon: Han Nicholas MD;  Location: [facility] CATH INVASIVE LOCATION;  Service: Cardiology;  Laterality: N/A;   • CARDIAC CATHETERIZATION N/A 08/03/2021    Procedure: Left ventriculography;  Surgeon: Han Nicholas MD;  Location: [facility] CATH INVASIVE LOCATION;  Service: Cardiology;  Laterality: N/A;   • CARDIAC CATHETERIZATION N/A 08/03/2021    Procedure: Stent MISSY coronary;  Surgeon: Han Nicholas MD;  Location: [facility] CATH INVASIVE LOCATION;  Service: Cardiology;  Laterality: N/A;   • COLONOSCOPY  April  "7 2022   • INNER EAR SURGERY         Social History     Socioeconomic History   • Marital status:    Tobacco Use   • Smoking status: Never Smoker   • Smokeless tobacco: Never Used   Vaping Use   • Vaping Use: Never used   Substance and Sexual Activity   • Alcohol use: Yes     Alcohol/week: 2.0 standard drinks     Types: 2 Cans of beer per week     Comment: nightly, caffeine - tea   • Drug use: Never   • Sexual activity: Yes     Partners: Female     Birth control/protection: Post-menopausal       Family History   Problem Relation Age of Onset   • Hypertension Father    • Skin cancer Father    • Heart disease Father    • Heart disease Sister    • No Known Problems Mother    • Pancreatic cancer Brother        The following portions of the patient's history were reviewed and updated as appropriate: allergies, current medications, past family history, past medical history, past social history, past surgical history and problem list.       Objective:       Vitals:    08/08/22 1412   BP: 140/80   BP Location: Right arm   Patient Position: Sitting   Cuff Size: Adult   Pulse: 67   SpO2: 98%   Weight: 103 kg (226 lb 6.4 oz)   Height: 180.3 cm (71\")         Physical Exam:  Constitutional: Well appearing, well developed, no acute distress   HENT: Oropharynx clear and membrane moist  Eyes: Normal conjunctiva, no sclera icterus  Neck: Supple, no carotid bruit bilaterally  Cardiovascular: Regular rate and rhythm, No Murmur, No bilateral lower extremity edema  Pulmonary: Normal respiratory effort, normal lung sounds, no wheezing  Neurological: Alert and orient x 3  Skin: Warm, dry, no ecchymosis, no rash  Psych: Appropriate mood and affect. Normal judgment and insight         Lab Results   Component Value Date     04/11/2022     08/04/2021    K 4.5 04/11/2022    K 4.4 08/04/2021     (H) 04/11/2022     08/04/2021    CO2 24.9 04/11/2022    CO2 19.5 (L) 08/04/2021    BUN 17 04/11/2022    BUN 18 " 08/04/2021    CREATININE 0.93 04/11/2022    CREATININE 0.83 08/04/2021    EGFRIFNONA 93 08/04/2021    EGFRIFNONA 90 08/02/2021    EGFRIFAFRI 86 04/13/2021    EGFRIFAFRI  09/14/2016      Comment:      <15 Indicative of kidney failure.    GLUCOSE 100 (H) 04/11/2022    GLUCOSE 94 08/04/2021    CALCIUM 8.8 04/11/2022    CALCIUM 8.7 08/04/2021    PROTENTOTREF 7.0 04/13/2021    ALBUMIN 3.60 04/11/2022    ALBUMIN 4.30 04/13/2021    BILITOT 0.5 04/11/2022    BILITOT 0.6 04/13/2021    AST 13 04/11/2022    AST 19 04/13/2021    ALT 19 04/11/2022    ALT 18 04/13/2021     Lab Results   Component Value Date    WBC 5.55 04/11/2022    WBC 7.94 08/04/2021    HGB 14.8 04/11/2022    HGB 14.7 08/04/2021    HCT 42.3 04/11/2022    HCT 43.7 08/04/2021    MCV 83.9 04/11/2022    MCV 85.0 08/04/2021     04/11/2022     08/04/2021     Lab Results   Component Value Date    CHOL 149 04/11/2022    CHOL 143 08/04/2021    TRIG 64 04/11/2022    TRIG 78 08/04/2021    HDL 40 04/11/2022    HDL 43 08/04/2021    LDL 96 04/11/2022    LDL 85 08/04/2021     No results found for: PROBNP, BNP  Lab Results   Component Value Date    TROPONINT <0.010 08/03/2021     Lab Results   Component Value Date    TSH 3.810 04/11/2022           ECG 12 Lead    Date/Time: 8/8/2022 2:30 PM  Performed by: Kari Ureña APRN  Authorized by: Kari Ureña APRN   Comparison: compared with previous ECG from 9/20/2021  Similar to previous ECG  Rhythm: sinus rhythm  Other findings: non-specific ST-T wave changes               Assessment:          Diagnosis Plan   1. Coronary artery disease involving native coronary artery of native heart without angina pectoris  ECG 12 Lead   2. Mixed hyperlipidemia     3. Essential hypertension            Plan:       CAD - hx of PCI in 2021.  Patient without anginal complaints.  Continue aspirin, statin, beta-blocker, Imdur.  Patient is also on clopidogrel, will ask Dr. Nicholas if may stop at this time    Hypertension - BP  is controlled.  Continue current medications.  When he checks at home it is typically SBP 120s    Hyperlipidemia - reviewed lipid panel from May 2022.  LDL is elevated.  Will increase atorvastatin to 80 mg daily    Patient is seen today for follow-up and is doing well from a cardiac perspective.  He is not having anginal symptoms and clinically does not appear in acute heart failure.  I am increasing his statin for tighter control.  I will discuss stopping clopidogrel with Dr. Nicholas.  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  I will have him follow-up with Dr. Nicholas in 6 months or earlier with problems.    Orders Placed This Encounter   Procedures   • ECG 12 Lead     This order was created via procedure documentation     Order Specific Question:   Release to patient     Answer:   Immediate            SARIKA Sol  [facility]  08/08/22  15:00 EDT

## 2022-08-09 RX ORDER — LISINOPRIL 20 MG/1
20 TABLET ORAL DAILY
Qty: 90 TABLET | Refills: 1 | Status: SHIPPED | OUTPATIENT
Start: 2022-08-09 | End: 2023-01-23

## 2022-08-16 ENCOUNTER — DOCUMENTATION (OUTPATIENT)
Dept: CARDIOLOGY | Facility: CLINIC | Age: 67
End: 2022-08-16

## 2022-08-16 RX ORDER — ATORVASTATIN CALCIUM 80 MG/1
80 TABLET, FILM COATED ORAL DAILY
Qty: 90 TABLET | Refills: 3 | Status: SHIPPED | OUTPATIENT
Start: 2022-08-16

## 2022-09-27 DIAGNOSIS — I25.10 CORONARY ARTERY DISEASE INVOLVING NATIVE CORONARY ARTERY OF NATIVE HEART WITHOUT ANGINA PECTORIS: Primary | ICD-10-CM

## 2022-12-09 ENCOUNTER — LAB (OUTPATIENT)
Dept: LAB | Facility: HOSPITAL | Age: 67
End: 2022-12-09

## 2022-12-09 DIAGNOSIS — I25.10 CORONARY ARTERY DISEASE INVOLVING NATIVE CORONARY ARTERY OF NATIVE HEART WITHOUT ANGINA PECTORIS: ICD-10-CM

## 2022-12-09 LAB
ALBUMIN SERPL-MCNC: 4.2 G/DL (ref 3.5–5.2)
ALP SERPL-CCNC: 81 U/L (ref 39–117)
ALT SERPL W P-5'-P-CCNC: 17 U/L (ref 1–41)
AST SERPL-CCNC: 19 U/L (ref 1–40)
BILIRUB CONJ SERPL-MCNC: <0.2 MG/DL (ref 0–0.3)
BILIRUB INDIRECT SERPL-MCNC: NORMAL MG/DL
BILIRUB SERPL-MCNC: 0.5 MG/DL (ref 0–1.2)
CHOLEST SERPL-MCNC: 124 MG/DL (ref 0–200)
HDLC SERPL-MCNC: 31 MG/DL (ref 40–60)
LDLC SERPL CALC-MCNC: 70 MG/DL (ref 0–100)
LDLC/HDLC SERPL: 2.17 {RATIO}
PROT SERPL-MCNC: 7 G/DL (ref 6–8.5)
TRIGL SERPL-MCNC: 128 MG/DL (ref 0–150)
VLDLC SERPL-MCNC: 23 MG/DL (ref 5–40)

## 2022-12-09 PROCEDURE — 36415 COLL VENOUS BLD VENIPUNCTURE: CPT

## 2022-12-09 PROCEDURE — 80076 HEPATIC FUNCTION PANEL: CPT

## 2022-12-09 PROCEDURE — 80061 LIPID PANEL: CPT

## 2022-12-09 NOTE — PROGRESS NOTES
Reviewed results and recommendations with Meng Pabon.  Patient verbalized understanding of results and recommendations.    Thank you,  Mikaela John RN  Triage Nurse [facility]

## 2022-12-14 ENCOUNTER — OFFICE VISIT (OUTPATIENT)
Dept: INTERNAL MEDICINE | Age: 67
End: 2022-12-14

## 2022-12-14 VITALS
DIASTOLIC BLOOD PRESSURE: 86 MMHG | SYSTOLIC BLOOD PRESSURE: 130 MMHG | OXYGEN SATURATION: 98 % | WEIGHT: 234.8 LBS | TEMPERATURE: 97.1 F | HEIGHT: 71 IN | HEART RATE: 62 BPM | BODY MASS INDEX: 32.87 KG/M2

## 2022-12-14 DIAGNOSIS — Z80.0 FAMILY HISTORY OF MALIGNANT NEOPLASM OF PANCREAS: ICD-10-CM

## 2022-12-14 DIAGNOSIS — Z84.81 FAMILY HISTORY OF BRCA GENE MUTATION: Primary | ICD-10-CM

## 2022-12-14 PROCEDURE — 99213 OFFICE O/P EST LOW 20 MIN: CPT | Performed by: NURSE PRACTITIONER

## 2022-12-14 PROCEDURE — 90677 PCV20 VACCINE IM: CPT | Performed by: NURSE PRACTITIONER

## 2022-12-14 PROCEDURE — G0009 ADMIN PNEUMOCOCCAL VACCINE: HCPCS | Performed by: NURSE PRACTITIONER

## 2022-12-14 NOTE — PROGRESS NOTES
"    I N T E R N A L  M E D I C I N E  ION MCMANUS, APRN      ENCOUNTER DATE:  12/14/2022    Meng Pabon / 67 y.o. / male      CHIEF COMPLAINT / REASON FOR OFFICE VISIT     GENETIC TESTING (Brother dx with pancreatic cancer, daughters tested positive for breast ca gene)      ASSESSMENT & PLAN     Problem List Items Addressed This Visit    None  Visit Diagnoses     Family history of BRCA gene mutation    -  Primary    Relevant Orders    Ambulatory Referral to Genetic Counseling/Testing    Family history of malignant neoplasm of pancreas        Relevant Orders    Ambulatory Referral to Genetic Counseling/Testing        Orders Placed This Encounter   Procedures   • Pneumococcal Conjugate Vaccine 20-Valent (PCV20)   • Ambulatory Referral to Genetic Counseling/Testing     No orders of the defined types were placed in this encounter.      SUMMARY/DISCUSSION  •       Next Appointment with me: 5/15/2023    No follow-ups on file.      VITAL SIGNS     Visit Vitals  /86 (Cuff Size: Large Adult)   Pulse 62   Temp 97.1 °F (36.2 °C) (Temporal)   Ht 180.3 cm (71\")   Wt 107 kg (234 lb 12.8 oz)   SpO2 98%   BMI 32.75 kg/m²       @BP@  Wt Readings from Last 3 Encounters:   12/14/22 107 kg (234 lb 12.8 oz)   08/08/22 103 kg (226 lb 6.4 oz)   04/15/22 103 kg (227 lb 3.2 oz)     Body mass index is 32.75 kg/m².      MEDICATIONS AT THE TIME OF OFFICE VISIT     Current Outpatient Medications on File Prior to Visit   Medication Sig   • aspirin (aspirin) 81 MG EC tablet Take 1 tablet by mouth Daily.   • atenolol (TENORMIN) 50 MG tablet Take 1.5 tablets by mouth Daily.   • atorvastatin (LIPITOR) 80 MG tablet Take 1 tablet by mouth Daily.   • ibuprofen (ADVIL,MOTRIN) 200 MG tablet Take 200 mg by mouth As Needed for Mild Pain .   • isosorbide mononitrate (IMDUR) 30 MG 24 hr tablet Take 1 tablet by mouth Daily.   • lisinopril (PRINIVIL,ZESTRIL) 20 MG tablet Take 1 tablet by mouth Daily.   • nitroglycerin (NITROSTAT) 0.4 MG SL tablet " Place 1 tablet under the tongue Every 5 (Five) Minutes As Needed for Chest Pain (Only if SBP Greater Than 100). Take no more than 3 doses in 15 minutes.     No current facility-administered medications on file prior to visit.          HISTORY OF PRESENT ILLNESS     Patient presents with reported history of pancreatic cancer in brother, BRCA gene and both daughters.  He has no breast tenderness, discharge, no abdominal pain changes in stool nausea or vomiting.  CBC shows no signs of blood disorder.  Colonoscopy up-to-date in 2022, CT of the chest stable July 2022, PSA completed yearly with no interval increase.    REVIEW OF SYSTEMS     Constitutional neg except per HPI   Resp neg  CV neg    PHYSICAL EXAMINATION     Physical Exam  Constitutional  No distress  Cardiovascular Rate  normal . Rhythm: regular . Heart sounds:  normal  Pulmonary/Chest  Effort normal. Breath sounds:  normal  Psychiatric  Alert. Judgment and thought content normal. Mood normal     REVIEWED DATA     Labs:     Lab Results   Component Value Date    WBC 5.55 04/11/2022    HGB 14.8 04/11/2022    HCT 42.3 04/11/2022    MCV 83.9 04/11/2022     04/11/2022         Imaging:           Medical Tests:           Summary of old records / correspondence / consultant report:           Request outside records:             *Examiner was wearing medical surgical mask.    **Dragon dictation used for documentation

## 2022-12-27 ENCOUNTER — TELEPHONE (OUTPATIENT)
Dept: GENETICS | Facility: HOSPITAL | Age: 67
End: 2022-12-27

## 2022-12-27 NOTE — TELEPHONE ENCOUNTER
Called patient and gathered family history prior to tomorrow's genetic appt. He will also try to bring a copy of his daughters genetic test results.

## 2022-12-28 ENCOUNTER — CLINICAL SUPPORT (OUTPATIENT)
Dept: GENETICS | Facility: HOSPITAL | Age: 67
End: 2022-12-28

## 2022-12-28 DIAGNOSIS — Z80.3 FAMILY HISTORY OF BREAST CANCER: ICD-10-CM

## 2022-12-28 DIAGNOSIS — Z13.79 GENETIC TESTING: Primary | ICD-10-CM

## 2022-12-28 DIAGNOSIS — Z80.0 FAMILY HISTORY OF PANCREATIC CANCER: ICD-10-CM

## 2022-12-28 DIAGNOSIS — Z84.81 FAMILY HISTORY OF BRCA2 GENE POSITIVE: ICD-10-CM

## 2022-12-28 PROCEDURE — 96040: CPT

## 2022-12-28 NOTE — PROGRESS NOTES
Meng Pabon, a 67-year-old male, was referred for genetic counseling due to a family history of pancreatic cancer and of a BRCA2 mutation identified in his daughters. Mr. Pabon has a personal history of basal cell skin cancer in his mid-40s. He reports having colonoscopies every 3 years and reports having 2 polyps total found. He was interested in discussing his risk for a hereditary cancer syndrome. Mr. Pabon decided to pursue comprehensive genetic testing to evaluate his risk of cancer, therefore the CancerNext Panel was ordered through [facility] which analyzes the BRCA2 gene and 35 additional genes associated with an increased cancer risk. His blood was drawn on 12/28/22. Results are expected in 2-3 weeks.      PERTINENT FAMILY HISTORY: (See attached pedigree)   Daughter (x2):     BRCA2+ ([id number])  Brother:      Pancreatic cancer, 67  Sister:       Breast cancer, 37  Mat. Aunt:      Breast cancer, 40s  Mat. Grandfather:     Unknown cancer, 30s  Father:      Basal cell  Pat. Uncle:      Unknown cancer    We do not have medical records regarding any of these diagnoses. A copy of both of Mr. Pabon’s daughters’ genetic testing results was available for review.    RISK ASSESSMENT:  Mr. Pabon’s family history of pancreatic cancer raises the question of a hereditary cancer syndrome.  NCCN guidelines for genetic testing for BRCA1/2 states that individuals with a close relative diagnosed with a pathogenic variant in BRCA1/2 may consider genetic testing. Based on Mr. Pabon’s daughters’ genetic testing revealing a pathogenic variant in the BRCA2 gene, he would clearly meet this criteria. Mr. Pabon has a 50% chance of testing positive for the known familial BRCA1 mutation. Due to the additional family history of cancer, Mr. Pabon elected to pursue multi-gene panel testing. This risk assessment is based on the family history information provided at the time of the appointment. The assessment could change in the future  should new information be obtained.    GENETIC COUNSELING (30 minutes):  We reviewed the family history information in detail. Cases of cancer follow three general patterns: sporadic, familial, and hereditary.  While most cancer is sporadic, some cases appear to occur in family clusters.  These cases are said to be familial and account for 10-20% of cancer cases.  Familial cases may be due to a combination of shared genes and environmental factors among family members.  In even fewer cases, the risk for cancer is inherited, and the genes responsible for the increased cancer risk are known.       Family histories typical of hereditary cancer syndromes usually include multiple first- and second-degree relatives diagnosed with cancer types that define a syndrome.  These cases tend to be diagnosed at younger-than-expected ages and can be bilateral or multifocal.  The cancer in these families follows an autosomal dominant inheritance pattern, which indicates the likely presence of a mutation in a cancer susceptibility gene.  Children and siblings of an individual believed to carry this mutation have a 50% chance of inheriting that mutation, thereby inheriting the increased risk to develop cancer.  These mutations can be passed down from the maternal or the paternal lineage.     Due to Mr. Pabon’s family history of breast cancer, we discussed hereditary breast cancer. Hereditary breast cancer accounts for 5-10% of all cases of breast cancer.  A significant proportion of hereditary breast cancer can be attributed to mutations in the BRCA1 and BRCA2 genes.  Mutations in these genes confer an increased risk for breast cancer, ovarian cancer, male breast cancer, prostate cancer and pancreatic cancer.  Women with a BRCA2 mutation have up to an 87% risk of breast cancer, and up to a 29% risk of ovarian cancer. Additionally, mutations in the BRCA2 gene confer an increased risk of pancreatic cancer, melanoma, male breast cancer,  and prostate cancer.  We discussed the NCCN guidelines management recommendations for individuals with a BRCA2 mutation.      There are other genes that are known to be associated with an increased risk for cancer.  Some of these genes have well defined cancer risks and established management guidelines.  Other genes that can be tested for have been more recently described, and there may be less data regarding the risks and therefore may not have established management guidelines. We discussed these limitations at length.  Based on Mr. Pabon’s desire to get as much information as possible regarding his personal risks and potential risks for his family, he opted to pursue testing through a panel that would look at several other genes known to increase the risk for cancer.     GENETIC TESTING:  The risks, benefits, and limitations of genetic testing and implications for clinical management following testing were reviewed.  DNA test results can influence decisions regarding screening and prevention.  Genetic testing can have significant psychological implications for both individuals and families. Also discussed was the possibility of employment and insurance discrimination based on genetic test results and the laws in place to prevent this, as well as the limitations of these laws.       We discussed panel testing, which would involve testing 36 genes associated with increased cancer risk. The implications of a positive or negative test result were discussed. We discussed the possibility that, in some cases, genetic test results may be ambiguous due to the identification of a genetic variant of uncertain significance (VUS). These variants may or may not be associated with an increased cancer risk. With multigene panel testing, it is not uncommon for a VUS to be identified.  If a VUS is identified, testing family members is typically not recommended and screening recommendations are made based on the family history.   The laboratories that perform genetic testing work to reclassify the VUS and send out an amended report if and when a VUS is reclassified.  The majority of variant findings are ultimately reclassified to a negative result. Given Mr. Pabon’s family history, a negative test result does not eliminate all cancer risk, although the risk may not be as high as it would with positive genetic testing.     PLAN: Genetic testing was ordered via the CancerNext Panel through [facility]. Results are expected in 2-3 weeks. If he has any questions in the meantime, he is welcome to call me at [phone].      Joann Valencia MS, [facility], [facility]  Licensed Certified Genetic Counselor

## 2023-01-10 ENCOUNTER — HOSPITAL ENCOUNTER (OUTPATIENT)
Dept: CT IMAGING | Facility: HOSPITAL | Age: 68
Discharge: HOME | End: 2023-01-10
Admitting: NURSE PRACTITIONER
Payer: MEDICARE

## 2023-01-10 DIAGNOSIS — R91.1 LUNG NODULE: ICD-10-CM

## 2023-01-10 PROCEDURE — 71250 CT THORAX DX C-: CPT

## 2023-01-16 ENCOUNTER — TELEPHONE (OUTPATIENT)
Dept: ORTHOPEDIC SURGERY | Facility: CLINIC | Age: 68
End: 2023-01-16

## 2023-01-16 NOTE — TELEPHONE ENCOUNTER
Caller: PATIENT    Relationship to patient: SELF     Best call back number: [phone]    Chief complaint: BILATERAL KNEE PAIN    Type of visit: INJECTION    Requested date: ASAP     Additional notes: PATIENT WAS CALLING TO SCHEDULE AN INJECTION WITH DR. AYALA AT [facility] FOR BOTH KNEES. PATIENT STATED HE LAST SEEN MS. GARCIA FOR HIS KNEE PAIN DUE TO FALLING AND SHE WAS ABLE TO SEE HIM RIGHT AWAY. PATIENT STATED HE IS OPEN TO SEE DR. AYALA OR [facility] FOR THIS INJECTION BUT WOULD LIKE TO SEE DR. AYALA IF POSSIBLE. THANK YOU!

## 2023-01-19 ENCOUNTER — DOCUMENTATION (OUTPATIENT)
Dept: GENETICS | Facility: HOSPITAL | Age: 68
End: 2023-01-19
Payer: MEDICARE

## 2023-01-19 ENCOUNTER — TELEPHONE (OUTPATIENT)
Dept: GENETICS | Facility: HOSPITAL | Age: 68
End: 2023-01-19
Payer: MEDICARE

## 2023-01-19 NOTE — PROGRESS NOTES
Meng Pabon, a 67-year-old male, was referred for genetic counseling due to a family history of pancreatic cancer and of a BRCA2 mutation identified in his daughters. Mr. Pabon has a personal history of basal cell skin cancer in his mid-40s. He reports having colonoscopies every 3 years and reports having 2 polyps total found. He was interested in discussing his risk for a hereditary cancer syndrome. Mr. Pabon decided to pursue comprehensive genetic testing to evaluate his risk of cancer, therefore the CancerNext Panel was ordered through [employer] which analyzes the BRCA2 gene and 35 additional genes associated with an increased cancer risk. Testing was positive for a pathogenic mutation in the BRCA2 gene (c.1832C>A). These results were discussed with Mr. Pabon by telephone on 1/19/2023.      PERTINENT FAMILY HISTORY: (See attached pedigree)   Daughter (x2):    BRCA2+ (c.1832C>A)  Brother:     Pancreatic cancer, 67  Sister:      Breast cancer, 37  Mat. Aunt:     Breast cancer, 40s  Mat. Grandfather:    Unknown cancer, 30s  Father:     Basal cell  Pat. Uncle:     Unknown cancer    We do not have medical records regarding any of these diagnoses. A copy of both of Mr. Pabon’s daughters’ genetic testing results was available for review.    RISK ASSESSMENT:  Mr. Pabon’s family history of pancreatic cancer raises the question of a hereditary cancer syndrome.  NCCN guidelines for genetic testing for BRCA1/2 states that individuals with a close relative diagnosed with a pathogenic variant in BRCA1/2 may consider genetic testing. Based on Mr. Pabon’s daughters’ genetic testing revealing a pathogenic variant in the BRCA2 gene, he would clearly meet this criteria. Mr. Pabon has a 50% chance of testing positive for the known familial BRCA1 mutation. Due to the additional family history of cancer, Mr. Pabon elected to pursue multi-gene panel testing. This risk assessment is based on the family history information provided  at the time of the appointment. The assessment could change in the future should new information be obtained.    GENETIC COUNSELING (30 minutes):  We reviewed the family history information in detail. Cases of cancer follow three general patterns: sporadic, familial, and hereditary.  While most cancer is sporadic, some cases appear to occur in family clusters.  These cases are said to be familial and account for 10-20% of cancer cases.  Familial cases may be due to a combination of shared genes and environmental factors among family members.  In even fewer cases, the risk for cancer is inherited, and the genes responsible for the increased cancer risk are known.       Family histories typical of hereditary cancer syndromes usually include multiple first- and second-degree relatives diagnosed with cancer types that define a syndrome.  These cases tend to be diagnosed at younger-than-expected ages and can be bilateral or multifocal.  The cancer in these families follows an autosomal dominant inheritance pattern, which indicates the likely presence of a mutation in a cancer susceptibility gene.  Children and siblings of an individual believed to carry this mutation have a 50% chance of inheriting that mutation, thereby inheriting the increased risk to develop cancer.  These mutations can be passed down from the maternal or the paternal lineage.     Due to Mr. Pabon’s family history of breast cancer, we discussed hereditary breast cancer. Hereditary breast cancer accounts for 5-10% of all cases of breast cancer.  A significant proportion of hereditary breast cancer can be attributed to mutations in the BRCA1 and BRCA2 genes.  Mutations in these genes confer an increased risk for breast cancer, ovarian cancer, male breast cancer, prostate cancer and pancreatic cancer.  Women with a BRCA2 mutation have up to an 87% risk of breast cancer, and up to a 29% risk of ovarian cancer. Additionally, mutations in the BRCA2 gene  confer an increased risk of pancreatic cancer, melanoma, male breast cancer, and prostate cancer.  We discussed the NCCN guidelines management recommendations for individuals with a BRCA2 mutation.      There are other genes that are known to be associated with an increased risk for cancer.  Some of these genes have well defined cancer risks and established management guidelines.  Other genes that can be tested for have been more recently described, and there may be less data regarding the risks and therefore may not have established management guidelines. We discussed these limitations at length.  Based on Mr. Pabon’s desire to get as much information as possible regarding his personal risks and potential risks for his family, he opted to pursue testing through a panel that would look at several other genes known to increase the risk for cancer.     GENETIC TESTING:  The risks, benefits, and limitations of genetic testing and implications for clinical management following testing were reviewed.  DNA test results can influence decisions regarding screening and prevention.  Genetic testing can have significant psychological implications for both individuals and families. Also discussed was the possibility of employment and insurance discrimination based on genetic test results and the laws in place to prevent this, as well as the limitations of these laws.       We discussed panel testing, which would involve testing 36 genes associated with increased cancer risk. The implications of a positive or negative test result were discussed. We discussed the possibility that, in some cases, genetic test results may be ambiguous due to the identification of a genetic variant of uncertain significance (VUS). These variants may or may not be associated with an increased cancer risk. With multigene panel testing, it is not uncommon for a VUS to be identified.  If a VUS is identified, testing family members is typically not  recommended and screening recommendations are made based on the family history.  The laboratories that perform genetic testing work to reclassify the VUS and send out an amended report if and when a VUS is reclassified.  The majority of variant findings are ultimately reclassified to a negative result. Given Mr. Pabon’s family history, a negative test result does not eliminate all cancer risk, although the risk may not be as high as it would with positive genetic testing.     TEST RESULTS: Testing identified a pathogenic mutation in the BRCA2 gene (c.1832C>A). This is causative of Hereditary Breast and Ovarian Cancer syndrome (HBOC). Genetic testing for the specific BRCA2 mutation is indicated for other family members to determine whether they have inherited this risk factor. Siblings and children of an individual who has a BRCA2 mutation are at a 50% chance to have inherited the familial BRCA2 mutation. Testing is available to individuals age 18 or older.   Mr. Pabon would need to provide relatives with a copy of his genetic test results so that appropriate testing can be ordered for the specific familial mutation.     Until each at-risk family member has been proven not to carry this mutation in BRCA2, he or she should be offered increased surveillance.  We would be happy to see any family members who live in the area in our clinic to further discuss this information and testing options.  They can request a referral to [facility], and our coordinator will contact the individual to schedule an appointment once the referral is received.  They can call [phone] to receive more information on how to place the referral. For family members who live elsewhere, there are genetic counselors at most [facility] medical centers.  They can find a genetic counselor by visiting the National Society of Genetic Counselors website at www.nsgc.org or they can call our office and we would be happy to give them the contact  information of the closest genetic counselor.      CANCER RISK: Mutations in BRCA2 confer a 56-84% lifetime risk of female breast cancer, a 13-29% lifetime risk of ovarian cancer, and a 40-60% lifetime risk of a second breast cancer in someone who has already been diagnosed with breast cancer. Additionally, there is as high as a 7% risk for male breast cancer and up to a 61% risk for prostate cancer.  BRCA2 mutations have also been associated with increased risks for other types of cancer, including pancreatic cancer (up to 7% lifetime risk), and ocular and cutaneous melanoma (<5% lifetime risk).    CANCER SCREENING:  Options available to individuals with a BRCA2 mutation and at high risk for breast and ovarian cancer were discussed, including increased surveillance, chemoprevention and prophylactic surgery (mastectomy and/or oophorectomy).      For women with a BRCA2 mutation who choose not to undergo bilateral mastectomy, increased breast cancer surveillance is warranted. Increased surveillance, based on NCCN guidelines, would consist of semi-annual clinical breast exam and monthly self-breast exam starting at age 18, annual breast MRI starting at age 25, and annual mammography and breast MRI starting at age 30. For women with a BRCA2 mutation who have not had a breast cancer diagnosis, breast cancer chemoprevention is another option available.  Studies have shown that Tamoxifen and Raloxifene can cut the risk of estrogen receptor positive breast cancer by 50% when taken by high-risk women.  There are risks associated with these medications; therefore, the risks versus benefits must be considered prior to deciding to take chemopreventative medications.     Current data does not support routine ovarian cancer screening.  We briefly discussed transvaginal ultrasound and serum CA-125, however neither has been found to be sufficiently sensitive or specific to be recommended for individuals that have an increased risk  for ovarian cancer.  Some physicians consider offering screening between ages 30-35, before a patient is at an age where BSO is typically offered.      Screening for males with BRCA2 mutations should include prostate cancer screening beginning at age 40, self-breast exam, and clinical breast exam beginning at 35.  Mammography can be considered in males with gynecomastia.  Given the risk for melanoma, annual skin and eye examinations should be considered for both males and females.  The importance of monthly self-skin exams was emphasized, as well as diligence in following up with a clinician when a mole or suspicious skin lesion is identified. There are no standardized screening tests that have been proven to be effective in early pancreatic cancer detection.  In the absence of a family history of pancreatic cancer, there are not typically screening recommendations made.  In cases where an individual has a BRCA2 mutation and family history of pancreatic cancer some experts consider screening with endoscopic ultrasound, high-resolution pancreatic protocol CT, or MRI, starting at age 50 or 10 years younger than the earliest diagnosis of pancreas cancer in the family.  Since these screening methods are not standard of care, consideration of referral to a clinical research screening program is appropriate if a patient wishes to pursue screening. Of note, Mr. Pabon does have a family history of pancreatic cancer.     SURGICAL OPTIONS: For women with a pathogenic variant in BRCA2, risk-reducing bilateral mastectomy has been shown to reduce the risk of breast cancer by approximately 90%.  Risk-reducing bilateral salpingo-oopherectomy (BSO) has been shown to reduce the risk of ovarian cancer by as much as 96%. It has been suggested that risk-reducing BSO in high-risk women be done by a surgeon with experience in this population, such as a gynecologic oncologist.  Careful removal of the fallopian tubes is essential, due to  the residual risk for fallopian tube cancer in BRCA positive patients. Additionally, 2-10% of BRCA positive patients are found to have ovarian cancer at the time of BSO; therefore, careful pathologic exam of the ovaries by serial sectioning is recommended.  In addition, cytologic evaluation of peritoneal washings could be considered.     PLAN:  This information was discussed in detail by telephone and a copy of this note is being mailed to Mr. Pabon.  He is encouraged to contact our office if he would like to schedule a follow-up appointment or phone call at [phone] to review this information further.     Joann Valencia MS, [facility]  Licensed Certified Genetic Counselor    Cc: SARIKA Mejias

## 2023-01-23 ENCOUNTER — CLINICAL SUPPORT (OUTPATIENT)
Dept: ORTHOPEDIC SURGERY | Facility: CLINIC | Age: 68
End: 2023-01-23
Payer: MEDICARE

## 2023-01-23 VITALS — HEIGHT: 71 IN | WEIGHT: 242 LBS | BODY MASS INDEX: 33.88 KG/M2 | TEMPERATURE: 97.5 F

## 2023-01-23 DIAGNOSIS — G89.29 CHRONIC PAIN OF BOTH KNEES: Primary | ICD-10-CM

## 2023-01-23 DIAGNOSIS — M25.562 CHRONIC PAIN OF BOTH KNEES: Primary | ICD-10-CM

## 2023-01-23 DIAGNOSIS — M25.561 CHRONIC PAIN OF BOTH KNEES: Primary | ICD-10-CM

## 2023-01-23 DIAGNOSIS — M17.0 BILATERAL PRIMARY OSTEOARTHRITIS OF KNEE: ICD-10-CM

## 2023-01-23 PROCEDURE — 20610 DRAIN/INJ JOINT/BURSA W/O US: CPT | Performed by: NURSE PRACTITIONER

## 2023-01-23 RX ORDER — LIDOCAINE HYDROCHLORIDE 10 MG/ML
2 INJECTION, SOLUTION EPIDURAL; INFILTRATION; INTRACAUDAL; PERINEURAL
Status: COMPLETED | OUTPATIENT
Start: 2023-01-23 | End: 2023-01-23

## 2023-01-23 RX ORDER — ISOSORBIDE MONONITRATE 30 MG/1
TABLET, EXTENDED RELEASE ORAL
Qty: 90 TABLET | Refills: 1 | Status: SHIPPED | OUTPATIENT
Start: 2023-01-23

## 2023-01-23 RX ORDER — METHYLPREDNISOLONE ACETATE 80 MG/ML
80 INJECTION, SUSPENSION INTRA-ARTICULAR; INTRALESIONAL; INTRAMUSCULAR; SOFT TISSUE
Status: COMPLETED | OUTPATIENT
Start: 2023-01-23 | End: 2023-01-23

## 2023-01-23 RX ORDER — ATENOLOL 50 MG/1
TABLET ORAL
Qty: 135 TABLET | Refills: 1 | Status: SHIPPED | OUTPATIENT
Start: 2023-01-23

## 2023-01-23 RX ORDER — LISINOPRIL 20 MG/1
TABLET ORAL
Qty: 90 TABLET | Refills: 1 | Status: SHIPPED | OUTPATIENT
Start: 2023-01-23

## 2023-01-23 RX ADMIN — METHYLPREDNISOLONE ACETATE 80 MG: 80 INJECTION, SUSPENSION INTRA-ARTICULAR; INTRALESIONAL; INTRAMUSCULAR; SOFT TISSUE at 14:57

## 2023-01-23 RX ADMIN — LIDOCAINE HYDROCHLORIDE 2 ML: 10 INJECTION, SOLUTION EPIDURAL; INFILTRATION; INTRACAUDAL; PERINEURAL at 14:57

## 2023-01-23 RX ADMIN — LIDOCAINE HYDROCHLORIDE 2 ML: 10 INJECTION, SOLUTION EPIDURAL; INFILTRATION; INTRACAUDAL; PERINEURAL at 14:58

## 2023-01-23 RX ADMIN — METHYLPREDNISOLONE ACETATE 80 MG: 80 INJECTION, SUSPENSION INTRA-ARTICULAR; INTRALESIONAL; INTRAMUSCULAR; SOFT TISSUE at 14:58

## 2023-01-23 NOTE — PROGRESS NOTES
Mr. Pabon comes in today for follow-up.  Injections have worked well in the past.  The patient would like to get repeat injections today.  The risks, benefits and alternatives were discussed and the patient consented.  Going forward, the patient will follow-up as needed.    SARIKA Morales    01/23/2023        Large Joint Arthrocentesis: R knee  Date/Time: 1/23/2023 2:57 PM  Consent given by: patient  Site marked: site marked  Timeout: Immediately prior to procedure a time out was called to verify the correct patient, procedure, equipment, support staff and site/side marked as required   Supporting Documentation  Indications: pain   Procedure Details  Location: knee - R knee  Preparation: Patient was prepped and draped in the usual sterile fashion  Needle gauge: 21.  Approach: anterolateral  Medications administered: 80 mg methylPREDNISolone acetate 80 MG/ML; 2 mL lidocaine PF 1% 1 %  Patient tolerance: patient tolerated the procedure well with no immediate complications    Large Joint Arthrocentesis: L knee  Date/Time: 1/23/2023 2:58 PM  Consent given by: patient  Site marked: site marked  Timeout: Immediately prior to procedure a time out was called to verify the correct patient, procedure, equipment, support staff and site/side marked as required   Supporting Documentation  Indications: pain   Procedure Details  Location: knee - L knee  Preparation: Patient was prepped and draped in the usual sterile fashion  Needle gauge: 21.  Approach: anterolateral  Medications administered: 80 mg methylPREDNISolone acetate 80 MG/ML; 2 mL lidocaine PF 1% 1 %  Patient tolerance: patient tolerated the procedure well with no immediate complications

## 2023-01-27 ENCOUNTER — TELEPHONE (OUTPATIENT)
Dept: SURGERY | Facility: CLINIC | Age: 68
End: 2023-01-27
Payer: MEDICARE

## 2023-01-30 ENCOUNTER — OFFICE VISIT (OUTPATIENT)
Dept: SURGERY | Facility: CLINIC | Age: 68
End: 2023-01-30
Payer: MEDICARE

## 2023-01-30 DIAGNOSIS — R91.1 LUNG NODULE: Primary | ICD-10-CM

## 2023-01-30 PROCEDURE — 99213 OFFICE O/P EST LOW 20 MIN: CPT

## 2023-01-30 NOTE — PROGRESS NOTES
"Chief Complaint  Lung nodule follow up     History of Present Illness     Mr. Pabon is a 68YO male who presents today via telephone visit for continued surveillance of multiple lung nodules. He has been established with our clinic since may 2021 for a 1.2cm RUL nodule. He was last seen in our office on 07/25/22. He presents to discuss his 6-month interval CT chest.     He denies any significant shortness of breath, fevers, or unintentional weight loss. 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 He reports he is a never smoker.     Objective   Vital Signs: Deferred secondary to phone visit  There were no vitals taken for this visit.  Estimated body mass index is 33.77 kg/m² as calculated from the following:    Height as of 1/23/23: 180.3 cm (70.98\").    Weight as of 1/23/23: 110 kg (242 lb).             Physical Exam deferred secondary to phone visit  Result Review :      Data reviewed: Radiologic studies CT of the Chest     I have independently reviewed the CT of the chest performed on 1/10/2023 which demonstrates a the stable RUL calcified granuloma which appears unchanged compared to previous imaging along with tiny non-calcified micronodules previously demonstrated on prior CT chest.          Assessment and Plan   Diagnoses and all orders for this visit:    1. Lung nodule (Primary)  -     CT Chest Without Contrast; Future      The right upper lobe lung nodule appears stable and unchanged compared to previous imaging along with tiny noncalcified micronodules previously demonstrated on prior CT chest.  I recommend repeating another CT of the chest in 6 months. He is agreeable.      I spent 15 minutes caring for Meng on this date of service. This time includes time spent by me in the following activities:preparing for the visit, counseling and educating the patient/family/caregiver, ordering medications, tests, or procedures, documenting information in the medical record and " independently interpreting results and communicating that information with the patient/family/caregiver        Follow Up   Return in about 6 months (around 7/30/2023) for Next scheduled follow up.  Patient was given instructions and counseling regarding his condition or for health maintenance advice. Please see specific information pulled into the AVS if appropriate.     You have chosen to receive care through a telephone visit. Do you consent to use a telephone visit for your medical care today?  Yes

## 2023-02-16 ENCOUNTER — OFFICE VISIT (OUTPATIENT)
Dept: CARDIOLOGY | Facility: CLINIC | Age: 68
End: 2023-02-16
Payer: MEDICARE

## 2023-02-16 VITALS
HEIGHT: 71 IN | WEIGHT: 229 LBS | HEART RATE: 52 BPM | DIASTOLIC BLOOD PRESSURE: 80 MMHG | SYSTOLIC BLOOD PRESSURE: 138 MMHG | BODY MASS INDEX: 32.06 KG/M2

## 2023-02-16 DIAGNOSIS — I10 ESSENTIAL HYPERTENSION: ICD-10-CM

## 2023-02-16 DIAGNOSIS — I49.3 FREQUENT PVCS: ICD-10-CM

## 2023-02-16 DIAGNOSIS — E78.2 MIXED HYPERLIPIDEMIA: ICD-10-CM

## 2023-02-16 DIAGNOSIS — I25.10 CORONARY ARTERY DISEASE INVOLVING NATIVE CORONARY ARTERY OF NATIVE HEART WITHOUT ANGINA PECTORIS: Primary | ICD-10-CM

## 2023-02-16 PROCEDURE — 93000 ELECTROCARDIOGRAM COMPLETE: CPT | Performed by: INTERNAL MEDICINE

## 2023-02-16 PROCEDURE — 99214 OFFICE O/P EST MOD 30 MIN: CPT | Performed by: INTERNAL MEDICINE

## 2023-02-16 NOTE — PROGRESS NOTES
Encounter Date:23  Patient:Meng Pabon  :1955  MRN:[id number]      Chief Complaint:   CAD  Essential hypertension  Hyperlipidemia    HPI  Meng Pabon is a 67 y.o. male who is here for follow-up of CAD.  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  A nuclear stress test was ordered for further evaluation.  This revealed a small mildly severe area of ischemia at the apex and inferior wall with EKG changes during stress.  He ultimately underwent cardiac catheterization revealing 90% lesion of proximal to mid circumflex status post MISSY.  He also has known  of mid LAD with right to left collaterals being treated medically.    He has been doing great since his last visit.  He denies any chest pain or dyspnea on exertion.  He occasionally get lightheaded after a very hot bath.    Review of Systems:  Review of Systems   Constitutional: Negative for fever and malaise/fatigue.   HENT: Negative for nosebleeds and sore throat.    Eyes: Negative for blurred vision and double vision.   Cardiovascular: Negative for chest pain, claudication, dyspnea on exertion, leg swelling, orthopnea, palpitations and syncope.   Respiratory: Negative for cough, shortness of breath and snoring.    Endocrine: Negative for cold intolerance, heat intolerance and polydipsia.   Skin: Negative for itching, poor wound healing and rash.   Musculoskeletal: Negative for joint pain, joint swelling, muscle weakness and myalgias.   Gastrointestinal: Negative for abdominal pain, melena, nausea and vomiting.   Neurological: Negative for light-headedness, loss of balance, seizures, vertigo and weakness.   Psychiatric/Behavioral: Negative for altered mental status and depression.       Current Outpatient Medications on File Prior to Visit   Medication Sig Dispense Refill   • aspirin (aspirin)  81 MG EC tablet Take 1 tablet by mouth Daily.     • atenolol (TENORMIN) 50 MG tablet TAKE 1 AND 1/2 TABLETS     DAILY 135 tablet 1   • atorvastatin (LIPITOR) 80 MG tablet Take 1 tablet by mouth Daily. 90 tablet 3   • ibuprofen (ADVIL,MOTRIN) 200 MG tablet Take 200 mg by mouth As Needed for Mild Pain .     • isosorbide mononitrate (IMDUR) 30 MG 24 hr tablet TAKE 1 TABLET DAILY 90 tablet 1   • lisinopril (PRINIVIL,ZESTRIL) 20 MG tablet TAKE 1 TABLET DAILY 90 tablet 1   • nitroglycerin (NITROSTAT) 0.4 MG SL tablet Place 1 tablet under the tongue Every 5 (Five) Minutes As Needed for Chest Pain (Only if SBP Greater Than 100). Take no more than 3 doses in 15 minutes. 30 tablet 6     No current facility-administered medications on file prior to visit.       No Known Allergies    Past Medical History:   Diagnosis Date   • Allergic    • Atypical chest pain    • Basal cell carcinoma     15 years prior from 2021   • CAD (coronary artery disease)    • Chronic total occlusion of coronary artery    • Colon polyp    • Erectile dysfunction    • GERD (gastroesophageal reflux disease)    • Hyperlipidemia    • Hypertension    • Lung nodule        Past Surgical History:   Procedure Laterality Date   • CARDIAC CATHETERIZATION     • CARDIAC CATHETERIZATION N/A 08/03/2021    Procedure: Coronary angiography;  Surgeon: Han Nicholas MD;  Location: [facility] INVASIVE LOCATION;  Service: Cardiology;  Laterality: N/A;   • CARDIAC CATHETERIZATION N/A 08/03/2021    Procedure: Left Heart Cath;  Surgeon: Han Nicholas MD;  Location: [facility] INVASIVE LOCATION;  Service: Cardiology;  Laterality: N/A;   • CARDIAC CATHETERIZATION N/A 08/03/2021    Procedure: Left ventriculography;  Surgeon: Han Nicholas MD;  Location: [facility] CATH INVASIVE LOCATION;  Service: Cardiology;  Laterality: N/A;   • CARDIAC CATHETERIZATION N/A 08/03/2021    Procedure: Stent MISSY coronary;  Surgeon: Han Nicholas MD;  Location: [facility]  "CATH INVASIVE LOCATION;  Service: Cardiology;  Laterality: N/A;   • COLONOSCOPY  April 7 2022   • CORONARY STENT PLACEMENT     • INNER EAR SURGERY     • VASECTOMY         Social History     Socioeconomic History   • Marital status:    Tobacco Use   • Smoking status: Never   • Smokeless tobacco: Never   Vaping Use   • Vaping Use: Never used   Substance and Sexual Activity   • Alcohol use: Yes     Alcohol/week: 2.0 standard drinks     Types: 2 Cans of beer per week     Comment: nightly, caffeine - tea   • Drug use: Never   • Sexual activity: Yes     Partners: Female     Birth control/protection: Post-menopausal       Family History   Problem Relation Age of Onset   • Hypertension Father    • Skin cancer Father    • Heart disease Father    • Heart disease Sister    • No Known Problems Mother    • Pancreatic cancer Brother        The following portions of the patient's history were reviewed and updated as appropriate: allergies, current medications, past family history, past medical history, past social history, past surgical history and problem list.       Objective:       Vitals:    02/16/23 1547   BP: 138/80   Pulse: 52   Weight: 104 kg (229 lb)   Height: 180.3 cm (71\")       Constitutional:       Appearance: Well-developed.   Eyes:      General: No scleral icterus.     Conjunctiva/sclera: Conjunctivae normal.   HENT:      Head: Normocephalic and atraumatic.   Neck:      Thyroid: No thyromegaly.      Vascular: Normal carotid pulses. No carotid bruit, hepatojugular reflux or JVD.      Trachea: No tracheal deviation.   Pulmonary:      Effort: No respiratory distress.      Breath sounds: Normal breath sounds. No decreased breath sounds. No wheezing. No rhonchi. No rales.   Chest:      Chest wall: Not tender to palpatation.   Cardiovascular:      Normal rate. Regular rhythm.      No gallop.   Pulses:     Carotid: 2+ bilaterally.     Radial: 2+ bilaterally.     Femoral: 2+ bilaterally.     Dorsalis pedis: 2+ " bilaterally.     Posterior tibial: 2+ bilaterally.  Abdominal:      General: Bowel sounds are normal. There is no distension.      Palpations: Abdomen is soft.      Tenderness: There is no abdominal tenderness.   Musculoskeletal:         General: No deformity.      Cervical back: Normal range of motion and neck supple. Skin:     Findings: No erythema or rash.   Neurological:      Mental Status: Alert and oriented to person, place, and time.      Sensory: No sensory deficit.   Psychiatric:         Behavior: Behavior normal.         Lab Results   Component Value Date     04/11/2022     08/04/2021    K 4.5 04/11/2022    K 4.4 08/04/2021     (H) 04/11/2022     08/04/2021    CO2 24.9 04/11/2022    CO2 19.5 (L) 08/04/2021    BUN 17 04/11/2022    BUN 18 08/04/2021    CREATININE 0.93 04/11/2022    CREATININE 0.83 08/04/2021    EGFRIFNONA 93 08/04/2021    EGFRIFNONA 90 08/02/2021    EGFRIFAFRI 86 04/13/2021    EGFRIFAFRI  09/14/2016      Comment:      <15 Indicative of kidney failure.    GLUCOSE 100 (H) 04/11/2022    GLUCOSE 94 08/04/2021    CALCIUM 8.8 04/11/2022    CALCIUM 8.7 08/04/2021    PROTENTOTREF 7.0 04/13/2021    ALBUMIN 4.20 12/09/2022    ALBUMIN 3.60 04/11/2022    BILITOT 0.5 12/09/2022    BILITOT 0.5 04/11/2022    AST 19 12/09/2022    AST 13 04/11/2022    ALT 17 12/09/2022    ALT 19 04/11/2022     Lab Results   Component Value Date    WBC 5.55 04/11/2022    WBC 7.94 08/04/2021    HGB 14.8 04/11/2022    HGB 14.7 08/04/2021    HCT 42.3 04/11/2022    HCT 43.7 08/04/2021    MCV 83.9 04/11/2022    MCV 85.0 08/04/2021     04/11/2022     08/04/2021     Lab Results   Component Value Date    CHOL 124 12/09/2022    CHOL 149 04/11/2022    TRIG 128 12/09/2022    TRIG 64 04/11/2022    HDL 31 (L) 12/09/2022    HDL 40 04/11/2022    LDL 70 12/09/2022    LDL 96 04/11/2022     No results found for: PROBNP, BNP  Lab Results   Component Value Date    TROPONINT <0.010 08/03/2021     Lab Results    Component Value Date    TSH 3.810 04/11/2022     10/12/21  • No ECG evidence of myocardial ischemia.  • Arrhythmias during stress: occasional PVCs, non-sustained VT 3 beats noted during stress, couplets.  • Arrhythmias during recovery: occasional PAC's, rare PVC's, sustained VT 3 beats noted during recovery. Recovery arrhythmias comment: Pt had a 3 beat of a idioventricular rhythm in recovery.  • Ventricular ectopy noted seems to have worsened with exercise.        ECG 12 Lead    Date/Time: 2/16/2023 3:50 PM  Performed by: Han Nicholas MD  Authorized by: Han Nicholas MD   Comparison: compared with previous ECG from 8/8/2022  Similar to previous ECG  Rhythm: sinus rhythm  Rate: normal  QRS axis: normal    Clinical impression: normal ECG               Assessment:       Plan:       CAD - hx of PCI in 2021.  Patient without anginal complaints.    -Continue aspirin 81 mg p.o. daily.  Tolerating well.    -Continue atenolol 50 mg p.o. daily.  No change in that dose.  Resting heart rate 52 bpm.  No issues with fatigue or syncope.  - Continue lisinopril 20 mg p.o. daily.  No issues with hyperkalemia or renal insufficiency on that therapy.    Hypertension - BP is controlled.  Continue current medications.    -Continue lisinopril 20 mg p.o. daily.  Continue atenolol at current dose.  No changes.    Hyperlipidemia - reviewed lipid panel from May 2022.  LDL is elevated.  Will increase atorvastatin to 80 mg daily        02/16/23  15:53 EST

## 2023-05-15 ENCOUNTER — OFFICE VISIT (OUTPATIENT)
Dept: INTERNAL MEDICINE | Age: 68
End: 2023-05-15
Payer: MEDICARE

## 2023-05-15 VITALS
SYSTOLIC BLOOD PRESSURE: 122 MMHG | TEMPERATURE: 97.1 F | HEIGHT: 71 IN | OXYGEN SATURATION: 95 % | BODY MASS INDEX: 31.89 KG/M2 | HEART RATE: 51 BPM | WEIGHT: 227.8 LBS | DIASTOLIC BLOOD PRESSURE: 70 MMHG

## 2023-05-15 DIAGNOSIS — I25.10 CORONARY ARTERY DISEASE INVOLVING NATIVE CORONARY ARTERY OF NATIVE HEART WITHOUT ANGINA PECTORIS: ICD-10-CM

## 2023-05-15 DIAGNOSIS — R91.1 LUNG NODULE: ICD-10-CM

## 2023-05-15 DIAGNOSIS — Z00.00 MEDICARE ANNUAL WELLNESS VISIT, SUBSEQUENT: Primary | ICD-10-CM

## 2023-05-15 DIAGNOSIS — I10 ESSENTIAL HYPERTENSION: ICD-10-CM

## 2023-05-15 DIAGNOSIS — R73.01 IMPAIRED FASTING BLOOD SUGAR: ICD-10-CM

## 2023-05-15 DIAGNOSIS — E78.2 MIXED HYPERLIPIDEMIA: ICD-10-CM

## 2023-05-15 DIAGNOSIS — Z12.5 SCREENING PSA (PROSTATE SPECIFIC ANTIGEN): ICD-10-CM

## 2023-05-15 DIAGNOSIS — R10.9 LEFT FLANK PAIN: ICD-10-CM

## 2023-05-15 LAB
ALBUMIN SERPL-MCNC: 4.4 G/DL (ref 3.5–5.2)
ALBUMIN/GLOB SERPL: 1.8 G/DL
ALP SERPL-CCNC: 77 U/L (ref 39–117)
ALT SERPL-CCNC: 23 U/L (ref 1–41)
APPEARANCE UR: CLEAR
AST SERPL-CCNC: 20 U/L (ref 1–40)
BASOPHILS # BLD AUTO: 0.02 10*3/MM3 (ref 0–0.2)
BASOPHILS NFR BLD AUTO: 0.3 % (ref 0–1.5)
BILIRUB SERPL-MCNC: 0.8 MG/DL (ref 0–1.2)
BILIRUB UR QL STRIP: NEGATIVE
BUN SERPL-MCNC: 18 MG/DL (ref 8–23)
BUN/CREAT SERPL: 21.4 (ref 7–25)
CALCIUM SERPL-MCNC: 9.4 MG/DL (ref 8.6–10.5)
CHLORIDE SERPL-SCNC: 104 MMOL/L (ref 98–107)
CHOLEST SERPL-MCNC: 113 MG/DL (ref 0–200)
CHOLEST/HDLC SERPL: 3.9 {RATIO}
CO2 SERPL-SCNC: 25.5 MMOL/L (ref 22–29)
COLOR UR: YELLOW
CREAT SERPL-MCNC: 0.84 MG/DL (ref 0.76–1.27)
EGFRCR SERPLBLD CKD-EPI 2021: 96.8 ML/MIN/1.73
EOSINOPHIL # BLD AUTO: 0.09 10*3/MM3 (ref 0–0.4)
EOSINOPHIL NFR BLD AUTO: 1.5 % (ref 0.3–6.2)
ERYTHROCYTE [DISTWIDTH] IN BLOOD BY AUTOMATED COUNT: 12.7 % (ref 12.3–15.4)
GLOBULIN SER CALC-MCNC: 2.5 GM/DL
GLUCOSE SERPL-MCNC: 96 MG/DL (ref 65–99)
GLUCOSE UR QL STRIP: NEGATIVE
HBA1C MFR BLD: 5.8 % (ref 4.8–5.6)
HCT VFR BLD AUTO: 44.8 % (ref 37.5–51)
HDLC SERPL-MCNC: 29 MG/DL (ref 40–60)
HGB BLD-MCNC: 15.3 G/DL (ref 13–17.7)
HGB UR QL STRIP: NEGATIVE
IMM GRANULOCYTES # BLD AUTO: 0.02 10*3/MM3 (ref 0–0.05)
IMM GRANULOCYTES NFR BLD AUTO: 0.3 % (ref 0–0.5)
KETONES UR QL STRIP: NEGATIVE
LDLC SERPL CALC-MCNC: 67 MG/DL (ref 0–100)
LEUKOCYTE ESTERASE UR QL STRIP: NEGATIVE
LYMPHOCYTES # BLD AUTO: 2.17 10*3/MM3 (ref 0.7–3.1)
LYMPHOCYTES NFR BLD AUTO: 35.6 % (ref 19.6–45.3)
MCH RBC QN AUTO: 29.7 PG (ref 26.6–33)
MCHC RBC AUTO-ENTMCNC: 34.2 G/DL (ref 31.5–35.7)
MCV RBC AUTO: 87 FL (ref 79–97)
MONOCYTES # BLD AUTO: 0.57 10*3/MM3 (ref 0.1–0.9)
MONOCYTES NFR BLD AUTO: 9.4 % (ref 5–12)
NEUTROPHILS # BLD AUTO: 3.22 10*3/MM3 (ref 1.7–7)
NEUTROPHILS NFR BLD AUTO: 52.9 % (ref 42.7–76)
NITRITE UR QL STRIP: NEGATIVE
NRBC BLD AUTO-RTO: 0 /100 WBC (ref 0–0.2)
PH UR STRIP: 7 [PH] (ref 5–8)
PLATELET # BLD AUTO: 272 10*3/MM3 (ref 140–450)
POTASSIUM SERPL-SCNC: 4.9 MMOL/L (ref 3.5–5.2)
PROT SERPL-MCNC: 6.9 G/DL (ref 6–8.5)
PROT UR QL STRIP: NEGATIVE
PSA SERPL-MCNC: 0.48 NG/ML (ref 0–4)
RBC # BLD AUTO: 5.15 10*6/MM3 (ref 4.14–5.8)
SODIUM SERPL-SCNC: 139 MMOL/L (ref 136–145)
SP GR UR STRIP: 1.01 (ref 1–1.03)
T4 FREE SERPL-MCNC: 1.3 NG/DL (ref 0.93–1.7)
TRIGL SERPL-MCNC: 86 MG/DL (ref 0–150)
TSH SERPL DL<=0.005 MIU/L-ACNC: 2.69 UIU/ML (ref 0.27–4.2)
UROBILINOGEN UR STRIP-MCNC: NORMAL MG/DL
VLDLC SERPL CALC-MCNC: 17 MG/DL (ref 5–40)
WBC # BLD AUTO: 6.09 10*3/MM3 (ref 3.4–10.8)

## 2023-05-15 NOTE — PROGRESS NOTES
I N T E R N A L  M E D I C I N E  ION MCMANUS, SARIKA      ENCOUNTER DATE:  05/15/2023    Meng Pabon / 67 y.o. / male        MEDICARE ANNUAL WELLNESS VISIT       Chief Complaint: Medicare Wellness-subsequent, Back Pain, Hyperlipidemia, and Hypertension       Patient's general assessment of his health since a year ago:     - Compared to one year ago, he feels his physical health is about the same without significant change.    - Compared to one year ago, he feels his mental health is about the same without significant change.      HPI for other active medical problems:     Cardiology: Followed by Dr. Nicholas. Cardiac cath in the past with medication treatment today with aspirin 325mg, imdur 30mg daily, atenolol 50mg BID, and atorvastatin 40mg nightly. Hyperlipidemia well controlled, along with HTN on lisinopril 20mg daily.      Hypertension well-controlled with atenolol and lisinopril 20 mg daily. Denies any chest pain, shortness of air, headache, visual disturbances, lower extremity leg swelling, or heart palpitations.      Hyperlipidemia: Well-controlled with atorvastatin 40 mg daily.  Myalgias, normal liver enzymes.    Blood sugar in prediabetic range with hemoglobin A1c of 5.7.     Never a smoker, no vaping, no substance abuse and approx two beers nightly.  He has however had a lung nodule which is now followed by thoracic surgery with tiny noncalcified micronodules, CT scheduled for July.     Up-to-date on colonoscopy as of April 2022, history of polyps, performing every 5 years.     PSA last 0.482.  Nocturia x2 nightly. Increased fluid intake/tea at nighttime.     Complain of intermittent left-sided flank pain, he does have BRCA gene with 7% increased risk of pancreatic cancer.  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  Pain comes and goes, no hematuria.  No nausea vomiting or abdominal pain.     * The required components of Health  "Risk Assessment (HRA) that were completed by the patient and/or my staff are contained within this note and in the scanned documents titled \"Health Risk Assessment\" within the media section of the patient's chart in [facility].         HISTORY       Recent Hospitalizations:    Recent hospitalization?: No     If YES, location, date, and diagnoses:     · Location:   · Date:   · Principle Discharge Dx:   · Secondary Dx:       Patient Care Team:    Patient Care Team:  Carlos Riley APRN as PCP - General (Internal Medicine)  Han Nicholas MD as Consulting Physician (Cardiology)  Rosibel Ha APRN as Nurse Practitioner (Nurse Practitioner)  Giovany Mijares MD as Consulting Physician (Orthopedic Surgery)  Berta Avelar DNP, APRN as Nurse Practitioner (Thoracic Surgery)      Allergies:  Patient has no known allergies.    Medications:  Current Outpatient Medications on File Prior to Visit   Medication Sig Dispense Refill   • aspirin (aspirin) 81 MG EC tablet Take 1 tablet by mouth Daily.     • atenolol (TENORMIN) 50 MG tablet TAKE 1 AND 1/2 TABLETS     DAILY 135 tablet 1   • atorvastatin (LIPITOR) 80 MG tablet Take 1 tablet by mouth Daily. 90 tablet 3   • ibuprofen (ADVIL,MOTRIN) 200 MG tablet Take 1 tablet by mouth As Needed for Mild Pain.     • isosorbide mononitrate (IMDUR) 30 MG 24 hr tablet TAKE 1 TABLET DAILY 90 tablet 1   • lisinopril (PRINIVIL,ZESTRIL) 20 MG tablet TAKE 1 TABLET DAILY 90 tablet 1   • nitroglycerin (NITROSTAT) 0.4 MG SL tablet Place 1 tablet under the tongue Every 5 (Five) Minutes As Needed for Chest Pain (Only if SBP Greater Than 100). Take no more than 3 doses in 15 minutes. 30 tablet 6     No current facility-administered medications on file prior to visit.        PFSH:     The following portions of the patient's history were reviewed and updated as appropriate: Allergies / Current Medications / Past Medical History / Surgical History / Social History / Family History    Problem " List:  Patient Active Problem List   Diagnosis   • Coronary artery disease involving native coronary artery of native heart without angina pectoris   • Chronic total occlusion of coronary artery   • Mixed hyperlipidemia   • Essential hypertension   • Lung nodule       Past Medical History:  Past Medical History:   Diagnosis Date   • Allergic    • Atypical chest pain    • Basal cell carcinoma     15 years prior from 2021   • CAD (coronary artery disease)    • Chronic total occlusion of coronary artery    • Colon polyp    • Erectile dysfunction    • GERD (gastroesophageal reflux disease)    • Hyperlipidemia    • Hypertension    • Lung nodule        Past Surgical History:  Past Surgical History:   Procedure Laterality Date   • CARDIAC CATHETERIZATION     • CARDIAC CATHETERIZATION N/A 08/03/2021    Procedure: Coronary angiography;  Surgeon: Han Nicholas MD;  Location:  [facility];  Service: Cardiology;  Laterality: N/A;   • CARDIAC CATHETERIZATION N/A 08/03/2021    Procedure: Left Heart Cath;  Surgeon: Han Nicholas MD;  Location:  [facility];  Service: Cardiology;  Laterality: N/A;   • CARDIAC CATHETERIZATION N/A 08/03/2021    Procedure: Left ventriculography;  Surgeon: Han Nicholas MD;  Location:  [facility];  Service: Cardiology;  Laterality: N/A;   • CARDIAC CATHETERIZATION N/A 08/03/2021    Procedure: Stent MISSY coronary;  Surgeon: Han Nicholas MD;  Location:  [facility];  Service: Cardiology;  Laterality: N/A;   • COLONOSCOPY  April 7 2022   • CORONARY STENT PLACEMENT     • INNER EAR SURGERY     • VASECTOMY         Social History:  Social History     Socioeconomic History   • Marital status:    Tobacco Use   • Smoking status: Never   • Smokeless tobacco: Never   Vaping Use   • Vaping Use: Never used   Substance and Sexual Activity   • Alcohol use: Yes     Alcohol/week: 2.0 standard drinks     Types:  "2 Cans of beer per week     Comment: nightly, caffeine - tea   • Drug use: Never   • Sexual activity: Yes     Partners: Female     Birth control/protection: Post-menopausal       Family History:  Family History   Problem Relation Age of Onset   • Hypertension Father    • Skin cancer Father    • Heart disease Father    • Heart disease Sister    • No Known Problems Mother    • Pancreatic cancer Brother          PATIENT ASSESSMENT     Vitals:  /70 (Cuff Size: Adult)   Pulse 51   Temp 97.1 °F (36.2 °C) (Temporal)   Ht 180.3 cm (71\")   Wt 103 kg (227 lb 12.8 oz)   SpO2 95%   BMI 31.77 kg/m²   BP Readings from Last 3 Encounters:   05/15/23 122/70   02/16/23 138/80   12/14/22 130/86     Wt Readings from Last 3 Encounters:   05/15/23 103 kg (227 lb 12.8 oz)   02/16/23 104 kg (229 lb)   01/23/23 110 kg (242 lb)      Body mass index is 31.77 kg/m².    Pain Score    05/15/23 0938   PainSc:   3   PainLoc: Back         Review of Systems:    Review of Systems  Constitutional neg except per HPI   Resp neg  CV neg  Musc left flank pain    Physical Exam:    Physical Exam  Constitutional  No distress  Cardiovascular Rate  normal . Rhythm: regular . Heart sounds:  normal  Pulmonary/Chest  Effort normal. Breath sounds:  normal  Musc neg CVA tenderness   Psychiatric  Alert. Judgment and thought content normal. Mood normal     Reviewed Data:    Labs:   Lab Results   Component Value Date     04/11/2022    K 4.5 04/11/2022    CALCIUM 8.8 04/11/2022    AST 19 12/09/2022    ALT 17 12/09/2022    BUN 17 04/11/2022    CREATININE 0.93 04/11/2022    CREATININE 0.83 08/04/2021    CREATININE 0.86 08/02/2021    EGFRIFNONA 93 08/04/2021    EGFRIFAFRI 86 04/13/2021       Lab Results   Component Value Date    HGBA1C 5.70 (H) 04/11/2022    HGBA1C 5.80 (H) 08/04/2021       Lab Results   Component Value Date    LDL 70 12/09/2022    LDL 96 04/11/2022    LDL 85 08/04/2021    HDL 31 (L) 12/09/2022    TRIG 128 12/09/2022    CHOLHDLRATIO 3.73 " 04/11/2022       Lab Results   Component Value Date    TSH 3.810 04/11/2022    FREET4 1.20 04/11/2022          Lab Results   Component Value Date    WBC 5.55 04/11/2022    HGB 14.8 04/11/2022    HGB 14.7 08/04/2021    HGB 14.8 08/02/2021     04/11/2022                   Lab Results   Component Value Date    PSA 0.482 04/11/2022       Imaging:          Medical Tests:          Screening for Glaucoma:  Previous screening for glaucoma?: No; declines       Hearing Loss Screen:  Finger Rub Hearing Test (right ear): borderline  Finger Rub Hearing Test (left ear): borderline      Urinary Incontinence Screen:  Episodes of urinary incontinence? : No      Depression Screen:      5/15/2023     9:36 AM   PHQ-2/PHQ-9 Depression Screening   Little Interest or Pleasure in Doing Things 0-->not at all   Feeling Down, Depressed or Hopeless 0-->not at all   PHQ-9: Brief Depression Severity Measure Score 0        PHQ-2: 0 (Not depressed)    PHQ-9: 0 (Negative screening for depression)       FUNCTIONAL, FALL RISK, & COGNITIVE SCREENING (Components below):    DATA:        5/15/2023     9:34 AM   Functional & Cognitive Status   Do you have difficulty preparing food and eating? No   Do you have difficulty bathing yourself, getting dressed or grooming yourself? Yes   Do you have difficulty using the toilet? No   Do you have difficulty moving around from place to place? No   Do you have trouble with steps or getting out of a bed or a chair? Yes   Current Diet Unhealthy Diet   Dental Exam Not up to date   Eye Exam Not up to date   Exercise (times per week) 5 times per week   Current Exercises Include Other   Do you need help using the phone?  No   Are you deaf or do you have serious difficulty hearing?  Yes   Do you need help with transportation? No   Do you need help shopping? No   Do you need help preparing meals?  No   Do you need help with housework?  No   Do you need help with laundry? No   Do you need help taking your medications?  No   Do you need help managing money? No   Do you ever drive or ride in a car without wearing a seat belt? No         A) Assessment of Functional Ability:  (Assessment of ability to perform ADL's (showering/bathing, using toilet, dressing, feeding self, moving self around) and IADL's (use telephone, shop, prepare food, housekeep, do laundry, transport independently, take medications independently, and handle finances)    Degree of functional impairment: NONE (based on assessment noted above)      B) Assessment of Fall Risk:  Fall Risk Assessment was completed, and patient is at low risk for falls.       Need for further evaluation of gait, strength, and balance? : No    Timed Up and Go (TUG):   (>= 12 seconds indicates high risk for falling)    Observable abnormalities included: Normal gait pattern       C. Assessment of Cognitive Function:    Mini-Cog Test:     1) Registration (3 objects): Yes   2) Number of objects recalled: 3   3) Clock Draw: Passed? : Yes       Further evaluation required? : No        COUNSELING       A. Identification of Health Risk Factors:    Risk factors include: cardiovascular risk factors      B. Age-Appropriate Screening Schedule:  (Refer to the list below for future screening recommendations based on patient's age, sex and/or medical conditions. Orders for these recommended tests are listed in the plan section. The patient has been provided with a written plan)    Health Maintenance Topics  Health Maintenance   Topic Date Due   • TDAP/TD VACCINES (1 - Tdap) Never done   • COVID-19 Vaccine (3 - Booster for Pfizer series) 06/11/2021   • ZOSTER VACCINE (2 of 2) 06/10/2022   • INFLUENZA VACCINE  08/01/2023   • LIPID PANEL  12/09/2023   • ANNUAL WELLNESS VISIT  05/15/2024   • COLORECTAL CANCER SCREENING  04/07/2025   • HEPATITIS C SCREENING  Completed   • Pneumococcal Vaccine 65+  Completed       Health Maintenance Topics Due or Over-Due  Health Maintenance Due   Topic Date Due   • TDAP/TD  VACCINES (1 - Tdap) Never done   • COVID-19 Vaccine (3 - Booster for Pfizer series) 06/11/2021   • ZOSTER VACCINE (2 of 2) 06/10/2022         C. Advanced Care Planning:    Advance Care Planning   ACP discussion was held with the patient during this visit. Patient has an advance directive in EMR which is still valid.        D. Patient Self-Management and Personalized Health Advice:    He has been provided with personalized counseling/information (including brochures/handouts) about:     -- optimizing diet/nutrition plans, improving exercise / conditioning and reducing risk for cardiovascular disease (heart, stroke, vascular)      He has been recommended for the following preventative services which has been performed today, will be ordered today or ordered/performed on upcoming follow-up visit:     -- NUTRITION counseling provided, EXERCISE counseling provided, FALL RISK assessment / plan of care completed, URINARY incontinence assessment done, PROSTATE CANCER screening (discussed and PSA ordered +/- LUDMILA performed), **COLORECTAL cancer screening (colonoscopy/Cologuard discussed or ordered), vaccination for Tdap / Td administered (or recommended), vaccination for SHINGRIX administered  (or recommended), COVID-19 vaccination (and/or booster) recommendations provided      E. Miscellaneous Items:    -Aspirin use counseling: Taking ASA appropriately as indicated    -Discussed BMI with him. The BMI is above average; BMI management plan is completed (discussed plans for weight loss)    -Reviewed use of high risk medication in the elderly: YES    -Reviewed for potential of harmful drug interactions in the elderly: YES        WRAP UP       Assessment & Plan:  1) MEDICARE ANNUAL WELLNESS VISIT    2) OTHER MEDICAL CONDITIONS ADDRESSED TODAY:            Problem List Items Addressed This Visit        Cardiac and Vasculature    Coronary artery disease involving native coronary artery of native heart without angina pectoris     Relevant Medications    aspirin (aspirin) 81 MG EC tablet    nitroglycerin (NITROSTAT) 0.4 MG SL tablet    isosorbide mononitrate (IMDUR) 30 MG 24 hr tablet    atenolol (TENORMIN) 50 MG tablet    Mixed hyperlipidemia    Relevant Medications    atorvastatin (LIPITOR) 80 MG tablet    Other Relevant Orders    Lipid Panel With / Chol / HDL Ratio    TSH+Free T4    Essential hypertension    Relevant Medications    lisinopril (PRINIVIL,ZESTRIL) 20 MG tablet    atenolol (TENORMIN) 50 MG tablet    Other Relevant Orders    Comprehensive Metabolic Panel    CBC w AUTO Differential    Urinalysis With Microscopic If Indicated (No Culture) - Urine, Clean Catch       Pulmonary and Pneumonias    Lung nodule    Current Assessment & Plan     Followed by Thoracic surgery, updated CT chest scheduled for July 2023        Other Visit Diagnoses     Medicare annual wellness visit, subsequent    -  Primary    Screening PSA (prostate specific antigen)        Relevant Orders    PSA Screen    Impaired fasting blood sugar        Relevant Orders    Hemoglobin A1c    Left flank pain                        Orders Placed This Encounter   Procedures   • Comprehensive Metabolic Panel   • Lipid Panel With / Chol / HDL Ratio   • TSH+Free T4   • PSA Screen   • Urinalysis With Microscopic If Indicated (No Culture) - Urine, Clean Catch   • Hemoglobin A1c   • CBC w AUTO Differential       Discussion / Summary:  · For left flank pain we will go ahead and check urinalysis first for any protein or hematuria that would be reflective of kidney stones, high suspicion for musculoskeletal involvement especially with heavy lifting, intermittent pain and improvement with Tylenol or ibuprofen as needed.  However he does have a history of BRCA gene and brother with pancreatic cancer, may consider CT of the abdomen post labs or if symptoms continue.  · Continue management with cardiology and thoracic surgery.  · Continue atorvastatin for hyperlipidemia and lisinopril  and atenolol for hypertension.  · Declines COVID-vaccine today, recommend Shingrix at local pharmacy along with tetanus  · Follow-up in 6 months for chronic medical, 1 year annual wellness     Medications as of TODAY:              Current Outpatient Medications   Medication Sig Dispense Refill   • aspirin (aspirin) 81 MG EC tablet Take 1 tablet by mouth Daily.     • atenolol (TENORMIN) 50 MG tablet TAKE 1 AND 1/2 TABLETS     DAILY 135 tablet 1   • atorvastatin (LIPITOR) 80 MG tablet Take 1 tablet by mouth Daily. 90 tablet 3   • ibuprofen (ADVIL,MOTRIN) 200 MG tablet Take 1 tablet by mouth As Needed for Mild Pain.     • isosorbide mononitrate (IMDUR) 30 MG 24 hr tablet TAKE 1 TABLET DAILY 90 tablet 1   • lisinopril (PRINIVIL,ZESTRIL) 20 MG tablet TAKE 1 TABLET DAILY 90 tablet 1   • nitroglycerin (NITROSTAT) 0.4 MG SL tablet Place 1 tablet under the tongue Every 5 (Five) Minutes As Needed for Chest Pain (Only if SBP Greater Than 100). Take no more than 3 doses in 15 minutes. 30 tablet 6     No current facility-administered medications for this visit.         FOLLOW-UP:            Return in about 6 months (around 11/15/2023) for Next scheduled follow up; 1 year medicare wellness .                 Future Appointments   Date Time Provider Department Center   7/26/2023 10:45 AM JUNIOR CT 2 [facility] JUNIOR CT JUNIOR   7/31/2023  1:30 PM Tad Ibanez APRN [facility] J[facility]IOR   11/16/2023  3:45 PM Carlos Riley APRN [facility]  [facility]           After Visit Summary (AVS) including the Personalized Prevention  Plan Services (PPPS) was either printed and given to the patient at check-out today and/or sent to [facility] [place] for review.     **Dragon dictation used for documentation.

## 2023-07-24 RX ORDER — LISINOPRIL 20 MG/1
TABLET ORAL
Qty: 90 TABLET | Refills: 3 | Status: SHIPPED | OUTPATIENT
Start: 2023-07-24

## 2023-07-24 RX ORDER — ATENOLOL 50 MG/1
TABLET ORAL
Qty: 135 TABLET | Refills: 1 | Status: SHIPPED | OUTPATIENT
Start: 2023-07-24

## 2023-07-24 RX ORDER — ATORVASTATIN CALCIUM 80 MG/1
TABLET, FILM COATED ORAL
Qty: 90 TABLET | Refills: 3 | Status: SHIPPED | OUTPATIENT
Start: 2023-07-24

## 2023-07-24 RX ORDER — ISOSORBIDE MONONITRATE 30 MG/1
TABLET, EXTENDED RELEASE ORAL
Qty: 90 TABLET | Refills: 1 | Status: SHIPPED | OUTPATIENT
Start: 2023-07-24

## 2023-07-26 ENCOUNTER — HOSPITAL ENCOUNTER (OUTPATIENT)
Dept: CT IMAGING | Facility: HOSPITAL | Age: 68
End: 2023-07-26
Payer: MEDICARE

## 2023-07-26 ENCOUNTER — HOSPITAL ENCOUNTER (OUTPATIENT)
Dept: CT IMAGING | Facility: HOSPITAL | Age: 68
Discharge: HOME | End: 2023-07-26
Payer: MEDICARE

## 2023-07-26 DIAGNOSIS — R91.1 LUNG NODULE: ICD-10-CM

## 2023-07-26 PROCEDURE — 71250 CT THORAX DX C-: CPT

## 2023-07-28 ENCOUNTER — TELEPHONE (OUTPATIENT)
Dept: SURGERY | Facility: CLINIC | Age: 68
End: 2023-07-28
Payer: MEDICARE

## 2023-07-31 ENCOUNTER — OFFICE VISIT (OUTPATIENT)
Dept: SURGERY | Facility: CLINIC | Age: 68
End: 2023-07-31
Payer: MEDICARE

## 2023-07-31 VITALS
WEIGHT: 227 LBS | BODY MASS INDEX: 31.78 KG/M2 | HEIGHT: 71 IN | DIASTOLIC BLOOD PRESSURE: 70 MMHG | HEART RATE: 78 BPM | SYSTOLIC BLOOD PRESSURE: 120 MMHG | OXYGEN SATURATION: 96 %

## 2023-07-31 DIAGNOSIS — R91.8 MULTIPLE LUNG NODULES ON CT: Primary | ICD-10-CM

## 2023-07-31 PROCEDURE — 1160F RVW MEDS BY RX/DR IN RCRD: CPT

## 2023-07-31 PROCEDURE — 3074F SYST BP LT 130 MM HG: CPT

## 2023-07-31 PROCEDURE — 99214 OFFICE O/P EST MOD 30 MIN: CPT

## 2023-07-31 PROCEDURE — 1159F MED LIST DOCD IN RCRD: CPT

## 2023-07-31 PROCEDURE — 3078F DIAST BP <80 MM HG: CPT

## 2023-08-01 PROBLEM — R91.8 MULTIPLE LUNG NODULES ON CT: Status: ACTIVE | Noted: 2023-01-30

## 2023-08-23 ENCOUNTER — TELEPHONE (OUTPATIENT)
Dept: CARDIOLOGY | Facility: CLINIC | Age: 68
End: 2023-08-23

## 2023-08-23 NOTE — TELEPHONE ENCOUNTER
Caller: Meng Pabon    Relationship: Self    Best call back number: [phone]    What form or medical record are you requesting: DOT CLEARANCE FORM- DR. RICHMOND STATED TO THE PATIENT HE DIDN'T NEED A STRESS TEST AT HIS LAST APPOINTMENT AND HE WOULD LIKE TO COME  THAT LETTER FOR DOT    Who is requesting this form or medical record from you: DEPARTMENT OF TRANSPORTATION    How would you like to receive the form or medical records (pick-up, mail, fax): WILL    If pick-up, provide patient with address and location details    Timeframe paperwork needed:ASAP    Additional notes:

## 2023-08-23 NOTE — TELEPHONE ENCOUNTER
See below.    When you get a chance can you call and dictate a letter for his DOT clearance.  We saw him last on 2/16/23.    Thanks,  Karli

## 2023-08-29 ENCOUNTER — DOCUMENTATION (OUTPATIENT)
Dept: CARDIOLOGY | Facility: CLINIC | Age: 68
End: 2023-08-29
Payer: MEDICARE

## 2023-08-29 NOTE — PROGRESS NOTES
8/29/23      To whom it may concern,     I care for Mr. Meng Pabon my cardiology office in [facility].  He is a pleasant 66-year-old male with a medical history of coronary artery disease with prior PCI.  He has no angina.  He has a stress test within the last 24 months with no evidence of ischemia.  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

## 2023-11-16 ENCOUNTER — OFFICE VISIT (OUTPATIENT)
Dept: INTERNAL MEDICINE | Age: 68
End: 2023-11-16
Payer: MEDICARE

## 2023-11-16 VITALS
WEIGHT: 230.8 LBS | DIASTOLIC BLOOD PRESSURE: 72 MMHG | OXYGEN SATURATION: 96 % | HEIGHT: 71 IN | HEART RATE: 67 BPM | BODY MASS INDEX: 32.31 KG/M2 | TEMPERATURE: 98.2 F | SYSTOLIC BLOOD PRESSURE: 118 MMHG

## 2023-11-16 DIAGNOSIS — Z23 NEED FOR SHINGLES VACCINE: ICD-10-CM

## 2023-11-16 DIAGNOSIS — E78.2 MIXED HYPERLIPIDEMIA: Primary | ICD-10-CM

## 2023-11-16 DIAGNOSIS — R73.01 IMPAIRED FASTING BLOOD SUGAR: ICD-10-CM

## 2023-11-16 DIAGNOSIS — I10 ESSENTIAL HYPERTENSION: ICD-10-CM

## 2023-11-16 PROCEDURE — 3078F DIAST BP <80 MM HG: CPT | Performed by: NURSE PRACTITIONER

## 2023-11-16 PROCEDURE — 3074F SYST BP LT 130 MM HG: CPT | Performed by: NURSE PRACTITIONER

## 2023-11-16 PROCEDURE — 99214 OFFICE O/P EST MOD 30 MIN: CPT | Performed by: NURSE PRACTITIONER

## 2023-11-16 NOTE — PROGRESS NOTES
"    I N T E R N A L  M E D I C I N E  ION MCMANUS, APRN      ENCOUNTER DATE:  11/16/2023    Meng Pabon / 68 y.o. / male      CHIEF COMPLAINT / REASON FOR OFFICE VISIT     Hyperlipidemia and Hypertension      ASSESSMENT & PLAN     Problem List Items Addressed This Visit          Cardiac and Vasculature    Mixed hyperlipidemia - Primary    Relevant Medications    atorvastatin (LIPITOR) 80 MG tablet    Other Relevant Orders    Comprehensive Metabolic Panel    Lipid Panel With / Chol / HDL Ratio    Essential hypertension    Relevant Medications    lisinopril (PRINIVIL,ZESTRIL) 20 MG tablet    atenolol (TENORMIN) 50 MG tablet    Other Relevant Orders    Comprehensive Metabolic Panel     Other Visit Diagnoses       Impaired fasting blood sugar        Relevant Orders    Comprehensive Metabolic Panel    Hemoglobin A1c    Need for shingles vaccine        Relevant Medications    Zoster Vac Recomb Adjuvanted 50 MCG/0.5ML reconstituted suspension          Orders Placed This Encounter   Procedures    Comprehensive Metabolic Panel    Lipid Panel With / Chol / HDL Ratio    Hemoglobin A1c     New Medications Ordered This Visit   Medications    Zoster Vac Recomb Adjuvanted 50 MCG/0.5ML reconstituted suspension     Sig: Inject 0.5 mL into the appropriate muscle as directed by prescriber 1 (One) Time for 1 dose.     Dispense:  1 each     Refill:  0       SUMMARY/DISCUSSION  BMI is >= 30 and <35. (Class 1 Obesity). The following options were offered after discussion;: exercise counseling/recommendations and nutrition counseling/recommendations     Next Appointment with me: Visit date not found    Return in about 6 months (around 5/16/2024) for Medicare Wellness.      VITAL SIGNS     Visit Vitals  /72 (BP Location: Left arm, Patient Position: Sitting, Cuff Size: Adult)   Pulse 67   Temp 98.2 °F (36.8 °C) (Temporal)   Ht 180.3 cm (71\")   Wt 105 kg (230 lb 12.8 oz)   SpO2 96%   BMI 32.19 kg/m²     Wt Readings from Last 3 " Encounters:   11/16/23 105 kg (230 lb 12.8 oz)   07/31/23 103 kg (227 lb)   05/15/23 103 kg (227 lb 12.8 oz)     Body mass index is 32.19 kg/m².    MEDICATIONS AT THE TIME OF OFFICE VISIT     Current Outpatient Medications on File Prior to Visit   Medication Sig    aspirin (aspirin) 81 MG EC tablet Take 1 tablet by mouth Daily.    atenolol (TENORMIN) 50 MG tablet TAKE 1 AND 1/2 TABLETS     DAILY    atorvastatin (LIPITOR) 80 MG tablet TAKE 1 TABLET DAILY    ibuprofen (ADVIL,MOTRIN) 200 MG tablet Take 1 tablet by mouth As Needed for Mild Pain.    isosorbide mononitrate (IMDUR) 30 MG 24 hr tablet TAKE 1 TABLET DAILY    lisinopril (PRINIVIL,ZESTRIL) 20 MG tablet TAKE 1 TABLET DAILY    nitroglycerin (NITROSTAT) 0.4 MG SL tablet Place 1 tablet under the tongue Every 5 (Five) Minutes As Needed for Chest Pain (Only if SBP Greater Than 100). Take no more than 3 doses in 15 minutes.     No current facility-administered medications on file prior to visit.      HISTORY OF PRESENT ILLNESS     Continued management by thoracic surgery last seen July 31.  Followed for multiple lung nodules with CT of the chest performed July 26, 2023 which showed stability of sub-6 mm micronodules.  1.2 cm posterior right upper lobe nodule centrally calcified.  Repeating in 6 months.    Cardiology: Followed by Dr. Nicholas. Cardiac cath in the past with medication treatment today with aspirin 325mg, imdur 30mg daily, atenolol 50mg BID, and atorvastatin 40mg nightly. Hyperlipidemia well controlled, along with HTN on lisinopril 20mg daily.      Hypertension well-controlled with atenolol and lisinopril 20 mg daily. Denies any chest pain, shortness of air, headache, visual disturbances, lower extremity leg swelling, or heart palpitations.      Hyperlipidemia: Well-controlled with atorvastatin 40 mg daily.  Myalgias, normal liver enzymes.     Blood sugar in prediabetic range with hemoglobin A1c of 5.8.     REVIEW OF SYSTEMS     Constitutional neg except  per HPI   Resp neg  CV neg     PHYSICAL EXAMINATION     Physical Exam  Constitutional  No distress  Cardiovascular Rate  normal . Rhythm: regular . Heart sounds:  normal  Pulmonary/Chest  Effort normal. Breath sounds:  normal  Psychiatric  Alert. Judgment and thought content normal. Mood normal      REVIEWED DATA     Labs:   Lab Results   Component Value Date    GLUCOSE 96 05/15/2023    BUN 18 05/15/2023    CREATININE 0.84 05/15/2023    EGFRRESULT 96.8 05/15/2023    EGFR 91.7 04/11/2022    BCR 21.4 05/15/2023    K 4.9 05/15/2023    CO2 25.5 05/15/2023    CALCIUM 9.4 05/15/2023    PROTENTOTREF 6.9 05/15/2023    ALBUMIN 4.4 05/15/2023    BILITOT 0.8 05/15/2023    AST 20 05/15/2023    ALT 23 05/15/2023     Lab Results   Component Value Date    GLUCOSE 96 05/15/2023    BUN 18 05/15/2023    CREATININE 0.84 05/15/2023    EGFRRESULT 96.8 05/15/2023    EGFR 91.7 04/11/2022    BCR 21.4 05/15/2023    K 4.9 05/15/2023    CO2 25.5 05/15/2023    CALCIUM 9.4 05/15/2023    PROTENTOTREF 6.9 05/15/2023    ALBUMIN 4.4 05/15/2023    BILITOT 0.8 05/15/2023    AST 20 05/15/2023    ALT 23 05/15/2023     Lab Results   Component Value Date    CHOL 124 12/09/2022    CHLPL 113 05/15/2023    TRIG 86 05/15/2023    HDL 29 (L) 05/15/2023    LDL 67 05/15/2023     Lab Results   Component Value Date    TSH 2.690 05/15/2023     Lab Results   Component Value Date    HGBA1C 5.80 (H) 05/15/2023     Brief Urine Lab Results  (Last result in the past 365 days)        Color   Clarity   Blood   Leuk Est   Nitrite   Protein   CREAT   Urine HCG        05/15/23 1005 Yellow  Comment: Urine microscopic not indicated.  REFERENCE RANGE: Yellow, Straw     Clear   Negative   Negative   Negative   Negative                 Lab Results   Component Value Date    PSA 0.483 05/15/2023    PSA 0.482 04/11/2022         Imaging:           Medical Tests:           Summary of old records / correspondence / consultant report:           Request outside records:           *Dragon  dictation used for documentation.

## 2023-11-17 ENCOUNTER — LAB (OUTPATIENT)
Facility: HOSPITAL | Age: 68
End: 2023-11-17
Payer: MEDICARE

## 2023-11-17 LAB
ALBUMIN SERPL-MCNC: 3.8 G/DL (ref 3.5–5.2)
ALBUMIN/GLOB SERPL: 1.4 G/DL
ALP SERPL-CCNC: 78 U/L (ref 39–117)
ALT SERPL W P-5'-P-CCNC: 18 U/L (ref 1–41)
ANION GAP SERPL CALCULATED.3IONS-SCNC: 8.3 MMOL/L (ref 5–15)
AST SERPL-CCNC: 16 U/L (ref 1–40)
BILIRUB SERPL-MCNC: 0.5 MG/DL (ref 0–1.2)
BUN SERPL-MCNC: 14 MG/DL (ref 8–23)
BUN/CREAT SERPL: 12.7 (ref 7–25)
CALCIUM SPEC-SCNC: 8.9 MG/DL (ref 8.6–10.5)
CHLORIDE SERPL-SCNC: 105 MMOL/L (ref 98–107)
CHOLEST SERPL-MCNC: 127 MG/DL (ref 0–200)
CO2 SERPL-SCNC: 24.7 MMOL/L (ref 22–29)
CREAT SERPL-MCNC: 1.1 MG/DL (ref 0.76–1.27)
EGFRCR SERPLBLD CKD-EPI 2021: 74 ML/MIN/1.73
GLOBULIN UR ELPH-MCNC: 2.8 GM/DL
GLUCOSE SERPL-MCNC: 85 MG/DL (ref 65–99)
HBA1C MFR BLD: 5.9 % (ref 4.8–5.6)
HDLC SERPL QL: 3.53
HDLC SERPL-MCNC: 36 MG/DL (ref 40–60)
LDLC SERPL CALC-MCNC: 75 MG/DL (ref 0–100)
POTASSIUM SERPL-SCNC: 4.3 MMOL/L (ref 3.5–5.2)
PROT SERPL-MCNC: 6.6 G/DL (ref 6–8.5)
SODIUM SERPL-SCNC: 138 MMOL/L (ref 136–145)
TRIGL SERPL-MCNC: 79 MG/DL (ref 0–150)
VLDLC SERPL-MCNC: 16 MG/DL (ref 5–40)

## 2023-11-17 PROCEDURE — 83036 HEMOGLOBIN GLYCOSYLATED A1C: CPT | Performed by: NURSE PRACTITIONER

## 2023-11-17 PROCEDURE — 36415 COLL VENOUS BLD VENIPUNCTURE: CPT | Performed by: NURSE PRACTITIONER

## 2023-11-17 PROCEDURE — 80061 LIPID PANEL: CPT | Performed by: NURSE PRACTITIONER

## 2023-11-17 PROCEDURE — 80053 COMPREHEN METABOLIC PANEL: CPT | Performed by: NURSE PRACTITIONER

## 2024-01-29 ENCOUNTER — HOSPITAL ENCOUNTER (OUTPATIENT)
Dept: CT IMAGING | Facility: HOSPITAL | Age: 69
Discharge: HOME | End: 2024-01-29
Payer: MEDICARE

## 2024-01-29 DIAGNOSIS — R91.8 MULTIPLE LUNG NODULES ON CT: ICD-10-CM

## 2024-01-29 PROCEDURE — 71250 CT THORAX DX C-: CPT

## 2024-01-30 ENCOUNTER — TELEPHONE (OUTPATIENT)
Dept: SURGERY | Facility: CLINIC | Age: 69
End: 2024-01-30
Payer: MEDICARE

## 2024-01-30 NOTE — TELEPHONE ENCOUNTER
CALLED PT TO CONFIRM APPT. PT REQUESTED APPT TO BE RESCHEDULED OR MOVED TO VIDEO VISIT. INFORMED PT WE WOULD CALL HIM BACK WHEN AND IF WE CAN MOVE/SWITCH IT. PT WAS AGREEABLE AND STATED UNDERSTANDING

## 2024-01-30 NOTE — TELEPHONE ENCOUNTER
CALLED PT TO INFORM HIM OF HIS UPDATED APPT BEING A VIDEO VISIT. PT WAS AGREEABLE AND STATED UNDERSTANDING

## 2024-01-31 ENCOUNTER — TELEPHONE (OUTPATIENT)
Dept: SURGERY | Facility: CLINIC | Age: 69
End: 2024-01-31
Payer: MEDICARE

## 2024-02-05 ENCOUNTER — TELEPHONE (OUTPATIENT)
Dept: SURGERY | Facility: CLINIC | Age: 69
End: 2024-02-05
Payer: MEDICARE

## 2024-02-05 ENCOUNTER — TELEMEDICINE (OUTPATIENT)
Dept: SURGERY | Facility: CLINIC | Age: 69
End: 2024-02-05
Payer: MEDICARE

## 2024-02-05 DIAGNOSIS — R91.8 MULTIPLE LUNG NODULES ON CT: Primary | ICD-10-CM

## 2024-02-05 PROCEDURE — 1159F MED LIST DOCD IN RCRD: CPT

## 2024-02-05 PROCEDURE — 1160F RVW MEDS BY RX/DR IN RCRD: CPT

## 2024-02-05 PROCEDURE — 99213 OFFICE O/P EST LOW 20 MIN: CPT

## 2024-02-05 NOTE — PROGRESS NOTES
"Chief Complaint  Follow-up lung nodule    Subjective        Meng Pabon presents to [facility] THORACIC SURGERY  History of Present Illness  Mr. Pabon is a very pleasant 68-year-old gentleman who presents today in follow-up for his multiple lung nodules seen on CT imaging.  He has been established with our service since May 2021 for continued monitoring of his multiple lung nodules.  He was last seen in our office on 7/31/2023 and he presents today via telemedicine visit to discuss his most recent CT of the chest. He is without complaints.  He reports his breathing is stable.  He denies any cough, fevers, chills, night sweats, nausea or vomiting.  The patient is a never smoker.  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    Objective   Vital Signs: Deferred secondary to telemedicine visit  There were no vitals taken for this visit.  Estimated body mass index is 32.19 kg/m² as calculated from the following:    Height as of 11/16/23: 180.3 cm (71\").    Weight as of 11/16/23: 105 kg (230 lb 12.8 oz).       Physical Exam deferred secondary to telemedicine visit  Result Review :        Data reviewed : Radiologic studies CT of the chest performed 1/29/2024  Stable 1.4 cm mainly calcified posterior right upper lobe nodule.  Stable scattered additional micronodules elsewhere in the lungs.  Stable mildly enlarged mediastinal lymph nodes.  No pleural effusion.  Coronary calcification.  Visualized upper abdominal structures are unremarkable.  No acute bony abnormalities.           Assessment and Plan     Diagnoses and all orders for this visit:    1. Multiple lung nodules on CT (Primary)  -     CT Chest Without Contrast; Future    I have independent reviewed the CT of the chest performed on 1/29/2024 which demonstrates continued stability.  The 1.4 cm calcified right upper lobe nodule is stable along with the scattered additional micronodules elsewhere in the lungs.  Mildly " enlarged mediastinal lymph nodes are stable as well.  Patient is a never-smoker and serial CTs of the chest have demonstrated stability as remote as a CT performed on 04/2021.  We discussed continued surveillance with a follow-up CT of the chest 6 months versus 1 year.  Patient is electing for 1 year follow-up.  Will see him in follow-up after this is performed.  He has been instructed to reach out prior to this scheduled appointment should he encounter any issues.       I spent 30 minutes caring for Meng on this date of service. This time includes time spent by me in the following activities:preparing for the visit, reviewing tests, obtaining and/or reviewing a separately obtained history, counseling and educating the patient/family/caregiver, ordering medications, tests, or procedures, documenting information in the medical record, and independently interpreting results and communicating that information with the patient/family/caregiver  Follow Up     Return in about 1 year (around 2/5/2025).  Patient was given instructions and counseling regarding his condition or for health maintenance advice. Please see specific information pulled into the AVS if appropriate.

## 2024-02-05 NOTE — TELEPHONE ENCOUNTER
Pt was notified of scheduled ct and provider appointment. Pt was also mailed a reminder for both    DB 2:03  2/5/2024

## 2024-05-16 ENCOUNTER — OFFICE VISIT (OUTPATIENT)
Dept: INTERNAL MEDICINE | Age: 69
End: 2024-05-16
Payer: MEDICARE

## 2024-05-16 VITALS
OXYGEN SATURATION: 97 % | WEIGHT: 229.6 LBS | HEART RATE: 57 BPM | DIASTOLIC BLOOD PRESSURE: 68 MMHG | TEMPERATURE: 98.1 F | BODY MASS INDEX: 32.14 KG/M2 | HEIGHT: 71 IN | SYSTOLIC BLOOD PRESSURE: 118 MMHG

## 2024-05-16 DIAGNOSIS — Z00.00 MEDICARE ANNUAL WELLNESS VISIT, SUBSEQUENT: Primary | ICD-10-CM

## 2024-05-16 DIAGNOSIS — R73.01 IMPAIRED FASTING BLOOD SUGAR: ICD-10-CM

## 2024-05-16 DIAGNOSIS — Z23 NEED FOR VACCINATION: ICD-10-CM

## 2024-05-16 DIAGNOSIS — R10.9 LEFT FLANK PAIN: ICD-10-CM

## 2024-05-16 DIAGNOSIS — E78.2 MIXED HYPERLIPIDEMIA: ICD-10-CM

## 2024-05-16 DIAGNOSIS — I10 ESSENTIAL HYPERTENSION: ICD-10-CM

## 2024-05-16 DIAGNOSIS — R91.8 MULTIPLE LUNG NODULES ON CT: ICD-10-CM

## 2024-05-16 DIAGNOSIS — Z12.5 SCREENING PSA (PROSTATE SPECIFIC ANTIGEN): ICD-10-CM

## 2024-05-16 DIAGNOSIS — I25.10 CORONARY ARTERY DISEASE INVOLVING NATIVE CORONARY ARTERY OF NATIVE HEART WITHOUT ANGINA PECTORIS: ICD-10-CM

## 2024-05-16 DIAGNOSIS — R10.12 LEFT UPPER QUADRANT ABDOMINAL PAIN: ICD-10-CM

## 2024-05-16 DIAGNOSIS — Z80.0 FAMILY HISTORY OF PANCREATIC CANCER: ICD-10-CM

## 2024-05-16 NOTE — LETTER
[facility]  Vaccine Consent Form    Patient Name:  Meng Pabon  Patient :  1955     Vaccine(s) Ordered    Tdap Vaccine => 8yo IM (BOOSTRIX)        Screening Checklist  The following questions should be completed prior to vaccination. If you answer “yes” to any question, it does not necessarily mean you should not be vaccinated. It just means we may need to clarify or ask more questions. If a question is unclear, please ask your healthcare provider to explain it.    Yes No   Any fever or moderate to severe illness today (mild illness and/or antibiotic treatment are not contraindications)?     Do you have a history of a serious reaction to any previous vaccinations, such as anaphylaxis, encephalopathy within 7 days, Guillain-Orlinda syndrome within 6 weeks, seizure?     Have you received any live vaccine(s) (e.g MMR, KEN) or any other vaccines in the last month (to ensure duplicate doses aren't given)?     Do you have an anaphylactic allergy to latex (DTaP, DTaP-IPV, Hep A, Hep B, MenB, RV, Td, Tdap), baker’s yeast (Hep B, HPV), polysorbates (RSV, nirsevimab, PCV 20, Rotavirrus, Tdap, Shingrix), or gelatin (KEN, MMR)?     Do you have an anaphylactic allergy to neomycin (Rabies, KEN, MMR, IPV, Hep A), polymyxin B (IPV), or streptomycin (IPV)?      Any cancer, leukemia, AIDS, or other immune system disorder? (KEN, MMR, RV)     Do you have a parent, brother, or sister with an immune system problem (if immune competence of vaccine recipient clinically verified, can proceed)? (MMR, KEN)     Any recent steroid treatments for >2 weeks, chemotherapy, or radiation treatment? (KEN, MMR)     Have you received antibody-containing blood transfusions or IVIG in the past 11 months (recommended interval is dependent on product)? (MMR, KEN)     Have you taken antiviral drugs (acyclovir, famciclovir, valacyclovir for KEN) in the last 24 or 48 hours, respectively?      Are you pregnant or planning to become pregnant within 1  "month? (KEN, MMR, HPV, IPV, MenB, Abrexvy; For Hep B- refer to Engerix-B; For RSV - Abrysvo is indicated for 32-36 weeks of pregnancy from September to January)     For infants, have you ever been told your child has had intussusception or a medical emergency involving obstruction of the intestine (Rotavirus)? If not for an infant, can skip this question.         *Ordering Physicians/APC should be consulted if \"yes\" is checked by the patient or guardian above.  I have received, read, and understand the Vaccine Information Statement (VIS) for each vaccine ordered.  I have considered my or my child's health status as well as the health status of my close contacts.  I have taken the opportunity to discuss my vaccine questions with my or my child's health care provider.   I have requested that the ordered vaccine(s) be given to me or my child.  I understand the benefits and risks of the vaccines.  I understand that I should remain in the clinic for 15 minutes after receiving the vaccine(s).  _________________________________________________________  Signature of Patient or Parent/Legal Guardian ____________________  Date     "

## 2024-05-16 NOTE — PROGRESS NOTES
I N T E R N A L  M E D I C I N E  [facility]S      ENCOUNTER DATE:  05/16/2024    Meng Pabon / 68 y.o. / male      MEDICARE ANNUAL WELLNESS VISIT       Chief Complaint: Medicare Wellness-subsequent, Hyperlipidemia, Hypertension, and Flank Pain       Patient's general assessment of his health since a year ago:     - Compared to one year ago, he feels his physical health is about the same without significant change.    - Compared to one year ago, he feels his mental health is about the same without significant change.      HPI for other active medical problems:     Cardiology: Followed by Dr. Nciholas.  Patient was last seen February 16, 2023.  Cardiac cath in the past with medication treatment today with aspirin 325mg, imdur 30mg daily, atenolol 50mg BID, and atorvastatin 40mg nightly. Hyperlipidemia well controlled, along with HTN on lisinopril 20mg daily.  It was recommended that he follow-up in 1 year.     Hypertension well-controlled with atenolol and lisinopril 20 mg daily. Denies any chest pain, shortness of air, headache, visual disturbances, lower extremity leg swelling, or heart palpitations.      Hyperlipidemia: Well-controlled with atorvastatin 40 mg daily.  Myalgias, normal liver enzymes. Last LDL 75.     Blood sugar in prediabetic range with hemoglobin A1c of 5.9.    Patient was seen through telemedicine with Tad BAUM for multiple lung nodules on CT.  Chest CT completed January 29, 2024 which demonstrates continued stability.  1.4 cm calcified right upper lobe nodule stable along with scattered additional micronodules elsewhere in the lungs.  Mildly enlarged mediastinal lymph nodes stable as well.  Patient was never smoker.  He has scheduled follow-up CT for January 27, 2025 with subsequent follow-up with thoracic February 3, 2025.    He has been having left flank pain with left upper abdominal pain with family history of pancreatic cancer in brother.  No nausea vomiting or  "changes in stools.  No blood in the urine or urinary frequency or pain with urination.    * The required components of Health Risk Assessment (HRA) that were completed by the patient and/or my staff are contained within this note and in the scanned documents titled \"Health Risk Assessment\" within the media section of the patient's chart in [facility].         HISTORY       Recent Hospitalizations:    Recent hospitalization?: No     If YES, location, date, and diagnoses:     Location:   Date:   Principle Discharge Dx:   Secondary Dx:       Patient Care Team:    Patient Care Team:  Carlos Riley DNP, APRN as PCP - General (Internal Medicine)  Han Nicholas MD as Consulting Physician (Cardiology)  Rosibel Ha APRN as Nurse Practitioner (Nurse Practitioner)  Giovany Mijares MD as Consulting Physician (Orthopedic Surgery)  Tad Ibanez APRN as Nurse Practitioner (Thoracic Surgery)      Allergies:  Patient has no known allergies.    Medications:  Current Outpatient Medications on File Prior to Visit   Medication Sig Dispense Refill    aspirin (aspirin) 81 MG EC tablet Take 1 tablet by mouth Daily.      atenolol (TENORMIN) 50 MG tablet TAKE 1 AND 1/2 TABLETS     DAILY 135 tablet 1    atorvastatin (LIPITOR) 80 MG tablet TAKE 1 TABLET DAILY 90 tablet 3    ibuprofen (ADVIL,MOTRIN) 200 MG tablet Take 1 tablet by mouth As Needed for Mild Pain.      isosorbide mononitrate (IMDUR) 30 MG 24 hr tablet TAKE 1 TABLET DAILY 90 tablet 1    lisinopril (PRINIVIL,ZESTRIL) 20 MG tablet TAKE 1 TABLET DAILY 90 tablet 3    nitroglycerin (NITROSTAT) 0.4 MG SL tablet Place 1 tablet under the tongue Every 5 (Five) Minutes As Needed for Chest Pain (Only if SBP Greater Than 100). Take no more than 3 doses in 15 minutes. 30 tablet 6     No current facility-administered medications on file prior to visit.        No opioid medication identified on active medication list. I have reviewed chart for other potential  high risk medication/s " and harmful drug interactions in the elderly.          PFSH:     The following portions of the patient's history were reviewed and updated as appropriate: Allergies / Current Medications / Past Medical History / Surgical History / Social History / Family History    Problem List:  Patient Active Problem List   Diagnosis    Coronary artery disease involving native coronary artery of native heart without angina pectoris    Chronic total occlusion of coronary artery    Mixed hyperlipidemia    Essential hypertension    Multiple lung nodules on CT       Past Medical History:  Past Medical History:   Diagnosis Date    Allergic     Atypical chest pain     Basal cell carcinoma     15 years prior from 2021    CAD (coronary artery disease)     Chronic total occlusion of coronary artery     Colon polyp     Erectile dysfunction     GERD (gastroesophageal reflux disease)     Hyperlipidemia     Hypertension     Lung nodule        Past Surgical History:  Past Surgical History:   Procedure Laterality Date    CARDIAC CATHETERIZATION      CARDIAC CATHETERIZATION N/A 08/03/2021    Procedure: Coronary angiography;  Surgeon: Han Nicholas MD;  Location:  [facility];  Service: Cardiology;  Laterality: N/A;    CARDIAC CATHETERIZATION N/A 08/03/2021    Procedure: Left Heart Cath;  Surgeon: Han Nicholas MD;  Location:  [facility];  Service: Cardiology;  Laterality: N/A;    CARDIAC CATHETERIZATION N/A 08/03/2021    Procedure: Left ventriculography;  Surgeon: Han Nicholas MD;  Location:  [facility];  Service: Cardiology;  Laterality: N/A;    CARDIAC CATHETERIZATION N/A 08/03/2021    Procedure: Stent MISSY coronary;  Surgeon: Han Nicholas MD;  Location:  [facility];  Service: Cardiology;  Laterality: N/A;    COLONOSCOPY  April 7 2022    CORONARY STENT PLACEMENT      INNER EAR SURGERY      VASECTOMY         Social History:  Social History  "    Socioeconomic History    Marital status:    Tobacco Use    Smoking status: Never    Smokeless tobacco: Never   Vaping Use    Vaping status: Never Used   Substance and Sexual Activity    Alcohol use: Yes     Alcohol/week: 2.0 standard drinks of alcohol     Types: 2 Cans of beer per week     Comment: nightly, caffeine - tea    Drug use: Never    Sexual activity: Yes     Partners: Female     Birth control/protection: Post-menopausal       Family History:  Family History   Problem Relation Age of Onset    Hypertension Father     Skin cancer Father     Heart disease Father     Heart disease Sister     No Known Problems Mother     Pancreatic cancer Brother          PATIENT ASSESSMENT     Vitals:  /68 (BP Location: Left arm, Patient Position: Sitting, Cuff Size: Adult)   Pulse 57   Temp 98.1 °F (36.7 °C) (Temporal)   Ht 180.3 cm (71\")   Wt 104 kg (229 lb 9.6 oz)   SpO2 97%   BMI 32.02 kg/m²   BP Readings from Last 3 Encounters:   05/16/24 118/68   11/16/23 118/72   07/31/23 120/70     Wt Readings from Last 3 Encounters:   05/16/24 104 kg (229 lb 9.6 oz)   11/16/23 105 kg (230 lb 12.8 oz)   07/31/23 103 kg (227 lb)      Body mass index is 32.02 kg/m².    Pain Score    05/16/24 1539   PainSc: 0-No pain         Review of Systems:    Review of Systems  Constitutional neg except per HPI   Resp neg  CV neg     Physical Exam:    Physical Exam  Constitutional  No distress  Cardiovascular Rate  normal . Rhythm: regular . Heart sounds:  normal  Pulmonary/Chest  Effort normal. Breath sounds:  normal  Musc mild tenderness left flank and upper GI  Psychiatric  Alert. Judgment and thought content normal. Mood normal      Reviewed Data:    Labs:   Lab Results   Component Value Date     11/17/2023    K 4.3 11/17/2023    CALCIUM 8.9 11/17/2023    AST 16 11/17/2023    ALT 18 11/17/2023    BUN 14 11/17/2023    CREATININE 1.10 11/17/2023    CREATININE 0.84 05/15/2023    CREATININE 0.93 04/11/2022    EGFRIFNONA 93 " 08/04/2021    EGFRIFAFRI 86 04/13/2021       Lab Results   Component Value Date    HGBA1C 5.90 (H) 11/17/2023    HGBA1C 5.80 (H) 05/15/2023    HGBA1C 5.70 (H) 04/11/2022       Lab Results   Component Value Date    LDL 75 11/17/2023    LDL 67 05/15/2023    LDL 70 12/09/2022    HDL 36 (L) 11/17/2023    TRIG 79 11/17/2023    CHOLHDLRATIO 3.53 11/17/2023       Lab Results   Component Value Date    TSH 2.690 05/15/2023    FREET4 1.30 05/15/2023          Lab Results   Component Value Date    WBC 6.09 05/15/2023    HGB 15.3 05/15/2023    HGB 14.8 04/11/2022    HGB 14.7 08/04/2021     05/15/2023                   Lab Results   Component Value Date    PSA 0.483 05/15/2023    PSA 0.482 04/11/2022       Imaging:          Medical Tests:          Screening for Glaucoma:  Previous screening for glaucoma?: Yes      Hearing Loss Screen:  Finger Rub Hearing Test (right ear): passed  Finger Rub Hearing Test (left ear): passed      Urinary Incontinence Screen:  Episodes of urinary incontinence? : No      Depression Screen:      5/16/2024     3:37 PM   PHQ-2/PHQ-9 Depression Screening   Little Interest or Pleasure in Doing Things 0-->not at all   Feeling Down, Depressed or Hopeless 0-->not at all   PHQ-9: Brief Depression Severity Measure Score 0        PHQ-2: 0 (Not depressed)    PHQ-9: 0 (Negative screening for depression)       FUNCTIONAL, FALL RISK, & COGNITIVE SCREENING (Components below):    DATA:        5/16/2024     3:39 PM   Functional & Cognitive Status   Do you have difficulty preparing food and eating? No   Do you have difficulty bathing yourself, getting dressed or grooming yourself? No   Do you have difficulty using the toilet? No   Do you have difficulty moving around from place to place? No   Do you have trouble with steps or getting out of a bed or a chair? No   Current Diet Limited Junk Food   Dental Exam Not up to date   Eye Exam Not up to date   Exercise (times per week) 5 times per week   Current Exercises  Include Walking;Other   Do you need help using the phone?  No   Are you deaf or do you have serious difficulty hearing?  No   Do you need help to go to places out of walking distance? No   Do you need help shopping? No   Do you need help preparing meals?  No   Do you need help with housework?  No   Do you need help with laundry? No   Do you need help taking your medications? No   Do you need help managing money? No   Do you ever drive or ride in a car without wearing a seat belt? No   Have you felt unusual stress, anger or loneliness in the last month? No   Who do you live with? Spouse   If you need help, do you have trouble finding someone available to you? No   Have you been bothered in the last four weeks by sexual problems? No   Do you have difficulty concentrating, remembering or making decisions? No         A) Assessment of Functional Ability:  (Assessment of ability to perform ADL's (showering/bathing, using toilet, dressing, feeding self, moving self around) and IADL's (use telephone, shop, prepare food, housekeep, do laundry, transport independently, take medications independently, and handle finances)    Degree of functional impairment: NONE (based on assessment noted above)      B) Assessment of Fall Risk:  Fall Risk Assessment was completed, and patient is at low risk for falls.       Need for further evaluation of gait, strength, and balance? : No    Timed Up and Go (TUG):   (>= 12 seconds indicates high risk for falling)    Observable abnormalities included: Normal gait pattern       C. Assessment of Cognitive Function:    Mini-Cog Test:     1) Registration (3 objects): Yes   2) Number of objects recalled: 3   3) Clock Draw: Passed? : Yes       Further evaluation required? : No        COUNSELING       A. Identification of Health Risk Factors:    Risk factors include: cardiovascular risk factors      B. Age-Appropriate Screening Schedule:  (Refer to the list below for future screening recommendations  based on patient's age, sex and/or medical conditions. Orders for these recommended tests are listed in the plan section. The patient has been provided with a written plan)    Health Maintenance Topics  Health Maintenance   Topic Date Due    TDAP/TD VACCINES (1 - Tdap) Never done    RSV Vaccine - Adults (1 - 1-dose 60+ series) Never done    COVID-19 Vaccine (3 - 2023-24 season) 09/01/2023    INFLUENZA VACCINE  08/01/2024    BMI FOLLOWUP  11/16/2024    LIPID PANEL  11/17/2024    COLORECTAL CANCER SCREENING  04/07/2025    ANNUAL WELLNESS VISIT  05/16/2025    HEPATITIS C SCREENING  Completed    Pneumococcal Vaccine 65+  Completed    ZOSTER VACCINE  Completed       Health Maintenance Topics Due or Over-Due  Health Maintenance Due   Topic Date Due    TDAP/TD VACCINES (1 - Tdap) Never done    RSV Vaccine - Adults (1 - 1-dose 60+ series) Never done    COVID-19 Vaccine (3 - 2023-24 season) 09/01/2023         C. Advanced Care Planning:    Advance Care Planning   ACP discussion was held with the patient during this visit. Patient does not have an advance directive, declines further assistance.       D. Patient Self-Management and Personalized Health Advice:    He has been provided with personalized counseling/information (including brochures/handouts) about:     -- optimizing diet/nutrition plans, improving exercise / conditioning, and reducing risk for cardiovascular disease (heart, stroke, vascular)      He has been recommended for the following preventative services which has been performed today, will be ordered today or ordered/performed on upcoming follow-up visit:     -- NUTRITION counseling provided, EXERCISE counseling provided, EYE exam for glaucoma screening recommended, CARDIOVASCULAR disease risk reduction counseling performed, FALL RISK assessment / plan of care completed, URINARY incontinence assessment done, PROSTATE CANCER screening (discussed and PSA ordered +/- LUDMILA performed), **COLORECTAL cancer screening  (colonoscopy/Cologuard discussed or ordered), vaccination for Tdap / Td administered/recommended/discussed       E. Miscellaneous Items:    -Aspirin use counseling: Taking ASA appropriately as indicated    -Discussed BMI with him. The BMI is above average; BMI management plan is completed (discussed plans for weight loss)    -Reviewed use of high risk medication in the elderly: YES    -Reviewed for potential of harmful drug interactions in the elderly: YES        WRAP UP       Assessment & Plan:    1) MEDICARE ANNUAL WELLNESS VISIT    2) OTHER MEDICAL CONDITIONS ADDRESSED TODAY:            Problem List Items Addressed This Visit       Coronary artery disease involving native coronary artery of native heart without angina pectoris    Relevant Medications    aspirin (aspirin) 81 MG EC tablet    nitroglycerin (NITROSTAT) 0.4 MG SL tablet    atenolol (TENORMIN) 50 MG tablet    isosorbide mononitrate (IMDUR) 30 MG 24 hr tablet    Other Relevant Orders    Comprehensive Metabolic Panel    CBC w AUTO Differential    Lipid Panel With / Chol / HDL Ratio    Hemoglobin A1c    Mixed hyperlipidemia    Relevant Medications    atorvastatin (LIPITOR) 80 MG tablet    Other Relevant Orders    Comprehensive Metabolic Panel    Lipid Panel With / Chol / HDL Ratio    Essential hypertension    Relevant Medications    lisinopril (PRINIVIL,ZESTRIL) 20 MG tablet    atenolol (TENORMIN) 50 MG tablet    Other Relevant Orders    Comprehensive Metabolic Panel    CBC w AUTO Differential    TSH+Free T4    Urinalysis With Microscopic If Indicated (No Culture) - Urine, Clean Catch    Multiple lung nodules on CT     Other Visit Diagnoses       Medicare annual wellness visit, subsequent    -  Primary    Need for vaccination        Relevant Orders    Tdap Vaccine => 6yo IM (BOOSTRIX)    Impaired fasting blood sugar        Relevant Orders    Hemoglobin A1c    Screening PSA (prostate specific antigen)        Relevant Orders    PSA Screen    Left flank pain         Relevant Orders    CT Abdomen Pelvis With Contrast    Left upper quadrant abdominal pain        Relevant Orders    CT Abdomen Pelvis With Contrast    Family history of pancreatic cancer        Relevant Orders    CT Abdomen Pelvis With Contrast                      Orders Placed This Encounter   Procedures    CT Abdomen Pelvis With Contrast    Tdap Vaccine => 6yo IM (BOOSTRIX)    Comprehensive Metabolic Panel    TSH+Free T4    Lipid Panel With / Chol / HDL Ratio    Hemoglobin A1c    PSA Screen    Urinalysis With Microscopic If Indicated (No Culture) - Urine, Clean Catch    CBC w AUTO Differential       Discussion / Summary:  Patient will continue all specialty management.  Order for CT abdomen pelvis with family history of pancreatic cancer along with left flank and abdominal pain  Continue current medication regimen for hypertension and hyperlipidemia.  Follow-up in 6 months for chronic medical, 1 year Medicare wellness, earlier depending on imaging      Medications as of TODAY:              Current Outpatient Medications   Medication Sig Dispense Refill    aspirin (aspirin) 81 MG EC tablet Take 1 tablet by mouth Daily.      atenolol (TENORMIN) 50 MG tablet TAKE 1 AND 1/2 TABLETS     DAILY 135 tablet 1    atorvastatin (LIPITOR) 80 MG tablet TAKE 1 TABLET DAILY 90 tablet 3    ibuprofen (ADVIL,MOTRIN) 200 MG tablet Take 1 tablet by mouth As Needed for Mild Pain.      isosorbide mononitrate (IMDUR) 30 MG 24 hr tablet TAKE 1 TABLET DAILY 90 tablet 1    lisinopril (PRINIVIL,ZESTRIL) 20 MG tablet TAKE 1 TABLET DAILY 90 tablet 3    nitroglycerin (NITROSTAT) 0.4 MG SL tablet Place 1 tablet under the tongue Every 5 (Five) Minutes As Needed for Chest Pain (Only if SBP Greater Than 100). Take no more than 3 doses in 15 minutes. 30 tablet 6    Tetanus-Diphth-Acell Pertussis (BOOSTRIX) 5-2.5-18.5 LF-MCG/0.5 injection Inject  into the appropriate muscle as directed by prescriber. 0.5 mL 0     No current  facility-administered medications for this visit.     FOLLOW-UP:            Return in about 6 months (around 11/16/2024) for Next scheduled follow up; 1 year medicare wellness .                 Future Appointments   Date Time Provider Department Center   5/17/2024  3:30 PM [facility]  [facility]   11/22/2024  3:45 PM Carlos Riley DNP, APRN [facility]  J[facility]   1/27/2025  1:00 PM [facility] CT 1 [facility]UNIOR CT BR None   2/3/2025 10:00 AM Tad Ibanez APRN [facility]       After Visit Summary (AVS) including the Personalized Prevention  Plan Services (PPPS) was either printed and given to the patient at check-out today and/or sent to [facility] for review.     *Dragon dictation used for documentation.

## 2024-05-17 LAB
ALBUMIN SERPL-MCNC: 4.2 G/DL (ref 3.5–5.2)
ALBUMIN/GLOB SERPL: 1.6 G/DL
ALP SERPL-CCNC: 78 U/L (ref 39–117)
ALT SERPL-CCNC: 20 U/L (ref 1–41)
APPEARANCE UR: CLEAR
AST SERPL-CCNC: 23 U/L (ref 1–40)
BASOPHILS # BLD AUTO: 0.02 10*3/MM3 (ref 0–0.2)
BASOPHILS NFR BLD AUTO: 0.4 % (ref 0–1.5)
BILIRUB SERPL-MCNC: 0.7 MG/DL (ref 0–1.2)
BILIRUB UR QL STRIP: NEGATIVE
BUN SERPL-MCNC: 17 MG/DL (ref 8–23)
BUN/CREAT SERPL: 17.9 (ref 7–25)
CALCIUM SERPL-MCNC: 8.8 MG/DL (ref 8.6–10.5)
CHLORIDE SERPL-SCNC: 104 MMOL/L (ref 98–107)
CHOLEST SERPL-MCNC: 114 MG/DL (ref 0–200)
CHOLEST/HDLC SERPL: 3.35 {RATIO}
CO2 SERPL-SCNC: 25.4 MMOL/L (ref 22–29)
COLOR UR: YELLOW
CREAT SERPL-MCNC: 0.95 MG/DL (ref 0.76–1.27)
EGFRCR SERPLBLD CKD-EPI 2021: 88.3 ML/MIN/1.73
EOSINOPHIL # BLD AUTO: 0.08 10*3/MM3 (ref 0–0.4)
EOSINOPHIL NFR BLD AUTO: 1.6 % (ref 0.3–6.2)
ERYTHROCYTE [DISTWIDTH] IN BLOOD BY AUTOMATED COUNT: 13 % (ref 12.3–15.4)
GLOBULIN SER CALC-MCNC: 2.6 GM/DL
GLUCOSE SERPL-MCNC: 93 MG/DL (ref 65–99)
GLUCOSE UR QL STRIP: NEGATIVE
HBA1C MFR BLD: 5.7 % (ref 4.8–5.6)
HCT VFR BLD AUTO: 45.9 % (ref 37.5–51)
HDLC SERPL-MCNC: 34 MG/DL (ref 40–60)
HGB BLD-MCNC: 15.2 G/DL (ref 13–17.7)
HGB UR QL STRIP: NEGATIVE
IMM GRANULOCYTES # BLD AUTO: 0.01 10*3/MM3 (ref 0–0.05)
IMM GRANULOCYTES NFR BLD AUTO: 0.2 % (ref 0–0.5)
KETONES UR QL STRIP: NEGATIVE
LDLC SERPL CALC-MCNC: 67 MG/DL (ref 0–100)
LEUKOCYTE ESTERASE UR QL STRIP: NEGATIVE
LYMPHOCYTES # BLD AUTO: 1.92 10*3/MM3 (ref 0.7–3.1)
LYMPHOCYTES NFR BLD AUTO: 38.6 % (ref 19.6–45.3)
MCH RBC QN AUTO: 28.7 PG (ref 26.6–33)
MCHC RBC AUTO-ENTMCNC: 33.1 G/DL (ref 31.5–35.7)
MCV RBC AUTO: 86.8 FL (ref 79–97)
MONOCYTES # BLD AUTO: 0.56 10*3/MM3 (ref 0.1–0.9)
MONOCYTES NFR BLD AUTO: 11.3 % (ref 5–12)
NEUTROPHILS # BLD AUTO: 2.38 10*3/MM3 (ref 1.7–7)
NEUTROPHILS NFR BLD AUTO: 47.9 % (ref 42.7–76)
NITRITE UR QL STRIP: NEGATIVE
NRBC BLD AUTO-RTO: 0 /100 WBC (ref 0–0.2)
PH UR STRIP: 6.5 [PH] (ref 5–8)
PLATELET # BLD AUTO: 242 10*3/MM3 (ref 140–450)
POTASSIUM SERPL-SCNC: 4.6 MMOL/L (ref 3.5–5.2)
PROT SERPL-MCNC: 6.8 G/DL (ref 6–8.5)
PROT UR QL STRIP: NEGATIVE
PSA SERPL-MCNC: 0.53 NG/ML (ref 0–4)
RBC # BLD AUTO: 5.29 10*6/MM3 (ref 4.14–5.8)
SODIUM SERPL-SCNC: 138 MMOL/L (ref 136–145)
SP GR UR STRIP: 1.01 (ref 1–1.03)
T4 FREE SERPL-MCNC: 1.32 NG/DL (ref 0.93–1.7)
TRIGL SERPL-MCNC: 59 MG/DL (ref 0–150)
TSH SERPL DL<=0.005 MIU/L-ACNC: 2.59 UIU/ML (ref 0.27–4.2)
UROBILINOGEN UR STRIP-MCNC: NORMAL MG/DL
VLDLC SERPL CALC-MCNC: 13 MG/DL (ref 5–40)
WBC # BLD AUTO: 4.97 10*3/MM3 (ref 3.4–10.8)

## 2024-06-18 ENCOUNTER — HOSPITAL ENCOUNTER (OUTPATIENT)
Facility: HOSPITAL | Age: 69
Discharge: HOME | End: 2024-06-18
Admitting: NURSE PRACTITIONER
Payer: MEDICARE

## 2024-06-18 DIAGNOSIS — Z80.0 FAMILY HISTORY OF PANCREATIC CANCER: ICD-10-CM

## 2024-06-18 DIAGNOSIS — R10.9 LEFT FLANK PAIN: ICD-10-CM

## 2024-06-18 DIAGNOSIS — R10.12 LEFT UPPER QUADRANT ABDOMINAL PAIN: ICD-10-CM

## 2024-06-18 PROCEDURE — 74177 CT ABD & PELVIS W/CONTRAST: CPT

## 2024-06-18 PROCEDURE — 25510000001 IOPAMIDOL 61 % SOLUTION: Performed by: NURSE PRACTITIONER

## 2024-06-18 RX ADMIN — IOPAMIDOL 100 ML: 612 INJECTION, SOLUTION INTRAVENOUS at 11:52

## 2024-06-19 RX ORDER — ATENOLOL 50 MG/1
75 TABLET ORAL DAILY
Qty: 135 TABLET | Refills: 1 | Status: SHIPPED | OUTPATIENT
Start: 2024-06-19

## 2024-06-19 RX ORDER — ISOSORBIDE MONONITRATE 30 MG/1
30 TABLET, EXTENDED RELEASE ORAL DAILY
Qty: 90 TABLET | Refills: 1 | Status: SHIPPED | OUTPATIENT
Start: 2024-06-19

## 2024-08-21 ENCOUNTER — OFFICE VISIT (OUTPATIENT)
Dept: CARDIOLOGY | Facility: CLINIC | Age: 69
End: 2024-08-21
Payer: MEDICARE

## 2024-08-21 VITALS
DIASTOLIC BLOOD PRESSURE: 84 MMHG | WEIGHT: 228.4 LBS | BODY MASS INDEX: 31.98 KG/M2 | HEART RATE: 55 BPM | SYSTOLIC BLOOD PRESSURE: 128 MMHG | HEIGHT: 71 IN

## 2024-08-21 DIAGNOSIS — I10 ESSENTIAL HYPERTENSION: ICD-10-CM

## 2024-08-21 DIAGNOSIS — E78.2 MIXED HYPERLIPIDEMIA: ICD-10-CM

## 2024-08-21 DIAGNOSIS — I25.82 CHRONIC TOTAL OCCLUSION OF CORONARY ARTERY: ICD-10-CM

## 2024-08-21 DIAGNOSIS — I25.10 CORONARY ARTERY DISEASE INVOLVING NATIVE CORONARY ARTERY OF NATIVE HEART WITHOUT ANGINA PECTORIS: Primary | ICD-10-CM

## 2024-08-21 PROCEDURE — 93000 ELECTROCARDIOGRAM COMPLETE: CPT | Performed by: NURSE PRACTITIONER

## 2024-08-21 PROCEDURE — 3079F DIAST BP 80-89 MM HG: CPT | Performed by: NURSE PRACTITIONER

## 2024-08-21 PROCEDURE — 99214 OFFICE O/P EST MOD 30 MIN: CPT | Performed by: NURSE PRACTITIONER

## 2024-08-21 PROCEDURE — 3074F SYST BP LT 130 MM HG: CPT | Performed by: NURSE PRACTITIONER

## 2024-08-21 RX ORDER — ISOSORBIDE MONONITRATE 30 MG/1
30 TABLET, EXTENDED RELEASE ORAL DAILY
Qty: 90 TABLET | Refills: 3 | Status: SHIPPED | OUTPATIENT
Start: 2024-08-21

## 2024-08-21 RX ORDER — NITROGLYCERIN 0.4 MG/1
0.4 TABLET SUBLINGUAL
Qty: 30 TABLET | Refills: 6 | Status: SHIPPED | OUTPATIENT
Start: 2024-08-21

## 2024-08-21 RX ORDER — ATENOLOL 50 MG/1
75 TABLET ORAL DAILY
Qty: 135 TABLET | Refills: 3 | Status: SHIPPED | OUTPATIENT
Start: 2024-08-21

## 2024-08-21 NOTE — PROGRESS NOTES
Date of Office Visit: 2024  Encounter Provider: SARIKA Sims  Place of Service: [facility]  Patient Name: Meng Pabon  :1955    No chief complaint on file.  : coronary artery disease    HPI: Meng Pabon is a 69 y.o. male who is a patient of  Dr. Nicholas.  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  He has a history of coronary artery disease, hypertension, hyperlipidemia.  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  A nuclear stress test was ordered for further evaluation.  This revealed a small mildly severe area of ischemia at the apex and inferior wall with EKG changes during stress.  He ultimately underwent cardiac cath revealing 90% lesion of proximal to mid circumflex status post MISSY.  He also has known  of mid LAD with right to left collaterals being treated medically.    Last office visit, atorvastatin was increased to 80 mg as LDL was slightly elevated on lipid panel in .  Current lipid panel is in target range with LDL of 67.  Today with no complaints of chest pain, pressure, shortness of breath or lightheadedness.  No palpitations or edema.  He is euvolemic on physical exam.  Blood pressure well-controlled.  EKG stable.  Does not have diabetes.  He does not smoke.    Previous testing and notes have been reviewed by me.   Past Medical History:   Diagnosis Date    Allergic     Atypical chest pain     Basal cell carcinoma     15 years prior from     CAD (coronary artery disease)     Chronic total occlusion of coronary artery     Colon polyp     Erectile dysfunction     GERD (gastroesophageal reflux disease)     Hyperlipidemia     Hypertension     Lung nodule        Past Surgical History:   Procedure Laterality Date    CARDIAC CATHETERIZATION      CARDIAC CATHETERIZATION N/A 2021    Procedure: Coronary angiography;  Surgeon: Han Nicholas  MD JEFFERY;  Location:  [facility];  Service: Cardiology;  Laterality: N/A;    CARDIAC CATHETERIZATION N/A 08/03/2021    Procedure: Left Heart Cath;  Surgeon: Han Nicholas MD;  Location:  [facility]VAS[facility] LOCATION;  Service: Cardiology;  Laterality: N/A;    CARDIAC CATHETERIZATION N/A 08/03/2021    Procedure: Left ventriculography;  Surgeon: Han Nicholas MD;  Location:  [facility]VAS[facility] LOCATION;  Service: Cardiology;  Laterality: N/A;    CARDIAC CATHETERIZATION N/A 08/03/2021    Procedure: Stent MISSY coronary;  Surgeon: Han Nicholas MD;  Location:  [facility] CATH [facility]VAS[facility] LOCATION;  Service: Cardiology;  Laterality: N/A;    COLONOSCOPY  April 7 2022    CORONARY STENT PLACEMENT      INNER EAR SURGERY      VASECTOMY         Social History     Socioeconomic History    Marital status:    Tobacco Use    Smoking status: Never    Smokeless tobacco: Never   Vaping Use    Vaping status: Never Used   Substance and Sexual Activity    Alcohol use: Yes     Alcohol/week: 2.0 standard drinks of alcohol     Types: 2 Cans of beer per week     Comment: nightly, caffeine - tea    Drug use: Never    Sexual activity: Yes     Partners: Female     Birth control/protection: Post-menopausal       Family History   Problem Relation Age of Onset    Hypertension Father     Skin cancer Father     Heart disease Father     Heart disease Sister     No Known Problems Mother     Pancreatic cancer Brother        Review of Systems   Constitutional: Negative.   HENT: Negative.     Eyes: Negative.    Cardiovascular: Negative.    Respiratory: Negative.     Endocrine: Negative.    Hematologic/Lymphatic: Negative.    Skin: Negative.    Musculoskeletal: Negative.    Gastrointestinal: Negative.    Genitourinary: Negative.    Neurological: Negative.    Psychiatric/Behavioral: Negative.     Allergic/Immunologic: Negative.        No Known Allergies      Current Outpatient Medications:     aspirin (aspirin) 81  MG EC tablet, Take 1 tablet by mouth Daily., Disp: , Rfl:     atenolol (TENORMIN) 50 MG tablet, Take 1.5 tablets by mouth Daily., Disp: 135 tablet, Rfl: 1    atorvastatin (LIPITOR) 80 MG tablet, TAKE 1 TABLET DAILY, Disp: 90 tablet, Rfl: 3    ibuprofen (ADVIL,MOTRIN) 200 MG tablet, Take 1 tablet by mouth As Needed for Mild Pain., Disp: , Rfl:     isosorbide mononitrate (IMDUR) 30 MG 24 hr tablet, Take 1 tablet by mouth Daily., Disp: 90 tablet, Rfl: 1    lisinopril (PRINIVIL,ZESTRIL) 20 MG tablet, TAKE 1 TABLET DAILY, Disp: 90 tablet, Rfl: 3    nitroglycerin (NITROSTAT) 0.4 MG SL tablet, Place 1 tablet under the tongue Every 5 (Five) Minutes As Needed for Chest Pain (Only if SBP Greater Than 100). Take no more than 3 doses in 15 minutes., Disp: 30 tablet, Rfl: 6    Tetanus-Diphth-Acell Pertussis (BOOSTRIX) 5-2.5-18.5 LF-MCG/0.5 injection, Inject  into the appropriate muscle as directed by prescriber., Disp: 0.5 mL, Rfl: 0      Objective:     There were no vitals filed for this visit.  There is no height or weight on file to calculate BMI.    Treadmill Stress Test 10/12/2021:  No ECG evidence of myocardial ischemia.  Arrhythmias during stress: occasional PVCs, non-sustained VT 3 beats noted during stress, couplets.  Arrhythmias during recovery: occasional PAC's, rare PVC's, sustained VT 3 beats noted during recovery. Recovery arrhythmias comment: Pt had a 3 beat of a idioventricular rhythm in recovery.  Ventricular ectopy noted seems to have worsened with exercise.    Left Heart Cath 08/03/2021:  1. Left main: Normal  2. LAD: Chronic total occlusion mid segment.  Discrete 50 to 60% proximal diagonal stenosis.  LAD fills via right to left collaterals.  3. LCX: Ectatic proximal vessel with a calcified discrete 90% proximal stenosis.  Discrete 50% mid to distal stenosis.  4. RCA: Calcified vessel.  Diffuse 10 to 20% mid vessel stenosis  5.  Normal left ventricular size and systolic function with mild anterolateral wall  hypokinesis distally.  5.  Successful PCI of the proximal to mid circumflex with a 3.5 x 23 mm Xience Vanna drug-eluting stent, postdilated to high pressure with a 3.75 mm NC trek balloon.    Stress Perfusion Study 07/26/2021:  Myocardial perfusion imaging indicates a small-sized, mildly severe area of ischemia located in the apex and inferior wall.  Left ventricular ejection fraction is normal. (Calculated EF = 61%).  Moderate risk for ischemic heart disease.  An upward-sloping ST segment depression of 1 mm was noted during stress, beginning at 5 minutes of stress.    2D Echocardiogram 07/27/2021:  Calculated left ventricular EF = 66.3% Estimated left ventricular EF was in agreement with the calculated left ventricular EF. Left ventricular systolic function is normal.  Left ventricular diastolic function was normal.  No significant valvular stenosis or regurgitation noted.    Treadmill Stress Test 07/13/2021:  There were very frequent unifocal PVC, including frequent bigeminy and frequent ventricular couplets  A horizontal ST segment depression of 1 mm in the inferior and inferolateral leads was noted during stress (II, III, aVF, V5 and V6).  ECG evidence of myocardial ischemia.Negative clinical evidence of myocardial ischemia. Findings consistent with an abnormal ECG stress test.  Echocardiogram and nuclear stress test arranged.     PHYSICAL EXAM:    Constitutional:       Appearance: Healthy appearance. Not in distress.   Neck:      Vascular: No JVR. JVD normal.   Pulmonary:      Effort: Pulmonary effort is normal.      Breath sounds: Normal breath sounds. No wheezing. No rhonchi. No rales.   Chest:      Chest wall: Not tender to palpatation.   Cardiovascular:      PMI at left midclavicular line. Normal rate. Regular rhythm. Normal S1. Normal S2.       Murmurs: There is no murmur.      No gallop.  No click. No rub.   Pulses:     Intact distal pulses.   Edema:     Peripheral edema absent.   Abdominal:       General: Bowel sounds are normal.      Palpations: Abdomen is soft.      Tenderness: There is no abdominal tenderness.   Musculoskeletal: Normal range of motion.         General: No tenderness. Skin:     General: Skin is warm and dry.   Neurological:      General: No focal deficit present.      Mental Status: Alert and oriented to person, place and time.           ECG 12 Lead    Date/Time: 8/21/2024 10:02 AM  Performed by: Enid Delgado APRN    Authorized by: Enid Delgado APRN  Comparison: compared with previous ECG from 2/1/2023  Similar to previous ECG  Rhythm: sinus rhythm  Rate: normal  BPM: 55  Q waves: I and II    T inversion: V1  Other findings: early transition            Assessment:       Diagnosis Plan   1. Coronary artery disease involving native coronary artery of native heart without angina pectoris        2. Chronic total occlusion of coronary artery        3. Mixed hyperlipidemia        4. Essential hypertension          No orders of the defined types were placed in this encounter.         Plan:       Coronary artery disease: History of PCI/MISSY in 2021.  of mid LAD with right to left collaterals being treated medically.  On aspirin, statin, long acting nitrate and beta-blocker.  No angina.  Stable EKG.   Hypertension: Well-controlled on current medication  Hyperlipidemia goal LDL less than 70: Lipid panel in target range.  On high-dose statin.    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 Mr. Pabon will follow up with Dr. Nicholas in 6 months.  He will call sooner for any questions or concerns.          Your medication list            Accurate as of August 21, 2024  8:08 AM. If you have any questions, ask your nurse or doctor.                CONTINUE taking these medications        Instructions Last Dose Given Next Dose Due   aspirin 81 MG EC tablet      Take 1 tablet by mouth Daily.       atenolol 50 MG tablet  Commonly known as: TENORMIN      Take 1.5 tablets by mouth Daily.        atorvastatin 80 MG tablet  Commonly known as: LIPITOR      TAKE 1 TABLET DAILY       Boostrix 5-2.5-18.5 LF-MCG/0.5 injection  Generic drug: Tetanus-Diphth-Acell Pertussis      Inject  into the appropriate muscle as directed by prescriber.       ibuprofen 200 MG tablet  Commonly known as: ADVIL,MOTRIN      Take 1 tablet by mouth As Needed for Mild Pain.       isosorbide mononitrate 30 MG 24 hr tablet  Commonly known as: IMDUR      Take 1 tablet by mouth Daily.       lisinopril 20 MG tablet  Commonly known as: PRINIVIL,ZESTRIL      TAKE 1 TABLET DAILY       nitroglycerin 0.4 MG SL tablet  Commonly known as: NITROSTAT      Place 1 tablet under the tongue Every 5 (Five) Minutes As Needed for Chest Pain (Only if SBP Greater Than 100). Take no more than 3 doses in 15 minutes.                  As always, it has been a pleasure to participate in your patient's care.      Sincerely,       SARIKA Guzmán

## 2024-08-23 ENCOUNTER — TELEPHONE (OUTPATIENT)
Dept: CARDIOLOGY | Facility: CLINIC | Age: 69
End: 2024-08-23
Payer: MEDICARE

## 2024-08-26 ENCOUNTER — HOSPITAL ENCOUNTER (OUTPATIENT)
Dept: CARDIOLOGY | Facility: HOSPITAL | Age: 69
Discharge: HOME | End: 2024-08-26
Admitting: NURSE PRACTITIONER
Payer: MEDICARE

## 2024-08-26 DIAGNOSIS — I10 ESSENTIAL HYPERTENSION: ICD-10-CM

## 2024-08-26 DIAGNOSIS — E78.2 MIXED HYPERLIPIDEMIA: ICD-10-CM

## 2024-08-26 DIAGNOSIS — I25.10 CORONARY ARTERY DISEASE INVOLVING NATIVE CORONARY ARTERY OF NATIVE HEART WITHOUT ANGINA PECTORIS: ICD-10-CM

## 2024-08-26 DIAGNOSIS — I25.82 CHRONIC TOTAL OCCLUSION OF CORONARY ARTERY: ICD-10-CM

## 2024-08-26 PROCEDURE — 93017 CV STRESS TEST TRACING ONLY: CPT

## 2024-08-27 ENCOUNTER — TELEPHONE (OUTPATIENT)
Dept: CARDIOLOGY | Facility: CLINIC | Age: 69
End: 2024-08-27
Payer: MEDICARE

## 2024-08-27 LAB
BH CV STRESS BP STAGE 1: NORMAL
BH CV STRESS DURATION MIN STAGE 1: 3
BH CV STRESS DURATION MIN STAGE 2: 1
BH CV STRESS DURATION SEC STAGE 1: 0
BH CV STRESS DURATION SEC STAGE 2: 15
BH CV STRESS GRADE STAGE 1: 10
BH CV STRESS GRADE STAGE 2: 12
BH CV STRESS HR STAGE 1: 148
BH CV STRESS HR STAGE 2: 164
BH CV STRESS METS STAGE 1: 5
BH CV STRESS METS STAGE 2: 6
BH CV STRESS PROTOCOL 1: NORMAL
BH CV STRESS RECOVERY BP: NORMAL MMHG
BH CV STRESS RECOVERY HR: 85 BPM
BH CV STRESS SPEED STAGE 1: 1.7
BH CV STRESS SPEED STAGE 2: 2.5
BH CV STRESS STAGE 1: 1
BH CV STRESS STAGE 2: 2
MAXIMAL PREDICTED HEART RATE: 153 BPM
PERCENT MAX PREDICTED HR: 107.19 %
STRESS BASELINE BP: NORMAL MMHG
STRESS BASELINE HR: 88 BPM
STRESS PERCENT HR: 126 %
STRESS POST ESTIMATED WORKLOAD: 6 METS
STRESS POST EXERCISE DUR MIN: 4 MIN
STRESS POST EXERCISE DUR SEC: 15 SEC
STRESS POST PEAK BP: NORMAL MMHG
STRESS POST PEAK HR: 164 BPM
STRESS TARGET HR: 130 BPM

## 2024-08-27 NOTE — TELEPHONE ENCOUNTER
Results called to pt.  Instructed to call with any further questions or concerns.  Verbalized understanding.    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  He would prefer to pick this up at the office when it is available.    Rocio Payton, RN  Triage Nurse, [facility]  08/27/24 09:33 EDT

## 2024-08-27 NOTE — TELEPHONE ENCOUNTER
----- Message from Enid Delgado sent at 8/27/2024  9:19 AM EDT -----  Please call patient and let him know stress test was normal.  Thank you!  ----- Message -----  From: Han Nicholas MD  Sent: 8/27/2024   8:43 AM EDT  To: SARIKA Sims

## 2024-08-28 NOTE — TELEPHONE ENCOUNTER
9/28/24   Letter has been type. I sent to pt in my chart and have copy of letter to   at .   I called pt he is aware copy at  and it has been sent to himin my chart.   John CHATMAN

## 2024-08-29 RX ORDER — ATORVASTATIN CALCIUM 80 MG/1
80 TABLET, FILM COATED ORAL DAILY
Qty: 90 TABLET | Refills: 3 | Status: SHIPPED | OUTPATIENT
Start: 2024-08-29

## 2024-08-29 RX ORDER — LISINOPRIL 20 MG/1
20 TABLET ORAL DAILY
Qty: 90 TABLET | Refills: 3 | Status: SHIPPED | OUTPATIENT
Start: 2024-08-29

## 2024-11-22 ENCOUNTER — OFFICE VISIT (OUTPATIENT)
Dept: INTERNAL MEDICINE | Age: 69
End: 2024-11-22
Payer: MEDICARE

## 2024-11-22 ENCOUNTER — HOSPITAL ENCOUNTER (OUTPATIENT)
Facility: HOSPITAL | Age: 69
Discharge: HOME | End: 2024-11-22
Payer: MEDICARE

## 2024-11-22 VITALS
HEIGHT: 71 IN | WEIGHT: 234 LBS | BODY MASS INDEX: 32.76 KG/M2 | DIASTOLIC BLOOD PRESSURE: 68 MMHG | HEART RATE: 64 BPM | SYSTOLIC BLOOD PRESSURE: 130 MMHG | OXYGEN SATURATION: 98 % | TEMPERATURE: 97.7 F

## 2024-11-22 DIAGNOSIS — R73.01 IMPAIRED FASTING BLOOD SUGAR: ICD-10-CM

## 2024-11-22 DIAGNOSIS — M25.572 ACUTE LEFT ANKLE PAIN: ICD-10-CM

## 2024-11-22 DIAGNOSIS — R10.9 LEFT FLANK PAIN: Primary | ICD-10-CM

## 2024-11-22 DIAGNOSIS — E78.5 HYPERLIPIDEMIA, UNSPECIFIED HYPERLIPIDEMIA TYPE: ICD-10-CM

## 2024-11-22 PROCEDURE — 73610 X-RAY EXAM OF ANKLE: CPT

## 2024-11-22 RX ORDER — CYCLOBENZAPRINE HCL 10 MG
10 TABLET ORAL 3 TIMES DAILY PRN
Qty: 15 TABLET | Refills: 0 | Status: SHIPPED | OUTPATIENT
Start: 2024-11-22

## 2024-11-22 RX ORDER — KETOCONAZOLE 20 MG/G
1 CREAM TOPICAL DAILY
COMMUNITY
Start: 2024-09-03

## 2024-11-22 NOTE — PROGRESS NOTES
"    I N T E R N A L  M E D I C I N E  ION MCMANUS, APRN      ENCOUNTER DATE:  11/22/2024    Meng Pabon / 69 y.o. / male      CHIEF COMPLAINT / REASON FOR OFFICE VISIT     Hyperlipidemia, Hypertension, and Flank Pain      ASSESSMENT & PLAN     Problem List Items Addressed This Visit    None  Visit Diagnoses       Left flank pain    -  Primary    Relevant Medications    cyclobenzaprine (FLEXERIL) 10 MG tablet    Other Relevant Orders    Ambulatory Referral to Urology (Completed)    Acute left ankle pain        Relevant Orders    XR Ankle 3+ View Left    Hyperlipidemia, unspecified hyperlipidemia type        Relevant Orders    Lipid Panel With / Chol / HDL Ratio    Comprehensive Metabolic Panel    Impaired fasting blood sugar        Relevant Orders    Hemoglobin A1c          Orders Placed This Encounter   Procedures    XR Ankle 3+ View Left    Hemoglobin A1c    Lipid Panel With / Chol / HDL Ratio    Comprehensive Metabolic Panel    Ambulatory Referral to Urology     New Medications Ordered This Visit   Medications    cyclobenzaprine (FLEXERIL) 10 MG tablet     Sig: Take 1 tablet by mouth 3 (Three) Times a Day As Needed for Muscle Spasms.     Dispense:  15 tablet     Refill:  0       SUMMARY/DISCUSSION  Update x-ray imaging of the left ankle to assess further, may need orthopedic referral  Flexeril as needed for left flank pain but also will evaluate if kidney stones could be the cause referral to urology    Next Appointment with me: Visit date not found    Return in about 6 months (around 5/22/2025) for Medicare Wellness.      VITAL SIGNS     Visit Vitals  /68 (BP Location: Right arm, Patient Position: Sitting)   Pulse 64   Temp 97.7 °F (36.5 °C) (Oral)   Ht 180.3 cm (70.98\")   Wt 106 kg (234 lb)   SpO2 98%   BMI 32.65 kg/m²       Wt Readings from Last 3 Encounters:   11/22/24 106 kg (234 lb)   08/21/24 104 kg (228 lb 6.4 oz)   05/16/24 104 kg (229 lb 9.6 oz)     Body mass index is 32.65 " kg/m².      MEDICATIONS AT THE TIME OF OFFICE VISIT     Current Outpatient Medications on File Prior to Visit   Medication Sig    aspirin (aspirin) 81 MG EC tablet Take 1 tablet by mouth Daily.    atorvastatin (LIPITOR) 80 MG tablet Take 1 tablet by mouth Daily.    ibuprofen (ADVIL,MOTRIN) 200 MG tablet Take 1 tablet by mouth As Needed for Mild Pain.    isosorbide mononitrate (IMDUR) 30 MG 24 hr tablet Take 1 tablet by mouth Daily.    ketoconazole (NIZORAL) 2 % cream Apply 1 Application topically to the appropriate area as directed Daily.    lisinopril (PRINIVIL,ZESTRIL) 20 MG tablet Take 1 tablet by mouth Daily.    nitroglycerin (NITROSTAT) 0.4 MG SL tablet Place 1 tablet under the tongue Every 5 (Five) Minutes As Needed for Chest Pain (Only if SBP Greater Than 100). Take no more than 3 doses in 15 minutes.    atenolol (TENORMIN) 50 MG tablet Take 1.5 tablets by mouth Daily.    [DISCONTINUED] Tetanus-Diphth-Acell Pertussis (BOOSTRIX) 5-2.5-18.5 LF-MCG/0.5 injection Inject  into the appropriate muscle as directed by prescriber.     No current facility-administered medications on file prior to visit.          HISTORY OF PRESENT ILLNESS     Cardiology: Followed by Dr. Nicholas.  Patient was last seen February 2024.  Cardiac cath in the past with medication treatment today with aspirin 325mg, imdur 30mg daily, atenolol 50mg BID, and atorvastatin 40mg nightly. Hyperlipidemia well controlled, along with HTN on lisinopril 20mg daily.  It was recommended that he follow-up in 1 year.     Hypertension well-controlled with atenolol and lisinopril 20 mg daily. Denies any chest pain, shortness of air, headache, visual disturbances, lower extremity leg swelling, or heart palpitations.      Hyperlipidemia: Well-controlled with atorvastatin 40 mg daily.  Myalgias, normal liver enzymes. Last LDL 67.      Blood sugar in prediabetic range with hemoglobin A1c of 5.7.     Patient was seen through telemedicine with Tad BAUM  for multiple lung nodules on CT.  Chest CT completed January 29, 2024 which demonstrates continued stability.  1.4 cm calcified right upper lobe nodule stable along with scattered additional micronodules elsewhere in the lungs.  Mildly enlarged mediastinal lymph nodes stable as well.  Patient was never smoker.  He has scheduled follow-up CT for January 27, 2025 with subsequent follow-up with thoracic February 3, 2025.     He has been having left flank pain with left upper abdominal pain with family history of pancreatic cancer in brother.  No nausea vomiting or changes in stools.  No blood in the urine or urinary frequency or pain with urination.  CT abdomen did show small nonobstructing left kidney stone and diverticulosis without any signs of diverticulitis.    Recent strain of the left ankle, injury when running, twisted ankle.  Now has swelling.  Able to bear weight.    REVIEW OF SYSTEMS     Constitutional neg except per HPI   Resp neg  CV neg   Musc left ankle pain and swelling    PHYSICAL EXAMINATION     Physical Exam  Constitutional  No distress  Cardiovascular Rate  normal . Rhythm: regular . Heart sounds:  normal  Pulmonary/Chest  Effort normal. Breath sounds:  normal  Musc mild tenderness left flank; left ankle pain and swelling  Psychiatric  Alert. Judgment and thought content normal. Mood normal      REVIEWED DATA     Labs:   Lab Results   Component Value Date    GLUCOSE 93 05/17/2024    BUN 17 05/17/2024    CREATININE 0.95 05/17/2024    EGFRRESULT 88.3 05/17/2024    EGFR 74.0 11/17/2023    BCR 17.9 05/17/2024    K 4.6 05/17/2024    CO2 25.4 05/17/2024    CALCIUM 8.8 05/17/2024    PROTENTOTREF 6.8 05/17/2024    ALBUMIN 4.2 05/17/2024    BILITOT 0.7 05/17/2024    AST 23 05/17/2024    ALT 20 05/17/2024     Lab Results   Component Value Date    WBC 4.97 05/17/2024    HGB 15.2 05/17/2024    HCT 45.9 05/17/2024    MCV 86.8 05/17/2024     05/17/2024     Lab Results   Component Value Date    CHOL 127  11/17/2023    CHLPL 114 05/17/2024    TRIG 59 05/17/2024    HDL 34 (L) 05/17/2024    LDL 67 05/17/2024      Lab Results   Component Value Date    TSH 2.590 05/17/2024     Brief Urine Lab Results  (Last result in the past 365 days)        Color   Clarity   Blood   Leuk Est   Nitrite   Protein   CREAT   Urine HCG        05/17/24 0935 Yellow  Comment: Urine microscopic not indicated.  REFERENCE RANGE: Yellow, Straw     Clear   Negative   Negative   Negative   Negative                 Lab Results   Component Value Date    HGBA1C 5.70 (H) 05/17/2024       Imaging:           Medical Tests:           Summary of old records / correspondence / consultant report:           Request outside records:

## 2024-11-24 DIAGNOSIS — M13.872 ALLERGIC ARTHRITIS OF LEFT ANKLE: Primary | ICD-10-CM

## 2024-11-25 NOTE — PROGRESS NOTES
I CALLED PT NO ANSWER. I LEFT VM FOR PT TO CALL OFFICE BACK TO GET SCHEDULED WITH MWM.   CAN YOU PLEASE TRY TO REACH OUT TO PT AGAIN AT A LATER TIME.     NEW PROBLEM - LT ANKLE - XR DONE 11/23/2024

## 2024-11-27 LAB
ALBUMIN SERPL-MCNC: 4 G/DL (ref 3.9–4.9)
ALP SERPL-CCNC: 75 IU/L (ref 44–121)
ALT SERPL-CCNC: 17 IU/L (ref 0–44)
AST SERPL-CCNC: 21 IU/L (ref 0–40)
BILIRUB SERPL-MCNC: 0.6 MG/DL (ref 0–1.2)
BUN SERPL-MCNC: 18 MG/DL (ref 8–27)
BUN/CREAT SERPL: 19 (ref 10–24)
CALCIUM SERPL-MCNC: 8.9 MG/DL (ref 8.6–10.2)
CHLORIDE SERPL-SCNC: 104 MMOL/L (ref 96–106)
CHOLEST SERPL-MCNC: 135 MG/DL (ref 100–199)
CHOLEST/HDLC SERPL: 3.9 RATIO (ref 0–5)
CO2 SERPL-SCNC: 23 MMOL/L (ref 20–29)
CREAT SERPL-MCNC: 0.97 MG/DL (ref 0.76–1.27)
EGFRCR SERPLBLD CKD-EPI 2021: 86 ML/MIN/1.73
GLOBULIN SER CALC-MCNC: 2.8 G/DL (ref 1.5–4.5)
GLUCOSE SERPL-MCNC: 96 MG/DL (ref 70–99)
HBA1C MFR BLD: 6 % (ref 4.8–5.6)
HDLC SERPL-MCNC: 35 MG/DL
LDLC SERPL CALC-MCNC: 85 MG/DL (ref 0–99)
POTASSIUM SERPL-SCNC: 4.8 MMOL/L (ref 3.5–5.2)
PROT SERPL-MCNC: 6.8 G/DL (ref 6–8.5)
SODIUM SERPL-SCNC: 140 MMOL/L (ref 134–144)
TRIGL SERPL-MCNC: 74 MG/DL (ref 0–149)
VLDLC SERPL CALC-MCNC: 15 MG/DL (ref 5–40)

## 2024-12-16 ENCOUNTER — OFFICE VISIT (OUTPATIENT)
Dept: ORTHOPEDIC SURGERY | Facility: CLINIC | Age: 69
End: 2024-12-16
Payer: MEDICARE

## 2024-12-16 VITALS — HEIGHT: 71 IN | TEMPERATURE: 96.4 F | WEIGHT: 230 LBS | BODY MASS INDEX: 32.2 KG/M2

## 2024-12-16 DIAGNOSIS — M19.072 ARTHRITIS OF LEFT ANKLE: ICD-10-CM

## 2024-12-16 DIAGNOSIS — S86.302A INJURY OF PERONEAL TENDON OF LEFT FOOT, INITIAL ENCOUNTER: Primary | ICD-10-CM

## 2024-12-16 PROCEDURE — 99214 OFFICE O/P EST MOD 30 MIN: CPT | Performed by: ORTHOPAEDIC SURGERY

## 2024-12-16 NOTE — PROGRESS NOTES
New Patient Complaint      Patient: Meng Pabon  YOB: 1955 69 y.o. male  Medical Record Number: [id number]    Chief Complaints: My ankle hurts    History of Present Illness: Patient reports an approximate 3-month history of pain in his left ankle.  He does not recall a specific injury but happened when he had gone to [facility] to get a bottle certified.  He reports that since that time he has had intermittent pain in his ankle somewhat laterally and posterior laterally more so than medially.  He uses ice 2 or 3 times a week.  He does not recall any specific remote or current injury.    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        HPI    Allergies: No Known Allergies    Medications:   Current Outpatient Medications on File Prior to Visit   Medication Sig    aspirin (aspirin) 81 MG EC tablet Take 1 tablet by mouth Daily.    atenolol (TENORMIN) 50 MG tablet Take 1.5 tablets by mouth Daily.    atorvastatin (LIPITOR) 80 MG tablet Take 1 tablet by mouth Daily.    cyclobenzaprine (FLEXERIL) 10 MG tablet Take 1 tablet by mouth 3 (Three) Times a Day As Needed for Muscle Spasms.    ibuprofen (ADVIL,MOTRIN) 200 MG tablet Take 1 tablet by mouth As Needed for Mild Pain.    isosorbide mononitrate (IMDUR) 30 MG 24 hr tablet Take 1 tablet by mouth Daily.    ketoconazole (NIZORAL) 2 % cream Apply 1 Application topically to the appropriate area as directed Daily.    lisinopril (PRINIVIL,ZESTRIL) 20 MG tablet Take 1 tablet by mouth Daily.    nitroglycerin (NITROSTAT) 0.4 MG SL tablet Place 1 tablet under the tongue Every 5 (Five) Minutes As Needed for Chest Pain (Only if SBP Greater Than 100). Take no more than 3 doses in 15 minutes.     No current facility-administered medications on file prior to visit.       Past Medical History:   Diagnosis Date    Allergic     Atypical chest pain     Basal cell carcinoma     15 years prior from 2021    CAD (coronary artery disease)      Chronic total occlusion of coronary artery     Colon polyp     Erectile dysfunction     GERD (gastroesophageal reflux disease)     Hyperlipidemia     Hypertension     Lung nodule      Past Surgical History:   Procedure Laterality Date    CARDIAC CATHETERIZATION      CARDIAC CATHETERIZATION N/A 08/03/2021    Procedure: Coronary angiography;  Surgeon: Han Nicholas MD;  Location:  [facility] C[facility]VAS[facility] LOCATION;  Service: Cardiology;  Laterality: N/A;    CARDIAC CATHETERIZATION N/A 08/03/2021    Procedure: Left Heart Cath;  Surgeon: Han Nicholas MD;  Location:  [facility] CATH [facility] LOCATION;  Service: Cardiology;  Laterality: N/A;    CARDIAC CATHETERIZATION N/A 08/03/2021    Procedure: Left ventriculography;  Surgeon: Han Nicholas MD;  Location:  [facility] C[facility] LOCATION;  Service: Cardiology;  Laterality: N/A;    CARDIAC CATHETERIZATION N/A 08/03/2021    Procedure: Stent MISSY coronary;  Surgeon: Han Nicholas MD;  Location:  [facility] C[facility] LOCATION;  Service: Cardiology;  Laterality: N/A;    COLONOSCOPY  April 7 2022    CORONARY STENT PLACEMENT      INNER EAR SURGERY      VASECTOMY       Social History     Occupational History    Not on file   Tobacco Use    Smoking status: Never    Smokeless tobacco: Never   Vaping Use    Vaping status: Never Used   Substance and Sexual Activity    Alcohol use: Yes     Alcohol/week: 3.0 standard drinks of alcohol     Types: 2 Cans of beer, 1 Shots of liquor per week    Drug use: Never    Sexual activity: Yes     Partners: Female     Birth control/protection: Post-menopausal      Social History     Social History Narrative    Not on file     Family History   Problem Relation Age of Onset    Hypertension Father     Skin cancer Father     Heart disease Father     Heart disease Sister     No Known Problems Mother     Pancreatic cancer Brother        Review of Systems: 14 point review of systems performed, positive pertinent findings  "identified in HPI. All remaining systems negative     Review of Systems      Physical Exam:   Vitals:    12/16/24 1439   Temp: 96.4 °F (35.8 °C)   Weight: 104 kg (230 lb)   Height: 180.3 cm (71\")   PainSc:   3   PainLoc: Ankle     Physical Exam   Constitutional: pleasant, well developed   Eyes: sclera non icteric  Hearing : adequate for exam  Cardiovascular: palpable pulses in left foot, left calf/ thigh NT without sign of DVT  Respiratoy: breathing unlabored   Neurological: grossly sensate to LT throughout left LE  Psychiatric: oriented with normal mood and affect.   Lymphatic: No palpable popliteal lymphadenopathy left LE  Skin: intact throughout left leg/foot  Musculoskeletal: On exam he has mild to moderate tenderness palpation on the posterior lateral more so the anterolateral aspect left ankle peroneal tendons mild discomfort medially but no gross instability and some limited motion with mild discomfort.  Physical Exam  Ortho Exam    Radiology:3 views left ankle reviewed on the [email or web address] Application system from 11/23/2024 which show severe arthritic change of the ankle with varus alignment of the talus within the mortise and large anterior spurs as well as posterior spurring.  There    Assessment/Plan: Exacerbation of left ankle arthritis with possible peroneal tendon tear    We discussed treatment going forward and had him fitted with an ASO brace to help stabilize the ankle during ambulation.    Will get an MRI of his left ankle for further evaluation of peroneal tendon tear    Reviewed with him that he does have extensive arthritis of the left ankle and may eventually need ankle replacement.    He is retiring in August and would like to try to manage things at least until then.    Will see him back in 3 to 4 weeks to review MRI assess progress and determine treatment course going forward.  "

## 2025-01-12 ENCOUNTER — HOSPITAL ENCOUNTER (OUTPATIENT)
Facility: HOSPITAL | Age: 70
Discharge: HOME | End: 2025-01-12
Admitting: ORTHOPAEDIC SURGERY
Payer: MEDICARE

## 2025-01-12 DIAGNOSIS — S86.302A INJURY OF PERONEAL TENDON OF LEFT FOOT, INITIAL ENCOUNTER: ICD-10-CM

## 2025-01-12 DIAGNOSIS — M19.072 ARTHRITIS OF LEFT ANKLE: ICD-10-CM

## 2025-01-12 PROCEDURE — 73721 MRI JNT OF LWR EXTRE W/O DYE: CPT

## 2025-01-13 ENCOUNTER — OFFICE VISIT (OUTPATIENT)
Dept: ORTHOPEDIC SURGERY | Facility: CLINIC | Age: 70
End: 2025-01-13
Payer: MEDICARE

## 2025-01-13 VITALS — HEIGHT: 71 IN | TEMPERATURE: 97.8 F | BODY MASS INDEX: 33.46 KG/M2 | WEIGHT: 239 LBS

## 2025-01-13 DIAGNOSIS — S93.402S SPRAIN OF LEFT ANKLE, UNSPECIFIED LIGAMENT, SEQUELA: ICD-10-CM

## 2025-01-13 DIAGNOSIS — M65.972 TENOSYNOVITIS OF LEFT ANKLE: ICD-10-CM

## 2025-01-13 DIAGNOSIS — M67.472 GANGLION OF LEFT ANKLE: ICD-10-CM

## 2025-01-13 DIAGNOSIS — M65.979 PERONEAL TENOSYNOVITIS: ICD-10-CM

## 2025-01-13 DIAGNOSIS — M25.872 IMPINGEMENT SYNDROME OF LEFT ANKLE: ICD-10-CM

## 2025-01-13 DIAGNOSIS — M19.072 ARTHRITIS OF LEFT ANKLE: Primary | ICD-10-CM

## 2025-01-13 PROBLEM — S93.402A SPRAIN OF LEFT ANKLE: Status: ACTIVE | Noted: 2025-01-13

## 2025-01-13 PROCEDURE — 99213 OFFICE O/P EST LOW 20 MIN: CPT | Performed by: ORTHOPAEDIC SURGERY

## 2025-01-13 NOTE — PROGRESS NOTES
"Ankle Follow Up      Patient: Meng Pabon    YOB: 1955 69 y.o. male    Chief Complaints: Ankle brace has helped    History of Present Illness:Patient was seen on 12/16/2024 reporting of an approximate 3-month history of pain in his left ankle.  He did not recall a specific injury but onset of symptoms occurred when he had gone to [facility] to get a bottle certified.  He reported  that since that time he had had intermittent pain in his ankle somewhat laterally and posterior laterally more so than medially.  He was using ice 2 or 3 times a week.  He did not recall any specific remote or current injury.     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       We reviewed treatment at that time and he was fitted with an ASO brace to help stabilize him.  He was sent for MRI for further evaluation of peroneal tendon tear and reviewed that he did have extensive arthritis of the left ankle and may need to consider ankle replacement.  He let me know that he was retiring in August 2025 and wanted to try to manage things at least until then nonoperatively.      Patient is seen back today reporting that the brace helps he is also been using hightop boots intermittently and has diminished and swelling to get some mild aching pain about the ankle.  Pain is rated a 2 out of 10  HPI    ROS: ankle pain  Past Medical History:   Diagnosis Date    Allergic     Atypical chest pain     Basal cell carcinoma     15 years prior from 2021    CAD (coronary artery disease)     Chronic total occlusion of coronary artery     Colon polyp     Erectile dysfunction     GERD (gastroesophageal reflux disease)     Hyperlipidemia     Hypertension     Lung nodule        Physical Exam:   Vitals:    01/13/25 1510   Temp: 97.8 °F (36.6 °C)   TempSrc: Temporal   Weight: 108 kg (239 lb)   Height: 180.3 cm (71\")   PainSc:   2   PainLoc: Ankle     Well developed with normal mood.  " On exam he has trace effusion to the left ankle and mild discomfort with range of motion but without gross restriction.  He had slight tenderness to palpation along the posterolateral aspect of the ankle more so than medially.  I was unable to palpate any mass in the posterior medial hindfoot.  He was able to flex and extend his toes well without exacerbation of pain at the ankle    Radiology: MRI films and report of the left ankle from 1/12/2025 reviewed which show multilobulated cystic changes at the posterior medial hindfoot measuring up to 3 cm in length thought to be benign ganglion.  There was some effacement of the fat of the sinus tarsi.  There is plantar calcaneal spurring with low signal thickening of the proximal plantar fascia    There is chronic appearing deformity of the distal tibia and fibula with anterior posterior osteophyte with tiny degenerative subchondral changes in the posterior distal tibial plafond and posterior lateral talar body    There was abutment of the lateral and plantar talar body and the dorsal anterior process calcaneus    There is mild multilobulated fluid extension into the FHL tendon sheath.  There was large regions of full-thickness chondral loss medial tibial plafond and medial talar dome.  Articular cartilage and subtalar joint.  A very well-maintained.  There is multifocal degenerative change at the midfoot    There is ossification of the deep components of the deltoid    There was thickening and increased signal of the peroneus brevis tendon posterior just distal to the lateral malleolus with tendinopathy with mild abnormal fluid fluid in the peroneal tendon sheath.  Longus was intact      Assessment/Plan: 1.  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  Left lateral hindfoot impingement with sinus tarsitis  3.  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  Chronic ossification left deltoid  5.  Left plantar fasciitis    We  "discussed treatment going forward and main thing that is symptomatic it is the ankle arthritis.    We reviewed operative and nonoperative treatment options and nothing is bothering him enough for surgical treatment at this time and reviewed that I thought he would be a good candidate for ankle replacement if he gets to the point of being refractory to conservative measures.    He said he has a niece who had recommended that he \"stay away\" from ankle replacements but I reviewed with him that Dr. Allen does an excellent job of lesions had good outcomes with patients that I have referred to him    Regardless he does not wish to pursue any surgical treatment at this time.    We discussed nonoperative measures and offered injection to the left ankle which she declined at this time as symptoms were not bothering him all that much    Did prescribe compounding cream for him to apply several times daily and he will continue with use of his brace or hightop boot    If he has recurrent persistent worsening symptoms he will follow-up we will consider injection otherwise mutual agreement I will see him back as needed  "

## 2025-01-27 ENCOUNTER — HOSPITAL ENCOUNTER (OUTPATIENT)
Dept: CT IMAGING | Facility: HOSPITAL | Age: 70
Discharge: HOME | End: 2025-01-27
Payer: MEDICARE

## 2025-01-27 DIAGNOSIS — R91.8 MULTIPLE LUNG NODULES ON CT: ICD-10-CM

## 2025-01-27 PROCEDURE — 71250 CT THORAX DX C-: CPT

## 2025-02-03 ENCOUNTER — TELEMEDICINE (OUTPATIENT)
Dept: SURGERY | Facility: CLINIC | Age: 70
End: 2025-02-03
Payer: MEDICARE

## 2025-02-03 DIAGNOSIS — R91.8 MULTIPLE LUNG NODULES ON CT: Primary | ICD-10-CM

## 2025-02-03 PROCEDURE — 1159F MED LIST DOCD IN RCRD: CPT | Performed by: NURSE PRACTITIONER

## 2025-02-03 PROCEDURE — 99212 OFFICE O/P EST SF 10 MIN: CPT | Performed by: NURSE PRACTITIONER

## 2025-02-03 PROCEDURE — 1160F RVW MEDS BY RX/DR IN RCRD: CPT | Performed by: NURSE PRACTITIONER

## 2025-02-03 NOTE — PROGRESS NOTES
"Chief Complaint  Follow-up lung nodule    Subjective        Meng Pabon presents to [facility]  History of Present Illness    Mr. Pabon is a very pleasant 69-year-old gentleman who presents today in follow-up for his multiple lung nodules seen on CT imaging.  He has been established with our service since May 2021 for continued monitoring of his multiple lung nodules.  He denies new onset pulmonary symptoms. The patient is a never smoker.  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    Objective   Vital Signs: Deferred secondary to telemedicine visit  There were no vitals taken for this visit.  Estimated body mass index is 33.33 kg/m² as calculated from the following:    Height as of 1/13/25: 180.3 cm (71\").    Weight as of 1/13/25: 108 kg (239 lb).       Physical Exam  Constitutional:       General: He is not in acute distress.     Appearance: Normal appearance. He is not ill-appearing.   HENT:      Head: Normocephalic and atraumatic.      Nose: Nose normal.   Pulmonary:      Effort: Pulmonary effort is normal.   Musculoskeletal:      Cervical back: Normal range of motion and neck supple.   Neurological:      General: No focal deficit present.      Mental Status: He is alert.      Motor: No weakness.   Psychiatric:         Mood and Affect: Mood normal.         Thought Content: Thought content normal.        Result Review :      CT chest 27 2025: Calcified granuloma in the right upper lobe measuring 1.4 cm.  There are noncalcified sub-6 mm nodules in the right upper lobe and right middle lobe, unchanged from CT in 2022.  No new or enlarging mediastinal or hilar lymphadenopathy.  There is a right lower paratracheal lymph node 1.6 cm, stable.             Assessment and Plan     Diagnoses and all orders for this visit:    1. Multiple lung nodules on CT (Primary)  -     CT Chest Without Contrast; Future      Mr. Pabon's most recent CT demonstrates a " stable 1.4cm calcified granuloma in the right upper lobe.  Other sub-6 mm nodules are stable as well as his mediastinal/hilar lymphadenopathy.  Recommend to continue surveillance for annual basis and we will follow-up with him once CT is completed next year.  He agrees to the plan.         I spent 21 minutes caring for Meng on this date of service. This time includes time spent by me in the following activities:preparing for the visit, reviewing tests, obtaining and/or reviewing a separately obtained history, counseling and educating the patient/family/caregiver, ordering medications, tests, or procedures, documenting information in the medical record, and independently interpreting results and communicating that information with the patient/family/caregiver  Follow Up     Return in about 1 year (around 2/3/2026) for Next scheduled follow up, Video visit.  Patient was given instructions and counseling regarding his condition or for health maintenance advice. Please see specific information pulled into the AVS if appropriate.       Mode of Visit: Video  Location of patient: -OTHER-: car  Location of provider: +[facility]+  You have chosen to receive care through a telehealth visit.  The patient has signed the video visit consent form.  The visit included audio and video interaction. No technical issues occurred during this visit.

## 2025-04-07 ENCOUNTER — OFFICE VISIT (OUTPATIENT)
Age: 70
End: 2025-04-07
Payer: MEDICARE

## 2025-04-07 VITALS
HEART RATE: 61 BPM | WEIGHT: 235 LBS | DIASTOLIC BLOOD PRESSURE: 84 MMHG | SYSTOLIC BLOOD PRESSURE: 154 MMHG | HEIGHT: 71 IN | BODY MASS INDEX: 32.9 KG/M2

## 2025-04-07 DIAGNOSIS — I25.82 CHRONIC TOTAL OCCLUSION OF CORONARY ARTERY: ICD-10-CM

## 2025-04-07 DIAGNOSIS — I25.10 CORONARY ARTERY DISEASE INVOLVING NATIVE CORONARY ARTERY OF NATIVE HEART WITHOUT ANGINA PECTORIS: Primary | ICD-10-CM

## 2025-04-07 DIAGNOSIS — I10 ESSENTIAL HYPERTENSION: ICD-10-CM

## 2025-04-07 DIAGNOSIS — E78.2 MIXED HYPERLIPIDEMIA: ICD-10-CM

## 2025-04-07 PROCEDURE — 93000 ELECTROCARDIOGRAM COMPLETE: CPT | Performed by: INTERNAL MEDICINE

## 2025-04-07 PROCEDURE — 99214 OFFICE O/P EST MOD 30 MIN: CPT | Performed by: INTERNAL MEDICINE

## 2025-04-07 PROCEDURE — 3079F DIAST BP 80-89 MM HG: CPT | Performed by: INTERNAL MEDICINE

## 2025-04-07 PROCEDURE — 3077F SYST BP >= 140 MM HG: CPT | Performed by: INTERNAL MEDICINE

## 2025-04-07 RX ORDER — LISINOPRIL 40 MG/1
40 TABLET ORAL DAILY
Qty: 90 TABLET | Refills: 3 | Status: SHIPPED | OUTPATIENT
Start: 2025-04-07

## 2025-04-07 NOTE — PROGRESS NOTES
Date of Office Visit: 25    Encounter Provider: Han Nicholas MD  Place of Service: [facility]  Patient Name: Meng Pabon  :1955    Chief complaint  Coronary artery disease with prior PCI of the circumflex and  of the mid LAD with right to left collaterals  Hyperlipidemia    HPI: Meng Pabon is a 69 y.o. male with a medical history of coronary artery disease, hypertension, hyperlipidemia.  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  A nuclear stress test was ordered for further evaluation.  This revealed a small mildly severe area of ischemia at the apex and inferior wall with EKG changes during stress.  He ultimately underwent cardiac cath revealing 90% lesion of proximal to mid circumflex and underwent PCI of that vessel..  He also has known  of mid LAD with right to left collaterals being treated medically.  Is been doing well as of late but his blood pressure has been elevated.  Is currently 154/84.  He denies any recent issues with chest pain.      Previous testing and notes have been reviewed by me.     Past Medical History:   Diagnosis Date    Allergic     Atypical chest pain     Basal cell carcinoma     15 years prior from     CAD (coronary artery disease)     Chronic total occlusion of coronary artery     Colon polyp     Erectile dysfunction     GERD (gastroesophageal reflux disease)     Hyperlipidemia     Hypertension     Lung nodule        Past Surgical History:   Procedure Laterality Date    CARDIAC CATHETERIZATION      CARDIAC CATHETERIZATION N/A 2021    Procedure: Coronary angiography;  Surgeon: Han Nicholas MD;  Location:  [facility];  Service: Cardiology;  Laterality: N/A;    CARDIAC CATHETERIZATION N/A 2021    Procedure: Left Heart Cath;  Surgeon: Han Nicholas MD;  Location:  [facility];  Service: Cardiology;  Laterality: N/A;    CARDIAC CATHETERIZATION N/A 08/03/2021    Procedure: Left ventriculography;  Surgeon: Han Nicholas MD;  Location:  [facility];  Service: Cardiology;  Laterality: N/A;    CARDIAC CATHETERIZATION N/A 08/03/2021    Procedure: Stent MISSY coronary;  Surgeon: Han Nicholas MD;  Location:  [facility];  Service: Cardiology;  Laterality: N/A;    COLONOSCOPY  April 7 2022    CORONARY STENT PLACEMENT      INNER EAR SURGERY      VASECTOMY         Social History     Socioeconomic History    Marital status:    Tobacco Use    Smoking status: Never    Smokeless tobacco: Never   Vaping Use    Vaping status: Never Used   Substance and Sexual Activity    Alcohol use: Yes     Alcohol/week: 3.0 standard drinks of alcohol     Types: 2 Cans of beer, 1 Shots of liquor per week    Drug use: Never    Sexual activity: Yes     Partners: Female     Birth control/protection: Post-menopausal       Family History   Problem Relation Age of Onset    Hypertension Father     Skin cancer Father     Heart disease Father     Heart disease Sister     No Known Problems Mother     Pancreatic cancer Brother        Review of Systems   Constitutional: Negative. Negative for fever and malaise/fatigue.   HENT: Negative.  Negative for nosebleeds and sore throat.    Eyes: Negative.  Negative for blurred vision and double vision.   Cardiovascular: Negative.  Negative for chest pain, claudication, palpitations and syncope.   Respiratory: Negative.  Negative for cough, shortness of breath and snoring.    Endocrine: Negative.  Negative for cold intolerance, heat intolerance and polydipsia.   Hematologic/Lymphatic: Negative.    Skin: Negative.  Negative for itching, poor wound healing and rash.   Musculoskeletal: Negative.  Negative for joint pain, joint swelling, muscle weakness and myalgias.   Gastrointestinal: Negative.  Negative for abdominal pain,  "melena, nausea and vomiting.   Genitourinary: Negative.    Neurological: Negative.  Negative for light-headedness, loss of balance, seizures, vertigo and weakness.   Psychiatric/Behavioral: Negative.  Negative for altered mental status and depression.    Allergic/Immunologic: Negative.        No Known Allergies      Current Outpatient Medications:     aspirin (aspirin) 81 MG EC tablet, Take 1 tablet by mouth Daily., Disp: , Rfl:     atenolol (TENORMIN) 50 MG tablet, Take 1.5 tablets by mouth Daily., Disp: 135 tablet, Rfl: 3    atorvastatin (LIPITOR) 80 MG tablet, Take 1 tablet by mouth Daily., Disp: 90 tablet, Rfl: 3    cyclobenzaprine (FLEXERIL) 10 MG tablet, Take 1 tablet by mouth 3 (Three) Times a Day As Needed for Muscle Spasms., Disp: 15 tablet, Rfl: 0    Diclofenac Sodium 4 %, Topiramate 2 %, cloNIDine HCl 0.2 %, Lidocaine HCl 5 %, Apply 1-2 g topically to the appropriate area as directed 3 (Three) to 4 (Four) times daily., Disp: 90 g, Rfl: 1    ibuprofen (ADVIL,MOTRIN) 200 MG tablet, Take 1 tablet by mouth As Needed for Mild Pain., Disp: , Rfl:     isosorbide mononitrate (IMDUR) 30 MG 24 hr tablet, Take 1 tablet by mouth Daily., Disp: 90 tablet, Rfl: 3    ketoconazole (NIZORAL) 2 % cream, Apply 1 Application topically to the appropriate area as directed Daily., Disp: , Rfl:     lisinopril (PRINIVIL,ZESTRIL) 20 MG tablet, Take 1 tablet by mouth Daily., Disp: 90 tablet, Rfl: 3    nitroglycerin (NITROSTAT) 0.4 MG SL tablet, Place 1 tablet under the tongue Every 5 (Five) Minutes As Needed for Chest Pain (Only if SBP Greater Than 100). Take no more than 3 doses in 15 minutes., Disp: 30 tablet, Rfl: 6      Objective:     Vitals:    04/07/25 1139   BP: 154/84   Pulse: 61   Weight: 107 kg (235 lb)   Height: 180.3 cm (71\")     Body mass index is 32.78 kg/m².    PHYSICAL EXAM:    Constitutional:       Appearance: Healthy appearance. Not in distress.   Neck:      Vascular: No JVR. JVD normal.   Pulmonary:      Effort: " Pulmonary effort is normal.      Breath sounds: Normal breath sounds. No wheezing. No rhonchi. No rales.   Chest:      Chest wall: Not tender to palpatation.   Cardiovascular:      PMI at left midclavicular line. Normal rate. Regular rhythm. Normal S1. Normal S2.       Murmurs: There is no murmur.      No gallop.  No click. No rub.   Pulses:     Intact distal pulses.   Edema:     Peripheral edema absent.   Abdominal:      General: Bowel sounds are normal.      Palpations: Abdomen is soft.      Tenderness: There is no abdominal tenderness.   Musculoskeletal: Normal range of motion.         General: No tenderness. Skin:     General: Skin is warm and dry.   Neurological:      General: No focal deficit present.      Mental Status: Alert and oriented to person, place and time.           ECG 12 Lead    Date/Time: 4/7/2025 11:59 AM  Performed by: Han Nicholas MD    Authorized by: Han Nicholas MD  Comparison: compared with previous ECG from 8/21/2024  Similar to previous ECG  Rhythm: sinus rhythm  Rate: normal  QRS axis: normal          8/27/24   No ECG evidence of myocardial ischemia.    Negative clinical evidence of myocardial ischemia.    Left Heart Cath 08/03/2021:  1. Left main: Normal  2. LAD: Chronic total occlusion mid segment.  Discrete 50 to 60% proximal diagonal stenosis.  LAD fills via right to left collaterals.  3. LCX: Ectatic proximal vessel with a calcified discrete 90% proximal stenosis.  Discrete 50% mid to distal stenosis.  4. RCA: Calcified vessel.  Diffuse 10 to 20% mid vessel stenosis  5.  Normal left ventricular size and systolic function with mild anterolateral wall hypokinesis distally.  5.  Successful PCI of the proximal to mid circumflex with a 3.5 x 23 mm Xience Vanna drug-eluting stent, postdilated to high pressure with a 3.75 mm NC trek balloon.      Assessment:        Plan:       Coronary artery disease: History of PCI/MISSY in 2021.  of mid LAD with right to left  collaterals being treated medically.     -Continue aspirin 81 mg p.o. daily.   -No change in atenolol.  Lisinopril will be increased.  Tolerating this well.    Hypertension: Not well-controlled.  I would recommend doubling the lisinopril therapy to 40 mg p.o. daily.    Hyperlipidemia: Currently on atorvastatin 80 mg p.o. nightly.  Labs have been reviewed from November 2024 and last LDL was mildly elevated from prior at 85.  Dietary modification has been recommended.  He has been doing a very poor job with that as of late.         Your medication list            Accurate as of April 7, 2025 11:59 AM. If you have any questions, ask your nurse or doctor.                CONTINUE taking these medications        Instructions Last Dose Given Next Dose Due   aspirin 81 MG EC tablet      Take 1 tablet by mouth Daily.       atenolol 50 MG tablet  Commonly known as: TENORMIN      Take 1.5 tablets by mouth Daily.       atorvastatin 80 MG tablet  Commonly known as: LIPITOR      Take 1 tablet by mouth Daily.       cyclobenzaprine 10 MG tablet  Commonly known as: FLEXERIL      Take 1 tablet by mouth 3 (Three) Times a Day As Needed for Muscle Spasms.       Diclofenac Sodium 4 %, Topiramate 2 %, cloNIDine HCl 0.2 %, Lidocaine HCl 5 %      Apply 1-2 g topically to the appropriate area as directed 3 (Three) to 4 (Four) times daily.       ibuprofen 200 MG tablet  Commonly known as: ADVIL,MOTRIN      Take 1 tablet by mouth As Needed for Mild Pain.       isosorbide mononitrate 30 MG 24 hr tablet  Commonly known as: IMDUR      Take 1 tablet by mouth Daily.       ketoconazole 2 % cream  Commonly known as: NIZORAL      Apply 1 Application topically to the appropriate area as directed Daily.       lisinopril 20 MG tablet  Commonly known as: PRINIVIL,ZESTRIL      Take 1 tablet by mouth Daily.       nitroglycerin 0.4 MG SL tablet  Commonly known as: NITROSTAT      Place 1 tablet under the tongue Every 5 (Five) Minutes As Needed for Chest Pain  (Only if SBP Greater Than 100). Take no more than 3 doses in 15 minutes.

## 2025-04-24 RX ORDER — CHLORTHALIDONE 25 MG/1
25 TABLET ORAL DAILY
Qty: 90 TABLET | Refills: 3 | Status: SHIPPED | OUTPATIENT
Start: 2025-04-24

## 2025-08-15 ENCOUNTER — APPOINTMENT (OUTPATIENT)
Dept: CT IMAGING | Facility: HOSPITAL | Age: 70
End: 2025-08-15
Payer: MEDICARE

## 2025-08-15 ENCOUNTER — APPOINTMENT (OUTPATIENT)
Dept: CARDIOLOGY | Facility: HOSPITAL | Age: 70
End: 2025-08-15
Payer: MEDICARE

## 2025-08-15 ENCOUNTER — HOSPITAL ENCOUNTER (OUTPATIENT)
Facility: HOSPITAL | Age: 70
Setting detail: OBSERVATION
Discharge: HOME | End: 2025-08-16
Attending: EMERGENCY MEDICINE | Admitting: EMERGENCY MEDICINE
Payer: MEDICARE

## 2025-08-15 ENCOUNTER — APPOINTMENT (OUTPATIENT)
Dept: GENERAL RADIOLOGY | Facility: HOSPITAL | Age: 70
End: 2025-08-15
Payer: MEDICARE

## 2025-08-15 DIAGNOSIS — R07.89 CHEST PRESSURE: ICD-10-CM

## 2025-08-15 DIAGNOSIS — R55 NEAR SYNCOPE: Primary | ICD-10-CM

## 2025-08-15 DIAGNOSIS — N17.9 AKI (ACUTE KIDNEY INJURY): ICD-10-CM

## 2025-08-15 DIAGNOSIS — I95.1 ORTHOSTATIC HYPOTENSION: ICD-10-CM

## 2025-08-15 DIAGNOSIS — I48.0 PAROXYSMAL ATRIAL FIBRILLATION: ICD-10-CM

## 2025-08-15 LAB
ALBUMIN SERPL-MCNC: 3.9 G/DL (ref 3.5–5.2)
ALBUMIN/GLOB SERPL: 1.2 G/DL
ALP SERPL-CCNC: 67 U/L (ref 39–117)
ALT SERPL W P-5'-P-CCNC: 20 U/L (ref 1–41)
ANION GAP SERPL CALCULATED.3IONS-SCNC: 9.4 MMOL/L (ref 5–15)
AORTIC DIMENSIONLESS INDEX: 0.81 (DI)
ASCENDING AORTA: 2.5 CM
AST SERPL-CCNC: 25 U/L (ref 1–40)
AV MEAN PRESS GRAD SYS DOP V1V2: 2.28 MMHG
AV VMAX SYS DOP: 111.3 CM/SEC
BACTERIA UR QL AUTO: NORMAL /HPF
BASOPHILS # BLD AUTO: 0.04 10*3/MM3 (ref 0–0.2)
BASOPHILS NFR BLD AUTO: 0.7 % (ref 0–1.5)
BH CV ECHO MEAS - ACS: 1.52 CM
BH CV ECHO MEAS - AO MAX PG: 5 MMHG
BH CV ECHO MEAS - AO ROOT DIAM: 2.9 CM
BH CV ECHO MEAS - AO V2 VTI: 25.1 CM
BH CV ECHO MEAS - AVA(I,D): 2.9 CM2
BH CV ECHO MEAS - EDV(CUBED): 106.7 ML
BH CV ECHO MEAS - EDV(MOD-SP2): 147 ML
BH CV ECHO MEAS - EDV(MOD-SP4): 146 ML
BH CV ECHO MEAS - EF(MOD-SP2): 59.2 %
BH CV ECHO MEAS - EF(MOD-SP4): 59.6 %
BH CV ECHO MEAS - ESV(CUBED): 27.8 ML
BH CV ECHO MEAS - ESV(MOD-SP2): 60 ML
BH CV ECHO MEAS - ESV(MOD-SP4): 59 ML
BH CV ECHO MEAS - FS: 36.1 %
BH CV ECHO MEAS - IVS/LVPW: 1.05 CM
BH CV ECHO MEAS - IVSD: 0.76 CM
BH CV ECHO MEAS - LAT PEAK E' VEL: 7.5 CM/SEC
BH CV ECHO MEAS - LV DIASTOLIC VOL/BSA (35-75): 65.9 CM2
BH CV ECHO MEAS - LV MASS(C)D: 112.4 GRAMS
BH CV ECHO MEAS - LV MAX PG: 2.6 MMHG
BH CV ECHO MEAS - LV MEAN PG: 1.65 MMHG
BH CV ECHO MEAS - LV SYSTOLIC VOL/BSA (12-30): 26.6 CM2
BH CV ECHO MEAS - LV V1 MAX: 80.5 CM/SEC
BH CV ECHO MEAS - LV V1 VTI: 20.3 CM
BH CV ECHO MEAS - LVIDD: 4.7 CM
BH CV ECHO MEAS - LVIDS: 3 CM
BH CV ECHO MEAS - LVOT AREA: 3.5 CM2
BH CV ECHO MEAS - LVOT DIAM: 2.12 CM
BH CV ECHO MEAS - LVPWD: 0.72 CM
BH CV ECHO MEAS - MED PEAK E' VEL: 7.5 CM/SEC
BH CV ECHO MEAS - MV A DUR: 0.12 SEC
BH CV ECHO MEAS - MV A MAX VEL: 31.3 CM/SEC
BH CV ECHO MEAS - MV DEC SLOPE: 689.6 CM/SEC2
BH CV ECHO MEAS - MV DEC TIME: 0.12 SEC
BH CV ECHO MEAS - MV E MAX VEL: 74.6 CM/SEC
BH CV ECHO MEAS - MV E/A: 2.39
BH CV ECHO MEAS - MV MAX PG: 3.1 MMHG
BH CV ECHO MEAS - MV MEAN PG: 0.79 MMHG
BH CV ECHO MEAS - MV P1/2T: 36.7 MSEC
BH CV ECHO MEAS - MV V2 VTI: 29.9 CM
BH CV ECHO MEAS - MVA(P1/2T): 6 CM2
BH CV ECHO MEAS - MVA(VTI): 2.41 CM2
BH CV ECHO MEAS - PA ACC TIME: 0.06 SEC
BH CV ECHO MEAS - PA V2 MAX: 107.7 CM/SEC
BH CV ECHO MEAS - PULM A REVS DUR: 0.1 SEC
BH CV ECHO MEAS - PULM A REVS VEL: 17.1 CM/SEC
BH CV ECHO MEAS - PULM DIAS VEL: 42 CM/SEC
BH CV ECHO MEAS - PULM S/D: 1.21
BH CV ECHO MEAS - PULM SYS VEL: 51 CM/SEC
BH CV ECHO MEAS - QP/QS: 0.8
BH CV ECHO MEAS - RV MAX PG: 1.28 MMHG
BH CV ECHO MEAS - RV V1 MAX: 56.6 CM/SEC
BH CV ECHO MEAS - RV V1 VTI: 14.8 CM
BH CV ECHO MEAS - RVOT DIAM: 2.23 CM
BH CV ECHO MEAS - SUP REN AO DIAM: 2.4 CM
BH CV ECHO MEAS - SV(LVOT): 71.9 ML
BH CV ECHO MEAS - SV(MOD-SP2): 87 ML
BH CV ECHO MEAS - SV(MOD-SP4): 87 ML
BH CV ECHO MEAS - SV(RVOT): 57.4 ML
BH CV ECHO MEAS - SVI(LVOT): 32.4 ML/M2
BH CV ECHO MEAS - SVI(MOD-SP2): 39.2 ML/M2
BH CV ECHO MEAS - SVI(MOD-SP4): 39.2 ML/M2
BH CV ECHO MEAS - TAPSE (>1.6): 2.8 CM
BH CV ECHO MEASUREMENTS AVERAGE E/E' RATIO: 9.95
BH CV XLRA - RV BASE: 3.9 CM
BH CV XLRA - RV LENGTH: 7.7 CM
BH CV XLRA - RV MID: 3.5 CM
BH CV XLRA - TDI S': 14.8 CM/SEC
BILIRUB SERPL-MCNC: 0.7 MG/DL (ref 0–1.2)
BILIRUB UR QL STRIP: NEGATIVE
BUN SERPL-MCNC: 30 MG/DL (ref 8–23)
BUN/CREAT SERPL: 21.7 (ref 7–25)
CALCIUM SPEC-SCNC: 9.1 MG/DL (ref 8.6–10.5)
CHLORIDE SERPL-SCNC: 104 MMOL/L (ref 98–107)
CK SERPL-CCNC: 168 U/L (ref 20–200)
CLARITY UR: CLEAR
CO2 SERPL-SCNC: 24.6 MMOL/L (ref 22–29)
COLOR UR: YELLOW
CREAT SERPL-MCNC: 1.38 MG/DL (ref 0.76–1.27)
DEPRECATED RDW RBC AUTO: 42.8 FL (ref 37–54)
EGFRCR SERPLBLD CKD-EPI 2021: 55.7 ML/MIN/1.73
EOSINOPHIL # BLD AUTO: 0.17 10*3/MM3 (ref 0–0.4)
EOSINOPHIL NFR BLD AUTO: 2.8 % (ref 0.3–6.2)
ERYTHROCYTE [DISTWIDTH] IN BLOOD BY AUTOMATED COUNT: 13.3 % (ref 12.3–15.4)
GEN 5 1HR TROPONIN T REFLEX: 9 NG/L
GLOBULIN UR ELPH-MCNC: 3.2 GM/DL
GLUCOSE SERPL-MCNC: 104 MG/DL (ref 65–99)
GLUCOSE UR STRIP-MCNC: NEGATIVE MG/DL
HCT VFR BLD AUTO: 43.6 % (ref 37.5–51)
HGB BLD-MCNC: 14.6 G/DL (ref 13–17.7)
HGB UR QL STRIP.AUTO: NEGATIVE
HOLD SPECIMEN: NORMAL
HOLD SPECIMEN: NORMAL
HYALINE CASTS UR QL AUTO: NORMAL /LPF
IMM GRANULOCYTES # BLD AUTO: 0.03 10*3/MM3 (ref 0–0.05)
IMM GRANULOCYTES NFR BLD AUTO: 0.5 % (ref 0–0.5)
KETONES UR QL STRIP: ABNORMAL
LEFT ATRIUM VOLUME INDEX: 26.4 ML/M2
LEUKOCYTE ESTERASE UR QL STRIP.AUTO: ABNORMAL
LV EF BIPLANE MOD: 60.1 %
LYMPHOCYTES # BLD AUTO: 2.26 10*3/MM3 (ref 0.7–3.1)
LYMPHOCYTES NFR BLD AUTO: 37.5 % (ref 19.6–45.3)
MAGNESIUM SERPL-MCNC: 2 MG/DL (ref 1.6–2.4)
MCH RBC QN AUTO: 29.5 PG (ref 26.6–33)
MCHC RBC AUTO-ENTMCNC: 33.5 G/DL (ref 31.5–35.7)
MCV RBC AUTO: 88.1 FL (ref 79–97)
MONOCYTES # BLD AUTO: 0.71 10*3/MM3 (ref 0.1–0.9)
MONOCYTES NFR BLD AUTO: 11.8 % (ref 5–12)
NEUTROPHILS NFR BLD AUTO: 2.81 10*3/MM3 (ref 1.7–7)
NEUTROPHILS NFR BLD AUTO: 46.7 % (ref 42.7–76)
NITRITE UR QL STRIP: NEGATIVE
NRBC BLD AUTO-RTO: 0 /100 WBC (ref 0–0.2)
NT-PROBNP SERPL-MCNC: 339 PG/ML (ref 0–900)
PH UR STRIP.AUTO: 6 [PH] (ref 5–8)
PHOSPHATE SERPL-MCNC: 3 MG/DL (ref 2.5–4.5)
PLATELET # BLD AUTO: 223 10*3/MM3 (ref 140–450)
PMV BLD AUTO: 9.6 FL (ref 6–12)
POTASSIUM SERPL-SCNC: 4.4 MMOL/L (ref 3.5–5.2)
PROT SERPL-MCNC: 7.1 G/DL (ref 6–8.5)
PROT UR QL STRIP: NEGATIVE
QT INTERVAL: 394 MS
QTC INTERVAL: 371 MS
RBC # BLD AUTO: 4.95 10*6/MM3 (ref 4.14–5.8)
RBC # UR STRIP: NORMAL /HPF
REF LAB TEST METHOD: NORMAL
SINUS: 3 CM
SODIUM SERPL-SCNC: 138 MMOL/L (ref 136–145)
SP GR UR STRIP: 1.02 (ref 1–1.03)
SQUAMOUS #/AREA URNS HPF: NORMAL /HPF
STJ: 2.31 CM
TROPONIN T NUMERIC DELTA: -3 NG/L
TROPONIN T SERPL HS-MCNC: 12 NG/L
TSH SERPL DL<=0.05 MIU/L-ACNC: 3.28 UIU/ML (ref 0.27–4.2)
UROBILINOGEN UR QL STRIP: ABNORMAL
WBC # UR STRIP: NORMAL /HPF
WBC NRBC COR # BLD AUTO: 6.02 10*3/MM3 (ref 3.4–10.8)
WHOLE BLOOD HOLD COAG: NORMAL
WHOLE BLOOD HOLD SPECIMEN: NORMAL

## 2025-08-15 PROCEDURE — 93005 ELECTROCARDIOGRAM TRACING: CPT

## 2025-08-15 PROCEDURE — 93010 ELECTROCARDIOGRAM REPORT: CPT | Performed by: INTERNAL MEDICINE

## 2025-08-15 PROCEDURE — 71045 X-RAY EXAM CHEST 1 VIEW: CPT

## 2025-08-15 PROCEDURE — 70450 CT HEAD/BRAIN W/O DYE: CPT

## 2025-08-15 PROCEDURE — 96360 HYDRATION IV INFUSION INIT: CPT

## 2025-08-15 PROCEDURE — 93306 TTE W/DOPPLER COMPLETE: CPT

## 2025-08-15 PROCEDURE — 84484 ASSAY OF TROPONIN QUANT: CPT | Performed by: EMERGENCY MEDICINE

## 2025-08-15 PROCEDURE — G0378 HOSPITAL OBSERVATION PER HR: HCPCS

## 2025-08-15 PROCEDURE — 93356 MYOCRD STRAIN IMG SPCKL TRCK: CPT

## 2025-08-15 PROCEDURE — 80053 COMPREHEN METABOLIC PANEL: CPT | Performed by: EMERGENCY MEDICINE

## 2025-08-15 PROCEDURE — 93005 ELECTROCARDIOGRAM TRACING: CPT | Performed by: EMERGENCY MEDICINE

## 2025-08-15 PROCEDURE — 99285 EMERGENCY DEPT VISIT HI MDM: CPT

## 2025-08-15 PROCEDURE — 93306 TTE W/DOPPLER COMPLETE: CPT | Performed by: INTERNAL MEDICINE

## 2025-08-15 PROCEDURE — 81001 URINALYSIS AUTO W/SCOPE: CPT | Performed by: EMERGENCY MEDICINE

## 2025-08-15 PROCEDURE — 82550 ASSAY OF CK (CPK): CPT | Performed by: EMERGENCY MEDICINE

## 2025-08-15 PROCEDURE — 84100 ASSAY OF PHOSPHORUS: CPT | Performed by: EMERGENCY MEDICINE

## 2025-08-15 PROCEDURE — 36415 COLL VENOUS BLD VENIPUNCTURE: CPT

## 2025-08-15 PROCEDURE — 99214 OFFICE O/P EST MOD 30 MIN: CPT | Performed by: INTERNAL MEDICINE

## 2025-08-15 PROCEDURE — 96361 HYDRATE IV INFUSION ADD-ON: CPT

## 2025-08-15 PROCEDURE — 25810000003 SODIUM CHLORIDE 0.9 % SOLUTION: Performed by: EMERGENCY MEDICINE

## 2025-08-15 PROCEDURE — 85025 COMPLETE CBC W/AUTO DIFF WBC: CPT

## 2025-08-15 PROCEDURE — 93356 MYOCRD STRAIN IMG SPCKL TRCK: CPT | Performed by: INTERNAL MEDICINE

## 2025-08-15 PROCEDURE — 25810000003 SODIUM CHLORIDE 0.9 % SOLUTION: Performed by: PHYSICIAN ASSISTANT

## 2025-08-15 PROCEDURE — 84443 ASSAY THYROID STIM HORMONE: CPT | Performed by: EMERGENCY MEDICINE

## 2025-08-15 PROCEDURE — 83735 ASSAY OF MAGNESIUM: CPT | Performed by: EMERGENCY MEDICINE

## 2025-08-15 PROCEDURE — 83880 ASSAY OF NATRIURETIC PEPTIDE: CPT | Performed by: EMERGENCY MEDICINE

## 2025-08-15 PROCEDURE — 25510000001 PERFLUTREN 6.52 MG/ML SUSPENSION 2 ML VIAL: Performed by: PHYSICIAN ASSISTANT

## 2025-08-15 RX ORDER — AMOXICILLIN 250 MG
2 CAPSULE ORAL 2 TIMES DAILY PRN
Status: DISCONTINUED | OUTPATIENT
Start: 2025-08-15 | End: 2025-08-16 | Stop reason: HOSPADM

## 2025-08-15 RX ORDER — POLYETHYLENE GLYCOL 3350 17 G/17G
17 POWDER, FOR SOLUTION ORAL DAILY PRN
Status: DISCONTINUED | OUTPATIENT
Start: 2025-08-15 | End: 2025-08-16 | Stop reason: HOSPADM

## 2025-08-15 RX ORDER — SODIUM CHLORIDE 0.9 % (FLUSH) 0.9 %
10 SYRINGE (ML) INJECTION AS NEEDED
Status: DISCONTINUED | OUTPATIENT
Start: 2025-08-15 | End: 2025-08-16 | Stop reason: HOSPADM

## 2025-08-15 RX ORDER — SODIUM CHLORIDE 0.9 % (FLUSH) 0.9 %
10 SYRINGE (ML) INJECTION EVERY 12 HOURS SCHEDULED
Status: DISCONTINUED | OUTPATIENT
Start: 2025-08-15 | End: 2025-08-16 | Stop reason: HOSPADM

## 2025-08-15 RX ORDER — ATENOLOL 50 MG/1
75 TABLET ORAL NIGHTLY
Status: DISCONTINUED | OUTPATIENT
Start: 2025-08-15 | End: 2025-08-16

## 2025-08-15 RX ORDER — ISOSORBIDE MONONITRATE 30 MG/1
30 TABLET, EXTENDED RELEASE ORAL NIGHTLY
Status: DISCONTINUED | OUTPATIENT
Start: 2025-08-15 | End: 2025-08-16 | Stop reason: HOSPADM

## 2025-08-15 RX ORDER — BISACODYL 10 MG
10 SUPPOSITORY, RECTAL RECTAL DAILY PRN
Status: DISCONTINUED | OUTPATIENT
Start: 2025-08-15 | End: 2025-08-16 | Stop reason: HOSPADM

## 2025-08-15 RX ORDER — NITROGLYCERIN 0.4 MG/1
0.4 TABLET SUBLINGUAL
Status: DISCONTINUED | OUTPATIENT
Start: 2025-08-15 | End: 2025-08-15

## 2025-08-15 RX ORDER — BISACODYL 5 MG/1
5 TABLET, DELAYED RELEASE ORAL DAILY PRN
Status: DISCONTINUED | OUTPATIENT
Start: 2025-08-15 | End: 2025-08-16 | Stop reason: HOSPADM

## 2025-08-15 RX ORDER — SODIUM CHLORIDE 9 MG/ML
100 INJECTION, SOLUTION INTRAVENOUS CONTINUOUS
Status: ACTIVE | OUTPATIENT
Start: 2025-08-15 | End: 2025-08-15

## 2025-08-15 RX ORDER — ATORVASTATIN CALCIUM 80 MG/1
80 TABLET, FILM COATED ORAL NIGHTLY
Status: DISCONTINUED | OUTPATIENT
Start: 2025-08-15 | End: 2025-08-16 | Stop reason: HOSPADM

## 2025-08-15 RX ORDER — ASPIRIN 81 MG/1
81 TABLET ORAL DAILY
Status: DISCONTINUED | OUTPATIENT
Start: 2025-08-15 | End: 2025-08-16 | Stop reason: HOSPADM

## 2025-08-15 RX ORDER — SODIUM CHLORIDE 9 MG/ML
40 INJECTION, SOLUTION INTRAVENOUS AS NEEDED
Status: DISCONTINUED | OUTPATIENT
Start: 2025-08-15 | End: 2025-08-16 | Stop reason: HOSPADM

## 2025-08-15 RX ORDER — BENZOCAINE/MENTHOL 6 MG-10 MG
1 LOZENGE MUCOUS MEMBRANE 2 TIMES DAILY PRN
Status: DISCONTINUED | OUTPATIENT
Start: 2025-08-15 | End: 2025-08-16 | Stop reason: HOSPADM

## 2025-08-15 RX ORDER — NITROGLYCERIN 0.4 MG/1
0.4 TABLET SUBLINGUAL
Status: DISCONTINUED | OUTPATIENT
Start: 2025-08-15 | End: 2025-08-16 | Stop reason: HOSPADM

## 2025-08-15 RX ORDER — LISINOPRIL 20 MG/1
40 TABLET ORAL NIGHTLY
Status: DISCONTINUED | OUTPATIENT
Start: 2025-08-15 | End: 2025-08-16 | Stop reason: HOSPADM

## 2025-08-15 RX ADMIN — Medication 10 ML: at 21:23

## 2025-08-15 RX ADMIN — SODIUM CHLORIDE 100 ML/HR: 9 INJECTION, SOLUTION INTRAVENOUS at 12:45

## 2025-08-15 RX ADMIN — ATORVASTATIN CALCIUM 80 MG: 80 TABLET, FILM COATED ORAL at 21:22

## 2025-08-15 RX ADMIN — PERFLUTREN 2 ML: 6.52 INJECTION, SUSPENSION INTRAVENOUS at 15:04

## 2025-08-15 RX ADMIN — ASPIRIN 81 MG: 81 TABLET, COATED ORAL at 17:20

## 2025-08-15 RX ADMIN — ISOSORBIDE MONONITRATE 30 MG: 30 TABLET, EXTENDED RELEASE ORAL at 21:23

## 2025-08-15 RX ADMIN — SODIUM CHLORIDE 1000 ML: 9 INJECTION, SOLUTION INTRAVENOUS at 08:59

## 2025-08-15 RX ADMIN — SODIUM CHLORIDE 500 ML: 9 INJECTION, SOLUTION INTRAVENOUS at 17:26

## 2025-08-15 RX ADMIN — HYDROCORTISONE 1 APPLICATION: 1 CREAM TOPICAL at 21:22

## 2025-08-16 ENCOUNTER — APPOINTMENT (OUTPATIENT)
Dept: CARDIOLOGY | Facility: HOSPITAL | Age: 70
End: 2025-08-16
Payer: MEDICARE

## 2025-08-16 VITALS
TEMPERATURE: 97.5 F | RESPIRATION RATE: 18 BRPM | HEIGHT: 71 IN | DIASTOLIC BLOOD PRESSURE: 79 MMHG | HEART RATE: 64 BPM | OXYGEN SATURATION: 96 % | BODY MASS INDEX: 31.5 KG/M2 | SYSTOLIC BLOOD PRESSURE: 132 MMHG | WEIGHT: 225 LBS

## 2025-08-16 LAB
ALBUMIN SERPL-MCNC: 3.5 G/DL (ref 3.5–5.2)
ALBUMIN/GLOB SERPL: 1.2 G/DL
ALP SERPL-CCNC: 58 U/L (ref 39–117)
ALT SERPL W P-5'-P-CCNC: 18 U/L (ref 1–41)
ANION GAP SERPL CALCULATED.3IONS-SCNC: 10.5 MMOL/L (ref 5–15)
AST SERPL-CCNC: 18 U/L (ref 1–40)
BILIRUB SERPL-MCNC: 0.6 MG/DL (ref 0–1.2)
BUN SERPL-MCNC: 26 MG/DL (ref 8–23)
BUN/CREAT SERPL: 29.5 (ref 7–25)
CALCIUM SPEC-SCNC: 8.8 MG/DL (ref 8.6–10.5)
CHLORIDE SERPL-SCNC: 104 MMOL/L (ref 98–107)
CO2 SERPL-SCNC: 22.5 MMOL/L (ref 22–29)
CREAT SERPL-MCNC: 0.88 MG/DL (ref 0.76–1.27)
DEPRECATED RDW RBC AUTO: 42.8 FL (ref 37–54)
EGFRCR SERPLBLD CKD-EPI 2021: 93.7 ML/MIN/1.73
ERYTHROCYTE [DISTWIDTH] IN BLOOD BY AUTOMATED COUNT: 13.2 % (ref 12.3–15.4)
GLOBULIN UR ELPH-MCNC: 3 GM/DL
GLUCOSE SERPL-MCNC: 97 MG/DL (ref 65–99)
HCT VFR BLD AUTO: 39.2 % (ref 37.5–51)
HGB BLD-MCNC: 13.2 G/DL (ref 13–17.7)
MCH RBC QN AUTO: 29.7 PG (ref 26.6–33)
MCHC RBC AUTO-ENTMCNC: 33.7 G/DL (ref 31.5–35.7)
MCV RBC AUTO: 88.3 FL (ref 79–97)
PLATELET # BLD AUTO: 206 10*3/MM3 (ref 140–450)
PMV BLD AUTO: 9.6 FL (ref 6–12)
POTASSIUM SERPL-SCNC: 4.3 MMOL/L (ref 3.5–5.2)
PROT SERPL-MCNC: 6.5 G/DL (ref 6–8.5)
QT INTERVAL: 370 MS
QTC INTERVAL: 394 MS
RBC # BLD AUTO: 4.44 10*6/MM3 (ref 4.14–5.8)
SODIUM SERPL-SCNC: 137 MMOL/L (ref 136–145)
TROPONIN T SERPL HS-MCNC: 11 NG/L
WBC NRBC COR # BLD AUTO: 6.17 10*3/MM3 (ref 3.4–10.8)

## 2025-08-16 PROCEDURE — 84484 ASSAY OF TROPONIN QUANT: CPT | Performed by: PHYSICIAN ASSISTANT

## 2025-08-16 PROCEDURE — 25810000003 SODIUM CHLORIDE 0.9 % SOLUTION: Performed by: PHYSICIAN ASSISTANT

## 2025-08-16 PROCEDURE — 93246 EXT ECG>7D<15D RECORDING: CPT

## 2025-08-16 PROCEDURE — 93005 ELECTROCARDIOGRAM TRACING: CPT | Performed by: PHYSICIAN ASSISTANT

## 2025-08-16 PROCEDURE — 85027 COMPLETE CBC AUTOMATED: CPT | Performed by: PHYSICIAN ASSISTANT

## 2025-08-16 PROCEDURE — 80053 COMPREHEN METABOLIC PANEL: CPT | Performed by: PHYSICIAN ASSISTANT

## 2025-08-16 PROCEDURE — 93010 ELECTROCARDIOGRAM REPORT: CPT | Performed by: INTERNAL MEDICINE

## 2025-08-16 PROCEDURE — 99214 OFFICE O/P EST MOD 30 MIN: CPT | Performed by: INTERNAL MEDICINE

## 2025-08-16 PROCEDURE — G0378 HOSPITAL OBSERVATION PER HR: HCPCS

## 2025-08-16 RX ORDER — ATENOLOL 50 MG/1
50 TABLET ORAL NIGHTLY
Qty: 30 TABLET | Refills: 0 | Status: SHIPPED | OUTPATIENT
Start: 2025-08-16 | End: 2025-09-15

## 2025-08-16 RX ORDER — ATENOLOL 50 MG/1
50 TABLET ORAL NIGHTLY
Status: DISCONTINUED | OUTPATIENT
Start: 2025-08-16 | End: 2025-08-16 | Stop reason: HOSPADM

## 2025-08-16 RX ORDER — SODIUM CHLORIDE 9 MG/ML
100 INJECTION, SOLUTION INTRAVENOUS CONTINUOUS
Status: DISCONTINUED | OUTPATIENT
Start: 2025-08-16 | End: 2025-08-16 | Stop reason: HOSPADM

## 2025-08-16 RX ADMIN — SODIUM CHLORIDE 100 ML/HR: 9 INJECTION, SOLUTION INTRAVENOUS at 02:56

## 2025-08-16 RX ADMIN — Medication 10 ML: at 02:57

## 2025-08-16 RX ADMIN — Medication 10 ML: at 08:35

## 2025-08-16 RX ADMIN — ASPIRIN 81 MG: 81 TABLET, COATED ORAL at 08:35

## 2025-08-18 ENCOUNTER — TRANSITIONAL CARE MANAGEMENT TELEPHONE ENCOUNTER (OUTPATIENT)
Dept: CALL CENTER | Facility: HOSPITAL | Age: 70
End: 2025-08-18
Payer: MEDICARE

## 2025-08-18 ENCOUNTER — READMISSION MANAGEMENT (OUTPATIENT)
Dept: CALL CENTER | Facility: HOSPITAL | Age: 70
End: 2025-08-18
Payer: MEDICARE

## 2025-08-25 RX ORDER — ATORVASTATIN CALCIUM 80 MG/1
80 TABLET, FILM COATED ORAL DAILY
Qty: 90 TABLET | Refills: 1 | Status: SHIPPED | OUTPATIENT
Start: 2025-08-25

## (undated) DEVICE — DEV INDEFLATOR P/N 580289

## (undated) DEVICE — GLIDESHEATH SLENDER STAINLESS STEEL KIT: Brand: GLIDESHEATH SLENDER

## (undated) DEVICE — TREK CORONARY DILATATION CATHETER 3.0 MM X 12 MM / RAPID-EXCHANGE: Brand: TREK

## (undated) DEVICE — GW EMR FIX EXCHG J STD .035 3MM 260CM

## (undated) DEVICE — 6F .070 XB 3.5 100CM: Brand: VISTA BRITE TIP

## (undated) DEVICE — CATH DIAG IMPULSE FR5 5F 100CM

## (undated) DEVICE — CATH DIAG IMPULSE PIG 5F 100CM

## (undated) DEVICE — NC TREK CORONARY DILATATION CATHETER 3.75 MM X 20 MM / RAPID-EXCHANGE: Brand: NC TREK

## (undated) DEVICE — RUNTHROUGH NS EXTRA FLOPPY PTCA GUIDEWIRE: Brand: RUNTHROUGH

## (undated) DEVICE — 6F .070 XB 3 100CM: Brand: VISTA BRITE TIP

## (undated) DEVICE — BND PRESS RADL COMFRT 14IN STRL

## (undated) DEVICE — KT MANIFLD CARDIAC

## (undated) DEVICE — CATH DIAG IMPULSE FL3.5 5F 100CM

## (undated) DEVICE — PK CATH CARD 40